# Patient Record
Sex: FEMALE | Race: WHITE | Employment: OTHER | URBAN - METROPOLITAN AREA
[De-identification: names, ages, dates, MRNs, and addresses within clinical notes are randomized per-mention and may not be internally consistent; named-entity substitution may affect disease eponyms.]

---

## 2017-01-04 ENCOUNTER — NURSE ONLY (OUTPATIENT)
Dept: CARDIOLOGY CLINIC | Age: 72
End: 2017-01-04

## 2017-01-04 DIAGNOSIS — Z45.09 ENCOUNTER FOR ELECTRONIC ANALYSIS OF REVEAL EVENT RECORDER: Primary | ICD-10-CM

## 2017-01-04 DIAGNOSIS — R55 SYNCOPE, UNSPECIFIED SYNCOPE TYPE: ICD-10-CM

## 2017-01-04 PROCEDURE — 93299 PR REM INTERROG ICPMS/SCRMS <30 D TECH REVIEW: CPT | Performed by: INTERNAL MEDICINE

## 2017-01-04 PROCEDURE — 93298 REM INTERROG DEV EVAL SCRMS: CPT | Performed by: INTERNAL MEDICINE

## 2017-01-23 ENCOUNTER — OFFICE VISIT (OUTPATIENT)
Dept: CARDIOLOGY CLINIC | Age: 72
End: 2017-01-23

## 2017-01-23 ENCOUNTER — PROCEDURE VISIT (OUTPATIENT)
Dept: CARDIOLOGY CLINIC | Age: 72
End: 2017-01-23

## 2017-01-23 VITALS
BODY MASS INDEX: 31.84 KG/M2 | OXYGEN SATURATION: 98 % | HEIGHT: 65 IN | WEIGHT: 191.12 LBS | DIASTOLIC BLOOD PRESSURE: 72 MMHG | HEART RATE: 100 BPM | SYSTOLIC BLOOD PRESSURE: 130 MMHG

## 2017-01-23 DIAGNOSIS — R55 SYNCOPE, UNSPECIFIED SYNCOPE TYPE: ICD-10-CM

## 2017-01-23 DIAGNOSIS — E78.5 HYPERLIPIDEMIA, UNSPECIFIED HYPERLIPIDEMIA TYPE: ICD-10-CM

## 2017-01-23 DIAGNOSIS — I10 ESSENTIAL HYPERTENSION: ICD-10-CM

## 2017-01-23 DIAGNOSIS — Z45.09 ENCOUNTER FOR ELECTRONIC ANALYSIS OF REVEAL EVENT RECORDER: ICD-10-CM

## 2017-01-23 DIAGNOSIS — I44.7 LEFT BUNDLE BRANCH BLOCK: Primary | ICD-10-CM

## 2017-01-23 PROCEDURE — 99214 OFFICE O/P EST MOD 30 MIN: CPT | Performed by: INTERNAL MEDICINE

## 2017-01-23 PROCEDURE — 93291 INTERROG DEV EVAL SCRMS IP: CPT | Performed by: INTERNAL MEDICINE

## 2017-02-03 PROCEDURE — 93298 REM INTERROG DEV EVAL SCRMS: CPT | Performed by: INTERNAL MEDICINE

## 2017-02-03 PROCEDURE — 93299 PR REM INTERROG ICPMS/SCRMS <30 D TECH REVIEW: CPT | Performed by: INTERNAL MEDICINE

## 2017-02-08 ENCOUNTER — NURSE ONLY (OUTPATIENT)
Dept: CARDIOLOGY CLINIC | Age: 72
End: 2017-02-08

## 2017-02-08 DIAGNOSIS — R55 SYNCOPE, UNSPECIFIED SYNCOPE TYPE: ICD-10-CM

## 2017-02-08 DIAGNOSIS — Z45.09 ENCOUNTER FOR ELECTRONIC ANALYSIS OF REVEAL EVENT RECORDER: Primary | ICD-10-CM

## 2017-04-05 ENCOUNTER — NURSE ONLY (OUTPATIENT)
Dept: CARDIOLOGY CLINIC | Age: 72
End: 2017-04-05

## 2017-04-05 DIAGNOSIS — Z45.09 ENCOUNTER FOR ELECTRONIC ANALYSIS OF REVEAL EVENT RECORDER: ICD-10-CM

## 2017-04-05 DIAGNOSIS — R55 SYNCOPE, UNSPECIFIED SYNCOPE TYPE: ICD-10-CM

## 2017-04-05 PROCEDURE — 93298 REM INTERROG DEV EVAL SCRMS: CPT | Performed by: INTERNAL MEDICINE

## 2017-04-05 PROCEDURE — 93299 PR REM INTERROG ICPMS/SCRMS <30 D TECH REVIEW: CPT | Performed by: INTERNAL MEDICINE

## 2017-04-06 ENCOUNTER — TELEPHONE (OUTPATIENT)
Dept: CARDIOLOGY CLINIC | Age: 72
End: 2017-04-06

## 2017-05-10 ENCOUNTER — OFFICE VISIT (OUTPATIENT)
Dept: ORTHOPEDIC SURGERY | Age: 72
End: 2017-05-10

## 2017-05-10 VITALS
HEART RATE: 72 BPM | BODY MASS INDEX: 31.65 KG/M2 | DIASTOLIC BLOOD PRESSURE: 72 MMHG | WEIGHT: 190 LBS | SYSTOLIC BLOOD PRESSURE: 139 MMHG | HEIGHT: 65 IN

## 2017-05-10 DIAGNOSIS — Z87.39 S/P DISCECTOMY FOR HERNIATED NUCLEUS PULPOSUS: ICD-10-CM

## 2017-05-10 DIAGNOSIS — M54.41 ACUTE BILATERAL LOW BACK PAIN WITH RIGHT-SIDED SCIATICA: ICD-10-CM

## 2017-05-10 DIAGNOSIS — Z98.890 S/P DISCECTOMY FOR HERNIATED NUCLEUS PULPOSUS: ICD-10-CM

## 2017-05-10 DIAGNOSIS — M54.16 LUMBAR RADICULOPATHY: Primary | ICD-10-CM

## 2017-05-10 PROCEDURE — 99214 OFFICE O/P EST MOD 30 MIN: CPT | Performed by: NEUROLOGICAL SURGERY

## 2017-05-19 ENCOUNTER — HOSPITAL ENCOUNTER (OUTPATIENT)
Dept: MAMMOGRAPHY | Age: 72
Discharge: HOME | End: 2017-05-19
Attending: SURGERY | Admitting: SURGERY

## 2017-05-19 DIAGNOSIS — Z12.31 VISIT FOR SCREENING MAMMOGRAM: ICD-10-CM

## 2017-05-26 ENCOUNTER — OFFICE VISIT (OUTPATIENT)
Dept: SURGERY | Age: 72
End: 2017-05-26

## 2017-05-26 VITALS
HEIGHT: 65 IN | BODY MASS INDEX: 31.49 KG/M2 | HEART RATE: 80 BPM | WEIGHT: 189 LBS | DIASTOLIC BLOOD PRESSURE: 64 MMHG | SYSTOLIC BLOOD PRESSURE: 130 MMHG

## 2017-05-26 DIAGNOSIS — Z08 ENCOUNTER FOR FOLLOW-UP SURVEILLANCE OF BREAST CANCER: ICD-10-CM

## 2017-05-26 DIAGNOSIS — Z85.3 ENCOUNTER FOR FOLLOW-UP SURVEILLANCE OF BREAST CANCER: ICD-10-CM

## 2017-05-26 DIAGNOSIS — Z90.10 HISTORY OF PARTIAL MASTECTOMY, UNSPECIFIED LATERALITY: ICD-10-CM

## 2017-05-26 DIAGNOSIS — Z12.31 VISIT FOR SCREENING MAMMOGRAM: ICD-10-CM

## 2017-05-26 DIAGNOSIS — Z85.3 PERSONAL HISTORY OF BREAST CANCER: Primary | Chronic | ICD-10-CM

## 2017-05-26 PROCEDURE — 99213 OFFICE O/P EST LOW 20 MIN: CPT | Performed by: SURGERY

## 2017-06-23 LAB
AVERAGE GLUCOSE: NORMAL
HBA1C MFR BLD: 6.7 %

## 2017-07-18 ENCOUNTER — PROCEDURE VISIT (OUTPATIENT)
Dept: CARDIOLOGY CLINIC | Age: 72
End: 2017-07-18

## 2017-07-18 ENCOUNTER — OFFICE VISIT (OUTPATIENT)
Dept: CARDIOLOGY CLINIC | Age: 72
End: 2017-07-18

## 2017-07-18 VITALS
DIASTOLIC BLOOD PRESSURE: 68 MMHG | SYSTOLIC BLOOD PRESSURE: 120 MMHG | WEIGHT: 189 LBS | HEIGHT: 65 IN | OXYGEN SATURATION: 98 % | HEART RATE: 86 BPM | BODY MASS INDEX: 31.49 KG/M2

## 2017-07-18 DIAGNOSIS — R55 SYNCOPE, UNSPECIFIED SYNCOPE TYPE: ICD-10-CM

## 2017-07-18 DIAGNOSIS — I44.7 LBBB (LEFT BUNDLE BRANCH BLOCK): Primary | ICD-10-CM

## 2017-07-18 DIAGNOSIS — Z45.09 ENCOUNTER FOR ELECTRONIC ANALYSIS OF REVEAL EVENT RECORDER: ICD-10-CM

## 2017-07-18 DIAGNOSIS — I10 ESSENTIAL HYPERTENSION: Chronic | ICD-10-CM

## 2017-07-18 DIAGNOSIS — E78.5 HYPERLIPIDEMIA, UNSPECIFIED HYPERLIPIDEMIA TYPE: Chronic | ICD-10-CM

## 2017-07-18 PROCEDURE — 99214 OFFICE O/P EST MOD 30 MIN: CPT | Performed by: INTERNAL MEDICINE

## 2017-07-18 PROCEDURE — 93291 INTERROG DEV EVAL SCRMS IP: CPT | Performed by: INTERNAL MEDICINE

## 2017-10-18 ENCOUNTER — NURSE ONLY (OUTPATIENT)
Dept: CARDIOLOGY CLINIC | Age: 72
End: 2017-10-18

## 2017-10-18 DIAGNOSIS — Z45.09 ENCOUNTER FOR ELECTRONIC ANALYSIS OF REVEAL EVENT RECORDER: ICD-10-CM

## 2017-10-18 DIAGNOSIS — R55 SYNCOPE, UNSPECIFIED SYNCOPE TYPE: ICD-10-CM

## 2017-10-18 PROCEDURE — 93299 PR REM INTERROG ICPMS/SCRMS <30 D TECH REVIEW: CPT | Performed by: INTERNAL MEDICINE

## 2017-10-18 PROCEDURE — 93298 REM INTERROG DEV EVAL SCRMS: CPT | Performed by: INTERNAL MEDICINE

## 2017-10-20 RX ORDER — SODIUM CHLORIDE 0.9 % (FLUSH) 0.9 %
10 SYRINGE (ML) INJECTION PRN
Status: CANCELLED | OUTPATIENT
Start: 2017-10-20

## 2017-10-20 RX ORDER — LIDOCAINE HYDROCHLORIDE 10 MG/ML
1 INJECTION, SOLUTION EPIDURAL; INFILTRATION; INTRACAUDAL; PERINEURAL
Status: CANCELLED | OUTPATIENT
Start: 2017-10-20 | End: 2017-10-20

## 2017-10-20 RX ORDER — SODIUM CHLORIDE 9 MG/ML
INJECTION, SOLUTION INTRAVENOUS CONTINUOUS
Status: CANCELLED | OUTPATIENT
Start: 2017-10-20

## 2017-10-20 RX ORDER — SODIUM CHLORIDE 0.9 % (FLUSH) 0.9 %
10 SYRINGE (ML) INJECTION EVERY 12 HOURS SCHEDULED
Status: CANCELLED | OUTPATIENT
Start: 2017-10-20

## 2017-10-20 RX ORDER — ONDANSETRON 2 MG/ML
4 INJECTION INTRAMUSCULAR; INTRAVENOUS PRN
Status: CANCELLED | OUTPATIENT
Start: 2017-10-20

## 2017-11-01 ENCOUNTER — HOSPITAL ENCOUNTER (OUTPATIENT)
Dept: ENDOSCOPY | Age: 72
Discharge: HOME | End: 2017-10-26
Attending: INTERNAL MEDICINE | Admitting: INTERNAL MEDICINE

## 2018-01-22 ENCOUNTER — PROCEDURE VISIT (OUTPATIENT)
Dept: CARDIOLOGY CLINIC | Age: 73
End: 2018-01-22

## 2018-01-22 ENCOUNTER — OFFICE VISIT (OUTPATIENT)
Dept: CARDIOLOGY CLINIC | Age: 73
End: 2018-01-22

## 2018-01-22 VITALS
HEART RATE: 74 BPM | OXYGEN SATURATION: 98 % | HEIGHT: 65 IN | DIASTOLIC BLOOD PRESSURE: 72 MMHG | WEIGHT: 188 LBS | BODY MASS INDEX: 31.32 KG/M2 | SYSTOLIC BLOOD PRESSURE: 126 MMHG

## 2018-01-22 DIAGNOSIS — R55 SYNCOPE, UNSPECIFIED SYNCOPE TYPE: ICD-10-CM

## 2018-01-22 DIAGNOSIS — I10 ESSENTIAL HYPERTENSION: Primary | Chronic | ICD-10-CM

## 2018-01-22 DIAGNOSIS — Z45.09 ENCOUNTER FOR ELECTRONIC ANALYSIS OF REVEAL EVENT RECORDER: ICD-10-CM

## 2018-01-22 DIAGNOSIS — I44.7 LBBB (LEFT BUNDLE BRANCH BLOCK): ICD-10-CM

## 2018-01-22 DIAGNOSIS — E78.2 MIXED HYPERLIPIDEMIA: Chronic | ICD-10-CM

## 2018-01-22 PROCEDURE — 99214 OFFICE O/P EST MOD 30 MIN: CPT | Performed by: INTERNAL MEDICINE

## 2018-01-22 PROCEDURE — 93291 INTERROG DEV EVAL SCRMS IP: CPT | Performed by: INTERNAL MEDICINE

## 2018-01-22 NOTE — PROGRESS NOTES
[facility]   Electrophysiology   Date: 1/22/2018     CC: Atrial fibrillation   HPI: I had the privilege of visiting Liz Higginbotham in the office; her PMH includes LBBB present on previous ECG, diabetes mellitus, hypertension, IBS, hyperlipidemia, breast cancer. In Nov. 2014, she presented to the ED with the complaints of syncopal episode. Patient stated that she was sitting at the kitchen table after eating dinner and felt \"swimming\" in her head. She was concerned her blood sugar was low so she walked over to the counter and checked her blood sugar which was 138. Patient still felt LH and leaned over the sink. She turned to go back to table, next thing she knew she woke up on the floor. She felt her body shaking, little brief nausea. Uncertain of how long she was unconscious. She was not incontinent. Denied chest pain, palpitation or shortness of breath. She called her son, who called 911. Initial /75, HR 87 bpm. Her head had some soreness to touch. Currently asymptomatic. Tele monitor showed sinus rhythm. She exercises at [employer] 3 times a week without difficulty. She had a previous syncopal episode 2011 and was evaluated by Dr. Ilia Godwin. At that time, patient stated that she was working out at [facility] when she felt shaky like she was hypoglycemic. She sat down, drank some apple juice, and got up to leave. She felt lightheaded and dizzy, sat down and had some more apple juice, and apparently lost consciousness. EMS was called, and they brought her to the emergency room. Blood sugar 180. She had mildly hypotensive when she came to ED and was in normal sinus rhythm. Cardiac cath showed normal coronaries. She was suppose to have event monitor but not done. She currently has a Reveal monitor in place. Interrogation today shows normal function, no arrhythmias. Interval history: Today, she denies syncope or near syncope since her Reveal loop recorder was implanted. She is [place].  Interrogation Office: [phone]

## 2018-01-29 ENCOUNTER — HOSPITAL ENCOUNTER (OUTPATIENT)
Dept: ENDOSCOPY | Age: 73
Discharge: HOME | End: 2018-01-29
Attending: INTERNAL MEDICINE | Admitting: INTERNAL MEDICINE

## 2018-01-29 LAB
GLUCOSE BLD-MCNC: 117 MG/DL (ref 70–99)
GLUCOSE BLD-MCNC: 140 MG/DL (ref 70–99)
PERFORMED ON: ABNORMAL
PERFORMED ON: ABNORMAL

## 2018-01-29 RX ORDER — SODIUM CHLORIDE 0.9 % (FLUSH) 0.9 %
10 SYRINGE (ML) INJECTION EVERY 12 HOURS SCHEDULED
Status: DISCONTINUED | OUTPATIENT
Start: 2018-01-29 | End: 2018-01-30 | Stop reason: HOSPADM

## 2018-01-29 RX ORDER — SODIUM CHLORIDE 9 MG/ML
INJECTION, SOLUTION INTRAVENOUS CONTINUOUS
Status: DISCONTINUED | OUTPATIENT
Start: 2018-01-29 | End: 2018-01-30 | Stop reason: HOSPADM

## 2018-01-29 RX ORDER — SODIUM CHLORIDE 0.9 % (FLUSH) 0.9 %
10 SYRINGE (ML) INJECTION PRN
Status: DISCONTINUED | OUTPATIENT
Start: 2018-01-29 | End: 2018-01-30 | Stop reason: HOSPADM

## 2018-01-29 ASSESSMENT — ENCOUNTER SYMPTOMS: SHORTNESS OF BREATH: 0

## 2018-01-29 NOTE — ANESTHESIA PRE-OP
(LOTRISONE) 1-0.05 % cream Apply topically 2 times daily. Please dispense 45 gm tube 1 Tube 2    melatonin 3 MG TABS tablet Take 3 mg by mouth daily      glucosamine-chondroitin 750-600 MG TABS tablet Take 2 tablets by mouth daily       Multiple Vitamins-Minerals (CENTRUM SILVER PO) Take by mouth daily      gabapentin (NEURONTIN) 100 MG capsule Take 100 mg by mouth nightly      loperamide (IMODIUM) 2 MG capsule Take 2 mg by mouth as needed for Diarrhea      lisinopril-hydrochlorothiazide (PRINZIDE;ZESTORETIC) 20-25 MG per tablet Take 2 tablets by mouth daily.  metformin (GLUCOPHAGE) 500 MG tablet Take 500 mg by mouth 3 times daily (with meals).  simvastatin (ZOCOR) 20 MG tablet Take 20 mg by mouth nightly.  insulin glargine (LANTUS) 100 UNIT/ML injection Inject 30 Units into the skin nightly       loratadine (CLARITIN) 10 MG tablet Take 10 mg by mouth daily. Current Facility-Administered Medications   Medication Dose Route Frequency Provider Last Rate Last Dose    0.9 % sodium chloride infusion   Intravenous Continuous Rosalinda Padgett MD        sodium chloride flush 0.9 % injection 10 mL  10 mL Intravenous 2 times per day Rosalinda Padgett MD        sodium chloride flush 0.9 % injection 10 mL  10 mL Intravenous PRN Rosalinda Padgett MD           Vital Signs  (Current) There were no vitals filed for this visit.   (for past 48 hrs)  No Data Recorded  (last three values)   BP Readings from Last 3 Encounters:   01/22/18 126/72   07/18/17 120/68   05/26/17 130/64       CBC  Lab Results   Component Value Date    WBC 6.9 04/15/2016    RBC 3.84 04/15/2016    RBC 4.00 07/28/2015    HGB 12.1 04/15/2016    HCT 37.0 04/15/2016    MCV 96.2 04/15/2016    RDW 13.9 04/15/2016     04/15/2016       CMP    Lab Results   Component Value Date     04/15/2016    K 3.9 04/15/2016     04/15/2016    CO2 29 04/15/2016    BUN 16 04/15/2016    CREATININE 0.6 04/15/2016    GFRAA >60 04/15/2016

## 2018-05-29 ENCOUNTER — HOSPITAL ENCOUNTER (OUTPATIENT)
Dept: MAMMOGRAPHY | Age: 73
Discharge: HOME | End: 2018-05-29
Attending: SURGERY | Admitting: SURGERY

## 2018-05-29 ENCOUNTER — OFFICE VISIT (OUTPATIENT)
Dept: SURGERY | Age: 73
End: 2018-05-29

## 2018-05-29 VITALS
WEIGHT: 190 LBS | DIASTOLIC BLOOD PRESSURE: 71 MMHG | SYSTOLIC BLOOD PRESSURE: 135 MMHG | OXYGEN SATURATION: 97 % | HEIGHT: 65 IN | BODY MASS INDEX: 31.65 KG/M2 | RESPIRATION RATE: 16 BRPM | HEART RATE: 79 BPM

## 2018-05-29 DIAGNOSIS — Z12.31 VISIT FOR SCREENING MAMMOGRAM: ICD-10-CM

## 2018-05-29 DIAGNOSIS — Z90.12 HISTORY OF PARTIAL MASTECTOMY, LEFT: ICD-10-CM

## 2018-05-29 DIAGNOSIS — Z92.3 HISTORY OF RADIATION THERAPY: ICD-10-CM

## 2018-05-29 DIAGNOSIS — Z80.3 FAMILY HISTORY OF BREAST CANCER: ICD-10-CM

## 2018-05-29 DIAGNOSIS — Z85.3 PERSONAL HISTORY OF BREAST CANCER: Primary | Chronic | ICD-10-CM

## 2018-05-29 PROCEDURE — 99212 OFFICE O/P EST SF 10 MIN: CPT | Performed by: SURGERY

## 2018-07-16 ENCOUNTER — OFFICE VISIT (OUTPATIENT)
Dept: CARDIOLOGY CLINIC | Age: 73
End: 2018-07-16

## 2018-07-16 ENCOUNTER — PROCEDURE VISIT (OUTPATIENT)
Dept: CARDIOLOGY CLINIC | Age: 73
End: 2018-07-16

## 2018-07-16 VITALS
SYSTOLIC BLOOD PRESSURE: 120 MMHG | HEART RATE: 90 BPM | HEIGHT: 65 IN | WEIGHT: 190 LBS | BODY MASS INDEX: 31.65 KG/M2 | DIASTOLIC BLOOD PRESSURE: 72 MMHG

## 2018-07-16 DIAGNOSIS — R55 SYNCOPE, UNSPECIFIED SYNCOPE TYPE: ICD-10-CM

## 2018-07-16 DIAGNOSIS — E78.2 MIXED HYPERLIPIDEMIA: Chronic | ICD-10-CM

## 2018-07-16 DIAGNOSIS — I44.7 LEFT BUNDLE BRANCH BLOCK: ICD-10-CM

## 2018-07-16 DIAGNOSIS — R55 SYNCOPE, UNSPECIFIED SYNCOPE TYPE: Primary | ICD-10-CM

## 2018-07-16 DIAGNOSIS — E13.8 DIABETES MELLITUS OF OTHER TYPE WITH COMPLICATION, UNSPECIFIED LONG TERM INSULIN USE STATUS: Chronic | ICD-10-CM

## 2018-07-16 DIAGNOSIS — Z45.09 ENCOUNTER FOR ELECTRONIC ANALYSIS OF REVEAL EVENT RECORDER: ICD-10-CM

## 2018-07-16 DIAGNOSIS — I10 ESSENTIAL HYPERTENSION: Chronic | ICD-10-CM

## 2018-07-16 PROCEDURE — 93291 INTERROG DEV EVAL SCRMS IP: CPT | Performed by: INTERNAL MEDICINE

## 2018-07-16 PROCEDURE — 99214 OFFICE O/P EST MOD 30 MIN: CPT | Performed by: INTERNAL MEDICINE

## 2018-07-16 NOTE — PROGRESS NOTES
[address]   Electrophysiology   Date: 7/16/2018     CC: Atrial fibrillation   HPI: I had the privilege of visiting Kishore Perrin in the office; her PMH includes LBBB present on previous ECG, diabetes mellitus, hypertension, IBS, hyperlipidemia, breast cancer. In Nov. 2014, she presented to the ED with the complaints of syncopal episode. Patient stated that she was sitting at the kitchen table after eating dinner and felt \"swimming\" in her head. She was concerned her blood sugar was low so she walked over to the counter and checked her blood sugar which was 138. Patient still felt LH and leaned over the sink. She turned to go back to table, next thing she knew she woke up on the floor. She felt her body shaking, little brief nausea. Uncertain of how long she was unconscious. She was not incontinent. Denied chest pain, palpitation or shortness of breath. She called her son, who called 911. Initial /75, HR 87 bpm. Her head had some soreness to touch. Currently asymptomatic. Tele monitor showed sinus rhythm. She exercises at [employer] 3 times a week without difficulty. She had a previous syncopal episode 2011 and was evaluated by Dr. Larry Sykes. At that time, patient stated that she was working out at [employer] when she felt shaky like she was hypoglycemic. She sat down, drank some apple juice, and got up to leave. She felt lightheaded and dizzy, sat down and had some more apple juice, and apparently lost consciousness. EMS was called, and they brought her to the emergency room. Blood sugar 180. She had mildly hypotensive when she came to ED and was in normal sinus rhythm. Cardiac cath showed normal coronaries. She was suppose to have event monitor but not done. She currently has a Reveal monitor in place. Interrogation today shows normal function, brief PAT on 3/27/18 for 9 second duration. Interval history:  Mrs. Katherine Al arrives to office today for interrogation and management of ILR.  Device interrogation shows no symptoms. She is very [place] from meniere's disease. Patient denies lightheadedness, dizziness, chest pain, palpitations, orthopnea, edema, presyncope or syncope.      Past Medical History:   Diagnosis Date    Arthritis     hands, spine    Cancer ([address]     LEFT BREAST    Cataract     Crohn disease ([address]     POSSIBLE DIAGNOSIS    Dental bridge present     lower permanent    Dental crowns present     Venear upper anterior    Diabetes mellitus ([address]     IDDM    Goiter     Hearing impairment     severe, secondary to Meniere's disease    History of pancreatitis 1976    Hypercholesteremia     Hypertension     IBS (irritable bowel syndrome)     LBBB (left bundle branch block)     Recurrent UTI     Spinal stenosis     Type II or unspecified type diabetes mellitus without mention of complication, not stated as uncontrolled         Past Surgical History:   Procedure Laterality Date    AXILLARY SURGERY Left     BREAST BIOPSY  10/5/12    BREAST LUMPECTOMY  11 6 12    with axillary nodedissection    CARDIAC SURGERY  2010    coronary angiogram-no blockage    CARPAL TUNNEL RELEASE      bilateral    COLONOSCOPY      EYE SURGERY Bilateral     Cataracts    HYSTERECTOMY      INSERTABLE CARDIAC MONITOR      LUMBAR SPINE SURGERY  04/14/2016    BILATERAL DECOMPRESSIVE LUMBAR LAMINECTOMY L4-L5, RIGHT       Allergies   Allergen Reactions    Amoxicillin Diarrhea    Amoxicillin-Pot Clavulanate Diarrhea    Nitrofuran Derivatives Other (See Comments)     Causes severe abdominal cramping    Bactrim Rash       Medication:  Current Outpatient Prescriptions   Medication Sig Dispense Refill    Menthol, Topical Analgesic, (BIOFREEZE EX) Apply topically      tamoxifen (NOLVADEX) 20 MG tablet TAKE 1 TABLET BY MOUTH EVERY DAY 90 tablet 3    balsalazide (COLAZAL) 750 MG capsule Take 2,250 mg by mouth 2 times daily (with meals) Indications: 3 pills in AM with Breakfast and 3 pills in PM with Dinner  Insulin Lispro, Human, (HUMALOG PEN SC) Inject 8 Units into the skin 3 times daily (with meals)      aspirin 81 MG tablet Take 81 mg by mouth daily      polycarbophil (FIBERCON) 625 MG tablet Take 625 mg by mouth 2 times daily      Omega-3 Fatty Acids (FISH OIL) 1200 MG CAPS Take 1 capsule by mouth daily      Cholecalciferol (VITAMIN D3) 2000 UNITS CAPS Take 1 capsule by mouth daily      vitamin E 1000 UNITS capsule Take 400 Units by mouth daily      clotrimazole-betamethasone (LOTRISONE) 1-0.05 % cream Apply topically 2 times daily. Please dispense 45 gm tube 1 Tube 2    melatonin 3 MG TABS tablet Take 3 mg by mouth daily      glucosamine-chondroitin 750-600 MG TABS tablet Take 2 tablets by mouth daily       Multiple Vitamins-Minerals (CENTRUM SILVER PO) Take by mouth daily      gabapentin (NEURONTIN) 100 MG capsule Take 100 mg by mouth nightly      loperamide (IMODIUM) 2 MG capsule Take 2 mg by mouth as needed for Diarrhea      lisinopril-hydrochlorothiazide (PRINZIDE;ZESTORETIC) 20-25 MG per tablet Take 2 tablets by mouth daily.  metformin (GLUCOPHAGE) 500 MG tablet Take 500 mg by mouth 3 times daily (with meals).  simvastatin (ZOCOR) 20 MG tablet Take 20 mg by mouth nightly.  insulin glargine (LANTUS) 100 UNIT/ML injection Inject 30 Units into the skin nightly       loratadine (CLARITIN) 10 MG tablet Take 10 mg by mouth daily. No current facility-administered medications for this visit. Social History:  Reviewed. reports that she quit smoking about 34 years ago. She has never used smokeless tobacco. She reports that she does not drink alcohol or use drugs. Family History:  Reviewed. family history includes Cancer in her father; Diabetes in an other family member; Heart Disease in her mother.      Review of System:  · General ROS: negative for - chills, fever   · Psychological ROS: negative for - anxiety or depression  · Ophthalmic ROS: negative for - eye

## 2018-07-16 NOTE — PATIENT INSTRUCTIONS
statements. ¨ Sudden problems with walking or balance. ¨ A sudden, severe headache that is different from past headaches.     · You have symptoms of a heart attack. These may include:  ¨ Chest pain or pressure, or a strange feeling in the chest.  ¨ Sweating. ¨ Shortness of breath. ¨ Nausea or vomiting. ¨ Pain, pressure, or a strange feeling in the back, neck, jaw, or upper belly or in one or both shoulders or arms. ¨ Lightheadedness or sudden weakness. ¨ A fast or irregular heartbeat. After you call 911, the  may tell you to chew 1 adult-strength or 2 to 4 low-dose aspirin. Wait for an ambulance. Do not try to drive yourself.    Watch closely for changes in your health, and be sure to contact your doctor if:    · Your lightheadedness gets worse or does not get better with home care. Where can you learn more? Go to https://[email or web address][email or web address] org and sign in to your [employer] account. Enter J923 in the [id number] box to learn more about \"Lightheadedness or Faintness: Care Instructions. \"     If you do not have an account, please click on the \"Sign Up Now\" link. Current as of: November 20, 2017  Content Version: 11.6  © 4044-3880 [employer]. Care instructions adapted under license by [facility] ([facility]). If you have questions about a medical condition or this instruction, always ask your healthcare professional. [place] [address] any warranty or liability for your use of this information.

## 2019-04-02 ENCOUNTER — OFFICE VISIT (OUTPATIENT)
Dept: CARDIOLOGY CLINIC | Age: 74
End: 2019-04-02
Payer: MEDICARE

## 2019-04-02 VITALS
HEART RATE: 89 BPM | RESPIRATION RATE: 14 BRPM | DIASTOLIC BLOOD PRESSURE: 75 MMHG | HEIGHT: 66 IN | SYSTOLIC BLOOD PRESSURE: 132 MMHG | BODY MASS INDEX: 30.53 KG/M2 | WEIGHT: 190 LBS

## 2019-04-02 DIAGNOSIS — I10 ESSENTIAL HYPERTENSION: ICD-10-CM

## 2019-04-02 DIAGNOSIS — I44.7 LBBB (LEFT BUNDLE BRANCH BLOCK): Primary | ICD-10-CM

## 2019-04-02 DIAGNOSIS — E78.5 HYPERLIPIDEMIA, UNSPECIFIED HYPERLIPIDEMIA TYPE: ICD-10-CM

## 2019-04-02 PROCEDURE — 93000 ELECTROCARDIOGRAM COMPLETE: CPT | Performed by: INTERNAL MEDICINE

## 2019-04-02 PROCEDURE — 99214 OFFICE O/P EST MOD 30 MIN: CPT | Performed by: INTERNAL MEDICINE

## 2019-04-02 NOTE — PROGRESS NOTES
[address]   Electrophysiology   Date: 4/2/2019     CC: Left bundle branch block   HPI: Frieda Reusing PMH LBBB, DM, hypertension, IBS, hyperlipidemia, breast cancer. In Nov. 2014, she presented to the ED with the complaints of syncopal episode. Patient stated that she was sitting at the kitchen table after eating dinner and felt \"swimming\" in her head. She was concerned her blood sugar was low so she walked over to the counter and checked her blood sugar which was 138. Patient still felt LH and leaned over the sink. She turned to go back to table, next thing she knew she woke up on the floor. She felt her body shaking, little brief nausea. Uncertain of how long she was unconscious. She was not incontinent. Denied chest pain, palpitation or shortness of breath. She called her son, who called 911. Initial /75, HR 87 bpm. Her head had some soreness to touch. Currently asymptomatic. Tele monitor showed sinus rhythm. She exercises at [employer] 3 times a week without difficulty. She had a previous syncopal episode 2011 and was evaluated by Dr. Aristides Haas. At that time, patient stated that she was working out at [facility] when she felt shaky like she was hypoglycemic. She sat down, drank some apple juice, and got up to leave. She felt lightheaded and dizzy, sat down and had some more apple juice, and apparently lost consciousness. EMS was called, and they brought her to the emergency room. Blood sugar 180. She had mildly hypotensive when she came to ED and was in normal sinus rhythm. Cardiac cath showed normal coronaries. She was suppose to have event monitor but not done. She currently has a Reveal monitor in place. Interval history: Today Juany Jaime is being seen for a routine follow up. She has had no more episodes of passing out since her last visit. She is feeling well from a cardiac standpoint but will be having a few procedures for the osteoarthritis in her back.        Past Medical History:   Diagnosis Date    Arthritis     hands, spine    Cancer [address] [address]     LEFT BREAST    Cataract     Crohn disease ([address]     POSSIBLE DIAGNOSIS    Dental bridge present     lower permanent    Dental crowns present     Venear upper anterior    Diabetes mellitus ([facility] [address]     IDDM    Goiter     Hearing impairment     severe, secondary to Meniere's disease    History of pancreatitis 1976    Hypercholesteremia     Hypertension     IBS (irritable bowel syndrome)     LBBB (left bundle branch block)     Recurrent UTI     Spinal stenosis     Type II or unspecified type diabetes mellitus without mention of complication, not stated as uncontrolled         Past Surgical History:   Procedure Laterality Date    AXILLARY SURGERY Left     BREAST BIOPSY  10/5/12    BREAST LUMPECTOMY  11 6 12    with axillary nodedissection    CARDIAC SURGERY  2010    coronary angiogram-no blockage    CARPAL TUNNEL RELEASE      bilateral    COLONOSCOPY      EYE SURGERY Bilateral     Cataracts    HYSTERECTOMY      INSERTABLE CARDIAC MONITOR      LUMBAR SPINE SURGERY  04/14/2016    BILATERAL DECOMPRESSIVE LUMBAR LAMINECTOMY L4-L5, RIGHT       Allergies   Allergen Reactions    Amoxicillin Diarrhea    Amoxicillin-Pot Clavulanate Diarrhea    Nitrofuran Derivatives Other (See Comments)     Causes severe abdominal cramping    Bactrim Rash       Medication:  Current Outpatient Medications   Medication Sig Dispense Refill    tamoxifen (NOLVADEX) 20 MG tablet TAKE 1 TABLET BY MOUTH EVERY DAY 90 tablet 3    balsalazide (COLAZAL) 750 MG capsule Take 2,250 mg by mouth 2 times daily (with meals) Indications: 3 pills in AM with Breakfast and 3 pills in PM with Dinner      Insulin Lispro, Human, (HUMALOG PEN SC) Inject 8 Units into the skin 3 times daily (with meals)      aspirin 81 MG tablet Take 81 mg by mouth daily      Cholecalciferol (VITAMIN D3) 2000 UNITS CAPS Take 1 capsule by mouth daily      clotrimazole-betamethasone (LOTRISONE) 1-0.05 % cream Apply topically 2 times daily. Please dispense 45 gm tube 1 Tube 2    melatonin 3 MG TABS tablet Take 3 mg by mouth daily      glucosamine-chondroitin 750-600 MG TABS tablet Take 2 tablets by mouth daily       Multiple Vitamins-Minerals (CENTRUM SILVER PO) Take by mouth daily      gabapentin (NEURONTIN) 100 MG capsule Take 100 mg by mouth nightly      loperamide (IMODIUM) 2 MG capsule Take 2 mg by mouth as needed for Diarrhea      lisinopril-hydrochlorothiazide (PRINZIDE;ZESTORETIC) 20-25 MG per tablet Take 2 tablets by mouth daily.  metformin (GLUCOPHAGE) 500 MG tablet Take 500 mg by mouth 3 times daily (with meals).  simvastatin (ZOCOR) 20 MG tablet Take 20 mg by mouth nightly.  insulin glargine (LANTUS) 100 UNIT/ML injection Inject 30 Units into the skin nightly       loratadine (CLARITIN) 10 MG tablet Take 10 mg by mouth daily.  Menthol, Topical Analgesic, (BIOFREEZE EX) Apply topically       No current facility-administered medications for this visit. Social History:  Reviewed. reports that she quit smoking about 34 years ago. She has never used smokeless tobacco. She reports that she does not drink alcohol or use drugs. Family History:  Reviewed. family history includes Cancer in her father; Diabetes in an other family member; Heart Disease in her mother. Review of System:  · General ROS: negative for - chills, fever   · Psychological ROS: negative for - anxiety or depression  · Ophthalmic ROS: negative for - eye pain or loss of vision  · ENT ROS: negative for - headaches, sore throat   · Allergy and Immunology ROS: negative for - hives  · Hematological and Lymphatic ROS: negative for - bleeding problems, blood clots, bruising or jaundice  · Endocrine ROS: negative for - skin changes, temperature intolerance or unexpected weight changes  · Respiratory ROS: negative for - cough, sputum, wheezing  · Cardiovascular ROS: Per HPI.    · Gastrointestinal ROS: discussed. 1 year f/u    NOTE: This report was transcribed using voice recognition software. Every effort was made to ensure accuracy, however, inadvertent computerized transcription errors may be present. Betty Woody MD, MPH  [place] [id number]   Office: [phone]     Scribe attestation: This note was scribed in the presence of Betty Woody MD by Manasa Gann RN  Physician Attestation: I, Dr. Betty Woody, confirm that the scribe's documentation has been prepared under my direction and personally reviewed by me in its entirety. I also confirm that the note above accurately reflects all work, treatment, procedures, and medical decision making performed by me.

## 2019-05-30 ENCOUNTER — HOSPITAL ENCOUNTER (OUTPATIENT)
Dept: WOMENS IMAGING | Age: 74
Discharge: HOME | End: 2019-05-30
Payer: MEDICARE

## 2019-05-30 DIAGNOSIS — Z85.3 PERSONAL HISTORY OF MALIGNANT NEOPLASM OF BREAST: ICD-10-CM

## 2019-05-30 PROCEDURE — G0279 TOMOSYNTHESIS, MAMMO: HCPCS

## 2019-05-31 ENCOUNTER — HOSPITAL ENCOUNTER (OUTPATIENT)
Dept: ULTRASOUND IMAGING | Age: 74
Discharge: HOME | End: 2019-05-31
Payer: MEDICARE

## 2019-05-31 DIAGNOSIS — N64.4 BREAST PAIN, LEFT: ICD-10-CM

## 2019-05-31 DIAGNOSIS — N64.4 BREAST PAIN, RIGHT: ICD-10-CM

## 2019-05-31 PROCEDURE — 76642 ULTRASOUND BREAST LIMITED: CPT

## 2019-10-01 ENCOUNTER — HOSPITAL ENCOUNTER (EMERGENCY)
Age: 74
Discharge: HOME | End: 2019-10-01
Payer: MEDICARE

## 2019-10-01 VITALS
HEART RATE: 73 BPM | RESPIRATION RATE: 14 BRPM | DIASTOLIC BLOOD PRESSURE: 70 MMHG | BODY MASS INDEX: 30.99 KG/M2 | SYSTOLIC BLOOD PRESSURE: 134 MMHG | HEIGHT: 65 IN | OXYGEN SATURATION: 96 % | TEMPERATURE: 98.5 F | WEIGHT: 186 LBS

## 2019-10-01 DIAGNOSIS — S91.002A WOUND OF LEFT ANKLE, INITIAL ENCOUNTER: Primary | ICD-10-CM

## 2019-10-01 LAB
BASOPHILS ABSOLUTE: 0 K/UL (ref 0–0.2)
BASOPHILS RELATIVE PERCENT: 0.4 %
EOSINOPHILS ABSOLUTE: 0 K/UL (ref 0–0.6)
EOSINOPHILS RELATIVE PERCENT: 0.5 %
HCT VFR BLD CALC: 38.4 % (ref 36–48)
HEMOGLOBIN: 12.7 G/DL (ref 12–16)
LYMPHOCYTES ABSOLUTE: 0.7 K/UL (ref 1–5.1)
LYMPHOCYTES RELATIVE PERCENT: 15.9 %
MCH RBC QN AUTO: 32.1 PG (ref 26–34)
MCHC RBC AUTO-ENTMCNC: 33.1 G/DL (ref 31–36)
MCV RBC AUTO: 96.8 FL (ref 80–100)
MONOCYTES ABSOLUTE: 0.3 K/UL (ref 0–1.3)
MONOCYTES RELATIVE PERCENT: 7.6 %
NEUTROPHILS ABSOLUTE: 3.4 K/UL (ref 1.7–7.7)
NEUTROPHILS RELATIVE PERCENT: 75.6 %
PDW BLD-RTO: 14.1 % (ref 12.4–15.4)
PLATELET # BLD: 173 K/UL (ref 135–450)
PMV BLD AUTO: 8.7 FL (ref 5–10.5)
RBC # BLD: 3.97 M/UL (ref 4–5.2)
WBC # BLD: 4.6 K/UL (ref 4–11)

## 2019-10-01 PROCEDURE — 85025 COMPLETE CBC W/AUTO DIFF WBC: CPT

## 2019-10-01 PROCEDURE — 99282 EMERGENCY DEPT VISIT SF MDM: CPT

## 2019-10-01 ASSESSMENT — ENCOUNTER SYMPTOMS
NAUSEA: 0
VOMITING: 0

## 2020-05-12 ENCOUNTER — HOSPITAL ENCOUNTER (OUTPATIENT)
Age: 75
Discharge: HOME | End: 2020-05-12
Payer: MEDICARE

## 2020-05-12 LAB
ALBUMIN SERPL-MCNC: 3.7 G/DL (ref 3.4–5)
ALP BLD-CCNC: 62 U/L (ref 40–129)
ALT SERPL-CCNC: 12 U/L (ref 10–40)
ANION GAP SERPL CALCULATED.3IONS-SCNC: 11 MMOL/L (ref 3–16)
AST SERPL-CCNC: 18 U/L (ref 15–37)
BASOPHILS ABSOLUTE: 0 K/UL (ref 0–0.2)
BASOPHILS RELATIVE PERCENT: 0.8 %
BILIRUB SERPL-MCNC: 0.4 MG/DL (ref 0–1)
BILIRUBIN DIRECT: <0.2 MG/DL (ref 0–0.3)
BILIRUBIN, INDIRECT: ABNORMAL MG/DL (ref 0–1)
BUN BLDV-MCNC: 19 MG/DL (ref 7–20)
CALCIUM SERPL-MCNC: 8.9 MG/DL (ref 8.3–10.6)
CHLORIDE BLD-SCNC: 103 MMOL/L (ref 99–110)
CO2: 27 MMOL/L (ref 21–32)
CREAT SERPL-MCNC: 0.7 MG/DL (ref 0.6–1.2)
EOSINOPHILS ABSOLUTE: 0.1 K/UL (ref 0–0.6)
EOSINOPHILS RELATIVE PERCENT: 2.7 %
GFR AFRICAN AMERICAN: >60
GFR NON-AFRICAN AMERICAN: >60
GLUCOSE BLD-MCNC: 187 MG/DL (ref 70–99)
HCT VFR BLD CALC: 39.2 % (ref 36–48)
HEMOGLOBIN: 13.1 G/DL (ref 12–16)
LYMPHOCYTES ABSOLUTE: 0.9 K/UL (ref 1–5.1)
LYMPHOCYTES RELATIVE PERCENT: 16.3 %
MCH RBC QN AUTO: 32.4 PG (ref 26–34)
MCHC RBC AUTO-ENTMCNC: 33.3 G/DL (ref 31–36)
MCV RBC AUTO: 97 FL (ref 80–100)
MONOCYTES ABSOLUTE: 0.4 K/UL (ref 0–1.3)
MONOCYTES RELATIVE PERCENT: 7.2 %
NEUTROPHILS ABSOLUTE: 3.9 K/UL (ref 1.7–7.7)
NEUTROPHILS RELATIVE PERCENT: 73 %
PDW BLD-RTO: 14.6 % (ref 12.4–15.4)
PHOSPHORUS: 2.8 MG/DL (ref 2.5–4.9)
PLATELET # BLD: 218 K/UL (ref 135–450)
PMV BLD AUTO: 9.7 FL (ref 5–10.5)
POTASSIUM SERPL-SCNC: 3.8 MMOL/L (ref 3.5–5.1)
RBC # BLD: 4.04 M/UL (ref 4–5.2)
SODIUM BLD-SCNC: 141 MMOL/L (ref 136–145)
T4 FREE: 1.2 NG/DL (ref 0.9–1.8)
TOTAL PROTEIN: 6.2 G/DL (ref 6.4–8.2)
TSH SERPL DL<=0.05 MIU/L-ACNC: 2.06 UIU/ML (ref 0.27–4.2)
WBC # BLD: 5.3 K/UL (ref 4–11)

## 2020-05-12 PROCEDURE — 80069 RENAL FUNCTION PANEL: CPT

## 2020-05-12 PROCEDURE — 84443 ASSAY THYROID STIM HORMONE: CPT

## 2020-05-12 PROCEDURE — 36415 COLL VENOUS BLD VENIPUNCTURE: CPT

## 2020-05-12 PROCEDURE — 85025 COMPLETE CBC W/AUTO DIFF WBC: CPT

## 2020-05-12 PROCEDURE — 84439 ASSAY OF FREE THYROXINE: CPT

## 2020-05-12 PROCEDURE — 80076 HEPATIC FUNCTION PANEL: CPT

## 2020-07-06 ENCOUNTER — OFFICE VISIT (OUTPATIENT)
Dept: CARDIOLOGY CLINIC | Age: 75
End: 2020-07-06
Payer: MEDICARE

## 2020-07-06 VITALS
WEIGHT: 189 LBS | DIASTOLIC BLOOD PRESSURE: 74 MMHG | HEIGHT: 65 IN | SYSTOLIC BLOOD PRESSURE: 132 MMHG | BODY MASS INDEX: 31.49 KG/M2 | RESPIRATION RATE: 18 BRPM | HEART RATE: 89 BPM

## 2020-07-06 PROCEDURE — 99214 OFFICE O/P EST MOD 30 MIN: CPT | Performed by: INTERNAL MEDICINE

## 2020-07-06 PROCEDURE — 93000 ELECTROCARDIOGRAM COMPLETE: CPT | Performed by: INTERNAL MEDICINE

## 2020-07-06 RX ORDER — FUROSEMIDE 20 MG/1
20 TABLET ORAL DAILY PRN
Qty: 30 TABLET | Refills: 0 | Status: SHIPPED | OUTPATIENT
Start: 2020-07-06 | End: 2022-09-07

## 2020-07-06 NOTE — PROGRESS NOTES
[facility]   Electrophysiology   Date: 7/6/2020     CC: Left bundle branch block, history of syncope    HPI: Aleida Bradford [facility] LBBB, DM, hypertension, IBS, hyperlipidemia, breast cancer. In Nov. 2014, she presented to the ED with the complaints of syncopal episode. Patient stated that she was sitting at the kitchen table after eating dinner and felt \"swimming\" in her head. She was concerned her blood sugar was low so she walked over to the counter and checked her blood sugar which was 138. Patient still felt LH and leaned over the sink. She turned to go back to table, next thing she knew she woke up on the floor. She felt her body shaking, little brief nausea. Uncertain of how long she was unconscious. She was not incontinent. Denied chest pain, palpitation or shortness of breath. She called her son, who called 911. Initial /75, HR 87 bpm. Her head had some soreness to touch. Currently asymptomatic. Tele monitor showed sinus rhythm. She exercises at [employer] 3 times a week without difficulty. She had a previous syncopal episode 2011 and was evaluated by Dr. Niya Sosa. At that time, patient stated that she was working out at [facility] when she felt shaky like she was hypoglycemic. She sat down, drank some apple juice, and got up to leave. She felt lightheaded and dizzy, sat down and had some more apple juice, and apparently lost consciousness. EMS was called, and they brought her to the emergency room. Blood sugar 180. She had mildly hypotensive when she came to ED and was in normal sinus rhythm. Cardiac cath showed normal coronaries. She was suppose to have event monitor but not done. She currently has a Reveal monitor in place. S/p ILR which did not show any arrhythmia she did not have any syncope. Her loop recorder removed in 8/24/2019. Interval history:  Latoya España presents to the office in follow up. She has complaints of swelling in her lower extremity.  She walks 30-40 minutes daily. Barrett Saab states she does not believe the swelling is d/t activity. She tries to eliminate salt and does not even own a salt shaker. She had swelling and a scab on her left lower ankle that burst open and she had bleeding. She attests to having multiple back surgeries but denies having any broken ankles. Patient also has history of Ménière disease which is caused her hearing loss and dizziness and lightheadedness.     Past Medical History:   Diagnosis Date    Arthritis     hands, spine    Cancer ([address]     LEFT BREAST    Cataract     Crohn disease ([address]     POSSIBLE DIAGNOSIS    Dental bridge present     lower permanent    Dental crowns present     Venear upper anterior    Diabetes mellitus ([address]     IDDM    Goiter     Hearing impairment     severe, secondary to Meniere's disease    History of pancreatitis 1976    Hypercholesteremia     Hypertension     IBS (irritable bowel syndrome)     LBBB (left bundle branch block)     Recurrent UTI     Spinal stenosis     Type II or unspecified type diabetes mellitus without mention of complication, not stated as uncontrolled         Past Surgical History:   Procedure Laterality Date    AXILLARY SURGERY Left     BREAST BIOPSY  10/5/12    BREAST LUMPECTOMY  11 6 12    with axillary nodedissection    CARDIAC SURGERY  2010    coronary angiogram-no blockage    CARPAL TUNNEL RELEASE      bilateral    COLONOSCOPY      EYE SURGERY Bilateral     Cataracts    HYSTERECTOMY      INSERTABLE CARDIAC MONITOR      LUMBAR SPINE SURGERY  04/14/2016    BILATERAL DECOMPRESSIVE LUMBAR LAMINECTOMY L4-L5, RIGHT       Allergies   Allergen Reactions    Amoxicillin Diarrhea    Amoxicillin-Pot Clavulanate Diarrhea    Nitrofuran Derivatives Other (See Comments)     Causes severe abdominal cramping    Bactrim Rash       Medication:  Current Outpatient Medications   Medication Sig Dispense Refill    metroNIDAZOLE (METROCREAM) 0.75 % cream Apply bid      insulin lispro (HUMALOG KWIKPEN) 100 UNIT/ML pen Inject 8-12 Units into the skin 3 times daily (before meals)      Menthol, Topical Analgesic, (BIOFREEZE EX) Apply topically      tamoxifen (NOLVADEX) 20 MG tablet TAKE 1 TABLET BY MOUTH EVERY DAY 90 tablet 3    balsalazide (COLAZAL) 750 MG capsule Take 2,250 mg by mouth 2 times daily (with meals) Indications: 3 pills in AM with Breakfast and 3 pills in PM with Dinner      aspirin 81 MG tablet Take 81 mg by mouth daily      Cholecalciferol (VITAMIN D3) 2000 UNITS CAPS Take 1 capsule by mouth daily      clotrimazole-betamethasone (LOTRISONE) 1-0.05 % cream Apply topically 2 times daily. Please dispense 45 gm tube 1 Tube 2    melatonin 3 MG TABS tablet Take 3 mg by mouth daily      glucosamine-chondroitin 750-600 MG TABS tablet Take 2 tablets by mouth daily       Multiple Vitamins-Minerals (CENTRUM SILVER PO) Take by mouth daily      gabapentin (NEURONTIN) 100 MG capsule Take 100 mg by mouth 3 times daily.  loperamide (IMODIUM) 2 MG capsule Take 2 mg by mouth as needed for Diarrhea      lisinopril-hydrochlorothiazide (PRINZIDE;ZESTORETIC) 20-25 MG per tablet Take 2 tablets by mouth daily.  metformin (GLUCOPHAGE) 500 MG tablet Take 500 mg by mouth 3 times daily (with meals).  simvastatin (ZOCOR) 20 MG tablet Take 20 mg by mouth nightly.  insulin glargine (LANTUS) 100 UNIT/ML injection Inject 24-26 Units into the skin nightly       loratadine (CLARITIN) 10 MG tablet Take 10 mg by mouth daily. No current facility-administered medications for this visit. Social History:  Reviewed. reports that she quit smoking about 36 years ago. She has never used smokeless tobacco. She reports that she does not drink alcohol or use drugs. Family History:  Reviewed. family history includes Cancer in her father; Diabetes in an other family member; Heart Disease in her mother.      Review of System:  All other systems reviewed except for that noted present.   -There is mild-moderate tricuspid regurgitation with RVSP estimated at 35   mmHg. Assessment:   Patient Active Problem List    Diagnosis Date Noted    Herniated lumbar intervertebral disc 04/14/2016     Priority: High    Personal history of breast cancer 09/05/2013     Priority: Medium    Acute bilateral low back pain with right-sided sciatica 05/10/2017    S/P discectomy for herniated nucleus pulposus 07/11/2016    S/P lumbar laminectomy 05/12/2016    Lumbar stenosis 03/23/2016    Lumbar radiculopathy 03/23/2016    Right-sided low back pain with right-sided sciatica 03/23/2016    Ductal carcinoma in situ of left breast 01/26/2016    Encounter for monitoring tamoxifen therapy 01/26/2016    LBBB (left bundle branch block) 12/08/2015    Hearing loss 01/28/2015    Non-specific colitis 01/28/2015    Left bundle branch block 01/28/2015    Auditory vertigo 01/28/2015    Spinal stenosis 01/28/2015    Rosacea 01/28/2015    Encounter for loop recorder at end of battery life 11/17/2014    Osteoarthritis of foot 03/21/2014    Tibialis posterior tendinitis 03/25/2013    Syncope 11/28/2011    HTN (hypertension) 11/28/2011    Diabetes mellitus ([address] 11/28/2011    Hyperlipidemia 11/28/2011    Pure hypercholesterolemia 11/09/2001    Essential hypertension 11/09/2001        Plan:  Lower extremity swelling   -Denies weight gain    -No shortness of breath   -Lasix 20 mg as needed for swelling   -Encouraged daily weights   -Denies blacking out or passing out    Dietary restriction and avoiding salt discussed. History of syncope (two episodes) with LBBB at baseline in the past    -ECG today shows known LBBB with rate 78 BPM   -Hasn't had any syncope for a long time. -ILR with no arrhythmia. -ILR removed 8/24/18 after battery depletion. -EPS negative for VT and HPS disease.    -Continue clinical monitoring.       Patient has history of Ménière disease and has dizziness which makes her prone to fall. HTN  Vitals:    07/06/20 1301   BP: 132/74   Pulse: 89   Resp: 18      -Home BP monitoring encourage with a BP goal <130/80\    HLD  Simvastatin. 20 mg      Obesity  Body mass index is 31.45 kg/m². - Excessive weight is complicating assessment and treatment. It is placing patient at risk for multiple co-morbidities as well as early death and contributing to the patient's presentation. - discussed weight management with diet and exercise     - The patient is counseled to follow a low salt diet to assure blood pressure remains controlled for cardiovascular risk factor modification.   - The patient is counseled to avoid excess caffeine, and energy drinks as this may exacerbated ectopy and arrhythmia. - The patient is counseled to get regular exercise 3-5 times per week to control cardiovascular risk factors. - The patient is counseled to lose weigt to control cardiovascular risk factors. 1 year NP    NOTE: This report was transcribed using voice recognition software. Every effort was made to ensure accuracy, however, inadvertent computerized transcription errors may be present. Evon Messina MD, MPH  [place] [address]   Office: [phone]     Scribe attestation: This note was scribed in the presence of Evon Messina MD by Meaghan Tracy RN  Physician Attestation: I, Dr. Evon Messina, confirm that the scribe's documentation has been prepared under my direction and personally reviewed by me in its entirety. I also confirm that the note above accurately reflects all work, treatment, procedures, and medical decision making performed by me.

## 2020-07-21 ENCOUNTER — HOSPITAL ENCOUNTER (OUTPATIENT)
Dept: WOMENS IMAGING | Age: 75
Discharge: HOME | End: 2020-07-21
Payer: MEDICARE

## 2020-07-21 PROCEDURE — 77067 SCR MAMMO BI INCL CAD: CPT

## 2021-06-30 NOTE — PROGRESS NOTES
[facility]   Cardiac Consultation    Referring Provider:  Joellen Brady MD     Chief Complaint   Patient presents with    1 Year Follow Up     hx of syncope, asymptomatic       HPI:  Fern Cabral is a 64 y.o. female [facility] LBBB, DM, hypertension, IBS, hyperlipidemia, breast cancer.       In Nov. 2014, she presented to the ED with the complaints of syncopal episode. Patient stated that she was sitting at the kitchen table after eating dinner and felt \"swimming\" in her head. She was concerned her blood sugar was low so she walked over to the counter and checked her blood sugar which was 138. Patient still felt LH and leaned over the sink. She turned to go back to table, next thing she knew she woke up on the floor. She felt her body shaking, little brief nausea. Uncertain of how long she was unconscious. She was not incontinent. Denied chest pain, palpitation or shortness of breath. She called her son, who called 911. Initial /75, HR 87 bpm. Her head had some soreness to touch. Currently asymptomatic. Tele monitor showed sinus rhythm. She exercises at [employer] 3 times a week without difficulty.     She had a previous syncopal episode 2011 and was evaluated by Dr. Surjit Dailey. At that time, patient stated that she was working out at [employer] when she felt shaky like she was hypoglycemic. She sat down, drank some apple juice, and got up to leave. She felt lightheaded and dizzy, sat down and had some more apple juice, and apparently lost consciousness. EMS was called, and they brought her to the emergency room. Blood sugar 180. She had mildly hypotensive when she came to ED and was in normal sinus rhythm.      Cardiac cath showed normal coronaries. She was supposed to have event monitor but not done. She had a Reveal monitor in place.     S/p ILR which did not show any arrhythmia she did not have any syncope during that time.     Her loop recorder removed in 8/24/2019.     Joyce Cordero presents today in follow up. She has had no more syncopal episodes since 2014. She states she is dizzy all the time because of Menieres disease. She uses a cane to ambulate due to her dizziness. She is very hard of hearing. Past Medical History:   has a past medical history of Arthritis, Cancer ([address] Cataract, Crohn disease ([address] Dental bridge present, Dental crowns present, Diabetes mellitus ([address] Goiter, Hearing impairment, History of pancreatitis, Hypercholesteremia, Hypertension, IBS (irritable bowel syndrome), LBBB (left bundle branch block), Recurrent UTI, Spinal stenosis, and Type II or unspecified type diabetes mellitus without mention of complication, not stated as uncontrolled. Surgical History:   has a past surgical history that includes Carpal tunnel release; Hysterectomy; Insertable Cardiac Monitor; eye surgery (Bilateral); Breast lumpectomy (11 6 12); Breast biopsy (10/5/12); Colonoscopy; Cardiac surgery (2010); Axillary Surgery (Left); and Lumbar spine surgery (04/14/2016). Social History:   reports that she quit smoking about 37 years ago. She smoked 0.00 packs per day for 0.00 years. She has never used smokeless tobacco. She reports that she does not drink alcohol and does not use drugs. Family History:  family history includes Cancer in her father; Diabetes in an other family member; Heart Disease in her mother.      Home Medications:  Outpatient Encounter Medications as of 7/1/2021   Medication Sig Dispense Refill    metroNIDAZOLE (METROCREAM) 0.75 % cream Apply bid      furosemide (LASIX) 20 MG tablet Take 1 tablet by mouth daily as needed (for swelling) 30 tablet 0    Menthol, Topical Analgesic, (BIOFREEZE EX) Apply topically      tamoxifen (NOLVADEX) 20 MG tablet TAKE 1 TABLET BY MOUTH EVERY DAY 90 tablet 3    balsalazide (COLAZAL) 750 MG capsule Take 2,250 mg by mouth 2 times daily (with meals) Indications: 3 pills in AM with Breakfast and 3 pills in PM with Dinner      aspirin 81 MG tablet Take 81 mg by mouth daily      Cholecalciferol (VITAMIN D3) 2000 UNITS CAPS Take 1 capsule by mouth daily      clotrimazole-betamethasone (LOTRISONE) 1-0.05 % cream Apply topically 2 times daily. Please dispense 45 gm tube 1 Tube 2    melatonin 3 MG TABS tablet Take 3 mg by mouth daily      glucosamine-chondroitin 750-600 MG TABS tablet Take 2 tablets by mouth daily       Multiple Vitamins-Minerals (CENTRUM SILVER PO) Take by mouth daily      gabapentin (NEURONTIN) 100 MG capsule Take 100 mg by mouth 3 times daily.  loperamide (IMODIUM) 2 MG capsule Take 2 mg by mouth as needed for Diarrhea      lisinopril-hydrochlorothiazide (PRINZIDE;ZESTORETIC) 20-25 MG per tablet Take 2 tablets by mouth daily.  metformin (GLUCOPHAGE) 500 MG tablet Take 500 mg by mouth 3 times daily (with meals).  simvastatin (ZOCOR) 20 MG tablet Take 20 mg by mouth nightly.  insulin glargine (LANTUS) 100 UNIT/ML injection Inject 24-26 Units into the skin nightly       loratadine (CLARITIN) 10 MG tablet Take 10 mg by mouth daily.  [DISCONTINUED] insulin lispro (HUMALOG KWIKPEN) 100 UNIT/ML pen Inject 8-12 Units into the skin 3 times daily (before meals) (Patient not taking: Reported on 7/1/2021)       No facility-administered encounter medications on file as of 7/1/2021. Allergies:  Amoxicillin, Amoxicillin-pot clavulanate, Nitrofuran derivatives, and Bactrim     Review of Systems   Constitutional: Negative for activity change and appetite change. HENT: Negative for facial swelling and neck pain. vertigo, Hearing loss  Eyes: Negative for discharge and itching. Respiratory: Negative for chest tightness and shortness of breath. Cardiovascular: Negative for palpitations. Negative for chest pain. Negative for leg swelling. Gastrointestinal: Negative for abdominal pain and abdominal distention. Genitourinary: Negative. Musculoskeletal: Negative.     Skin: Negative for color change and pallor. Neurological: Negative for dizziness, syncope and light-headedness. Hematological: Negative. Psychiatric/Behavioral: Negative for behavioral problems and agitation. BP (!) 142/78   Pulse 89   Ht 5' 5\" (1.651 m)   Wt 175 lb 12.8 oz (79.7 kg)   SpO2 98%   BMI 29.25 kg/m²         Objective:  Physical Exam   Nursing note and vitals reviewed. Constitutional: She appears well-developed and well-nourished. heavy  Head:  atraumatic. Eyes: Right eye exhibits no discharge. Left eye exhibits no discharge. Neck: Neck supple. Cardiovascular: Normal rate, regular rhythm and normal heart sounds. Pulmonary/Chest: Effort normal and breath sounds normal.   Abdominal: Soft. Musculoskeletal: She exhibits no edema. Neurological: She is alert without any gross abnormalities. Skin: Skin is warm and dry. Psychiatric: She has a normal mood and affect. Echo 11/3/14   Summary   -Normal left ventricle size, wall thickness and systolic function with an   estimated ejection fraction of 55%.   -Mild mitral regurgitation is present.   -Mild aortic regurgitation is present.   -There is mild-moderate tricuspid regurgitation with RVSP estimated at 35   mmHg. Assessment:    History of syncope (two episodes) with LBBB at baseline in the past               -ECG today sinus rhythm with LBBB        -Hasn't had any syncope for a long time, 2014               -ILR with no arrhythmia removed 08/24/2018 after battery depletion                      -EPS  11/7/2014 ( [facility]) negative for VT and HPS disease.               -Continue clinical monitoring.                  Patient has history of Ménière disease and has dizziness which makes her prone to fall. -HTN   not at goal   BP goal <130/80  - prinzide 20-25- 2 tablets by mouth daily         HLD   -Simvastatin  20 mg Daily    - Lipids 05/20/2020  , HDL 53, LDL 54, TG 93                Treated by PCP    -Obesity  Body mass index is 29.25 kg/m².   Excessive weight is complicating assessment and treatment. It is placing patient at risk for multiple co-morbidities as well as early death and contributing to the patient's presentation. Discussed weight management with diet and exercise        She has done well for years treated by PCP. She wishes to return once a year to discuss syncope and review her cardiac sx.     1. Essential hypertension    2. Mixed hyperlipidemia    3. LBBB (left bundle branch block)    4. Pure hypercholesterolemia    5. Syncope, unspecified syncope type          Plan:  Follow up in one year with EPNP     Azam Mota M.D. Scribe attestation: This note was scribed in the presence of Azam Mota MD.  by Mohit Blanchard RN  The scribes documentation has been prepared under my direction and personally reviewed by me in its entirety. I confirm that the note above accurately reflects all work, treatment, procedures, and medical decision making performed by me.

## 2021-07-01 ENCOUNTER — OFFICE VISIT (OUTPATIENT)
Dept: CARDIOLOGY CLINIC | Age: 76
End: 2021-07-01
Payer: MEDICARE

## 2021-07-01 VITALS
DIASTOLIC BLOOD PRESSURE: 78 MMHG | BODY MASS INDEX: 29.29 KG/M2 | OXYGEN SATURATION: 98 % | WEIGHT: 175.8 LBS | SYSTOLIC BLOOD PRESSURE: 142 MMHG | HEIGHT: 65 IN | HEART RATE: 89 BPM

## 2021-07-01 DIAGNOSIS — E78.2 MIXED HYPERLIPIDEMIA: Chronic | ICD-10-CM

## 2021-07-01 DIAGNOSIS — I44.7 LBBB (LEFT BUNDLE BRANCH BLOCK): ICD-10-CM

## 2021-07-01 DIAGNOSIS — I10 ESSENTIAL HYPERTENSION: Primary | ICD-10-CM

## 2021-07-01 DIAGNOSIS — E78.00 PURE HYPERCHOLESTEROLEMIA: ICD-10-CM

## 2021-07-01 DIAGNOSIS — R55 SYNCOPE, UNSPECIFIED SYNCOPE TYPE: ICD-10-CM

## 2021-07-01 PROCEDURE — 99213 OFFICE O/P EST LOW 20 MIN: CPT | Performed by: INTERNAL MEDICINE

## 2021-07-01 PROCEDURE — 93000 ELECTROCARDIOGRAM COMPLETE: CPT | Performed by: INTERNAL MEDICINE

## 2021-07-22 ENCOUNTER — HOSPITAL ENCOUNTER (OUTPATIENT)
Dept: WOMENS IMAGING | Age: 76
Discharge: HOME | End: 2021-07-22
Payer: MEDICARE

## 2021-07-22 VITALS — WEIGHT: 178 LBS | BODY MASS INDEX: 30.39 KG/M2 | HEIGHT: 64 IN

## 2021-07-22 DIAGNOSIS — Z12.31 ENCOUNTER FOR SCREENING MAMMOGRAM FOR MALIGNANT NEOPLASM OF BREAST: ICD-10-CM

## 2021-07-22 PROCEDURE — 77063 BREAST TOMOSYNTHESIS BI: CPT

## 2022-08-24 ENCOUNTER — HOSPITAL ENCOUNTER (OUTPATIENT)
Dept: WOMENS IMAGING | Age: 77
Discharge: HOME | End: 2022-08-24
Payer: MEDICARE

## 2022-08-24 VITALS — WEIGHT: 160 LBS | HEIGHT: 66 IN | BODY MASS INDEX: 25.71 KG/M2

## 2022-08-24 DIAGNOSIS — D05.12 DUCTAL CARCINOMA IN SITU OF LEFT BREAST: ICD-10-CM

## 2022-08-24 DIAGNOSIS — Z12.31 VISIT FOR SCREENING MAMMOGRAM: ICD-10-CM

## 2022-08-24 DIAGNOSIS — Z12.31 SCREENING MAMMOGRAM FOR BREAST CANCER: ICD-10-CM

## 2022-08-24 PROCEDURE — 77063 BREAST TOMOSYNTHESIS BI: CPT

## 2022-08-30 ENCOUNTER — TELEPHONE (OUTPATIENT)
Dept: FAMILY MEDICINE CLINIC | Age: 77
End: 2022-08-30

## 2022-08-30 NOTE — TELEPHONE ENCOUNTER
Pt son called trying to get Deedee scheduled with Ildefonso Lee approved new patient after labor day but I cannot find a time slot to put new pt that isn't a month away      Please advise and contact son Isacia Aniyah at [phone] to schedule Deedee

## 2022-09-07 ENCOUNTER — OFFICE VISIT (OUTPATIENT)
Dept: FAMILY MEDICINE CLINIC | Age: 77
End: 2022-09-07
Payer: MEDICARE

## 2022-09-07 VITALS
OXYGEN SATURATION: 99 % | BODY MASS INDEX: 25.97 KG/M2 | WEIGHT: 161.6 LBS | SYSTOLIC BLOOD PRESSURE: 132 MMHG | HEIGHT: 66 IN | DIASTOLIC BLOOD PRESSURE: 64 MMHG | HEART RATE: 68 BPM

## 2022-09-07 DIAGNOSIS — E13.9 DIABETES MELLITUS OF OTHER TYPE WITHOUT COMPLICATION, UNSPECIFIED WHETHER LONG TERM INSULIN USE (HCC): Primary | Chronic | ICD-10-CM

## 2022-09-07 DIAGNOSIS — Z85.3 PERSONAL HISTORY OF BREAST CANCER: Chronic | ICD-10-CM

## 2022-09-07 DIAGNOSIS — I10 ESSENTIAL HYPERTENSION: ICD-10-CM

## 2022-09-07 DIAGNOSIS — F02.80 ALZHEIMER DISEASE (HCC): ICD-10-CM

## 2022-09-07 DIAGNOSIS — E78.00 PURE HYPERCHOLESTEROLEMIA: ICD-10-CM

## 2022-09-07 DIAGNOSIS — H81.09 MENIERE'S DISEASE, UNSPECIFIED LATERALITY: ICD-10-CM

## 2022-09-07 DIAGNOSIS — G30.9 ALZHEIMER DISEASE (HCC): ICD-10-CM

## 2022-09-07 DIAGNOSIS — H91.93 BILATERAL HEARING LOSS, UNSPECIFIED HEARING LOSS TYPE: ICD-10-CM

## 2022-09-07 PROBLEM — M54.41 ACUTE BILATERAL LOW BACK PAIN WITH RIGHT-SIDED SCIATICA: Status: RESOLVED | Noted: 2017-05-10 | Resolved: 2022-09-07

## 2022-09-07 PROCEDURE — 1123F ACP DISCUSS/DSCN MKR DOCD: CPT | Performed by: NURSE PRACTITIONER

## 2022-09-07 PROCEDURE — 3044F HG A1C LEVEL LT 7.0%: CPT | Performed by: NURSE PRACTITIONER

## 2022-09-07 PROCEDURE — 99204 OFFICE O/P NEW MOD 45 MIN: CPT | Performed by: NURSE PRACTITIONER

## 2022-09-07 RX ORDER — DONEPEZIL HYDROCHLORIDE 5 MG/1
5 TABLET, FILM COATED ORAL NIGHTLY
Qty: 90 TABLET | Refills: 3 | Status: SHIPPED | OUTPATIENT
Start: 2022-09-07 | End: 2023-09-07

## 2022-09-07 RX ORDER — FLASH GLUCOSE SENSOR
KIT MISCELLANEOUS
COMMUNITY
Start: 2022-06-26

## 2022-09-07 RX ORDER — AMLODIPINE BESYLATE 5 MG/1
TABLET ORAL
COMMUNITY
Start: 2022-08-02

## 2022-09-07 SDOH — ECONOMIC STABILITY: FOOD INSECURITY: WITHIN THE PAST 12 MONTHS, THE FOOD YOU BOUGHT JUST DIDN'T LAST AND YOU DIDN'T HAVE MONEY TO GET MORE.: NEVER TRUE

## 2022-09-07 SDOH — ECONOMIC STABILITY: FOOD INSECURITY: WITHIN THE PAST 12 MONTHS, YOU WORRIED THAT YOUR FOOD WOULD RUN OUT BEFORE YOU GOT MONEY TO BUY MORE.: NEVER TRUE

## 2022-09-07 ASSESSMENT — SOCIAL DETERMINANTS OF HEALTH (SDOH): HOW HARD IS IT FOR YOU TO PAY FOR THE VERY BASICS LIKE FOOD, HOUSING, MEDICAL CARE, AND HEATING?: NOT HARD AT ALL

## 2022-09-07 ASSESSMENT — PATIENT HEALTH QUESTIONNAIRE - PHQ9
SUM OF ALL RESPONSES TO PHQ QUESTIONS 1-9: 0
2. FEELING DOWN, DEPRESSED OR HOPELESS: 0
SUM OF ALL RESPONSES TO PHQ QUESTIONS 1-9: 0
1. LITTLE INTEREST OR PLEASURE IN DOING THINGS: 0
SUM OF ALL RESPONSES TO PHQ9 QUESTIONS 1 & 2: 0
SUM OF ALL RESPONSES TO PHQ QUESTIONS 1-9: 0
SUM OF ALL RESPONSES TO PHQ QUESTIONS 1-9: 0

## 2022-09-07 ASSESSMENT — ENCOUNTER SYMPTOMS
NAUSEA: 0
DIARRHEA: 0
ABDOMINAL PAIN: 0
VOMITING: 0
SHORTNESS OF BREATH: 0

## 2022-09-07 NOTE — PROGRESS NOTES
Penelope Arnold (:  1945) is a 76 y.o. female,New patient, here for evaluation of the following chief complaint(s):  New Patient    Previous PCP: Dr Nazia Manzo  Currently at [facility]. Plan to move to assisted care d/t worsening memory. Needing help with ADLs- bathing, dressing, preparing food, managing medications. ASSESSMENT/PLAN:  1. Diabetes mellitus of other type without complication, unspecified whether long term insulin use ([address]  Assessment & Plan:   Well-controlled, continue current medications pending work up below   Discussed with son, Ronaldo Emery- may consider decreasing Metformin and/or switching to ER formulation for better compliance. Recommend current dose of Lantus: 10 units/bedtime. Orders:  -     Hemoglobin A1C; Future  -     TSH; Future  -     T4, Free; Future  -     CBC with Auto Differential; Future  2. Essential hypertension  Assessment & Plan:   Well-controlled, continue current medications  3. Pure hypercholesterolemia  Assessment & Plan:   Unclear control, awaiting work up below  Orders:  -     Lipid, Fasting; Future  -     Comprehensive Metabolic Panel, Fasting; Future  4. Personal history of breast cancer  Assessment & Plan:   Managed by [facility]  5. Alzheimer disease [facility])  Assessment & Plan:   Worsening per son since stopping aricept and Namenda in May. Pt currently living at [facility]. Will be moving to Assisted living 22, needing eval today with forms completed. Pt is not able to care for self- bathing, grooming, incontinent of urine, preparing food or medication compliance. Frequently forgets to take medications and eat. Agree with assisted living, forms completed today  Resume Aricept. If tolerating well, will add in Namenda at 2 month follow up. Advised son will likely see decline with move to assisted living and new setting. Orders:  -     donepezil (ARICEPT) 5 MG tablet;  Take 1 tablet by mouth nightly, Disp-90 tablet, R-3Normal  6. Meniere's disease, unspecified laterality  Assessment & Plan:   Well-controlled, continue current medications   Continue ambulating with cane with walker  7. Bilateral hearing loss, unspecified hearing loss type  Assessment & Plan:   Encouraged to wear hearing aids   Frequently forgets to charge overnight    Return in about 2 months (around 11/7/2022) for AWV. Subjective   SUBJECTIVE/OBJECTIVE:  HPI  Diabetes Follow-up--   Lab Results   Component Value Date    LABA1C 6.4 06/16/2022      Lab Results   Component Value Date    .0 06/16/2022     Checks sugars at home:Yes. fasting range: 100's-200's. Last Eye Exam was 6-7 months ago. Pt is on ACEI or ARB. Pt complains ofnone. Pt denies foot ulcerations, paresthesia of the feet, polydipsia, and polyuria. Metformin 1000 mg BID  Lantus: 10 units nightly  Kwesi patch to check sugars. HTN--Pt seen here for follow upregarding HTN. Pt denies chest pain, dyspnea, headache, palpitations, and peripheral edema. Pt complains of none. Tolerating medications: Yes. Hyperlipidemia:  No Rx medications. Pt is not following Lifestyle modification including Low fat/low cholesterol diet,low carbohydrate diet, and does not exercise regularly. H/o Breast cancer: managed by [facility]. Taking Tamoxifen, plan to stop in November. Was treated with lumpectomy. Alzheimer's: diagnosed within last 2 yrs, accelerated decline in past 2 months since stopping Aricept and Namenda. Planning to move pt to assisted living at [facility]. Unable to complete ADLs. Forgets to eat, forgets to take medication, has lost her new glasses, forgets to charge and wear her hearing aids. No falls in past year, ambulates with walker or cane. Frequently forgets evening medications. Son and family friend check on her daily. No behavioral concerns. No safety concerns- pt does not wander. No longer drives. Meniere's disease: per son, better lately.  Will occasionally get dizzy. Thinks Dementia meds may have been stopped d/t frequent dizziness. Review of Systems   HENT:          [place]     Respiratory:  Negative for shortness of breath. Cardiovascular:  Negative for chest pain, palpitations and leg swelling. Gastrointestinal:  Negative for abdominal pain, diarrhea, nausea and vomiting. Endocrine: Negative for polydipsia. Musculoskeletal:  Negative for myalgias. Neurological:  Negative for dizziness and headaches. Objective   Physical Exam  Vitals reviewed. Constitutional:       Appearance: Normal appearance. She is well-developed. Comments: Appears unkempt- hair unwashed   Cardiovascular:      Rate and Rhythm: Normal rate and regular rhythm. Pulmonary:      Effort: Pulmonary effort is normal.      Breath sounds: Normal breath sounds. Skin:     General: Skin is warm and dry. Neurological:      Mental Status: She is alert. She is disoriented. Comments: Oriented to person only              An electronic signature was used to authenticate this note.     --Juan F Maciel, APRN - CNP

## 2022-09-07 NOTE — ASSESSMENT & PLAN NOTE
Well-controlled, continue current medications pending work up below   Discussed with sonTristan- may consider decreasing Metformin and/or switching to ER formulation for better compliance. Recommend current dose of Lantus: 10 units/bedtime.

## 2022-09-07 NOTE — PATIENT INSTRUCTIONS
Complete labs 1 week prior to our 2 month follow up  Lab hours: Monday- Friday 7:30 am-3:30 pm  No appointment necessary, first come first serve. Sign in at . Nothing but water for 10 hours for fasting labs. Increase water 24 hours before lab draw.

## 2022-09-07 NOTE — ASSESSMENT & PLAN NOTE
Worsening per son since stopping aricept and Namenda in May. Pt currently living at [facility]. Will be moving to Assisted living 9/20/22, needing eval today with forms completed. Pt is not able to care for self- bathing, grooming, incontinent of urine, preparing food or medication compliance. Frequently forgets to take medications and eat. Agree with assisted living, forms completed today  Resume Aricept. If tolerating well, will add in Namenda at 2 month follow up. Advised son will likely see decline with move to assisted living and new setting.

## 2022-09-07 NOTE — LETTER
[address]  Phone: [phone]  Fax: [phone]    LUCIANA Edawrd CNP         September 7, 2022     Patient: Jasiel Amaro   YOB: 1945   Date of Visit: 9/7/2022       To Whom It May Concern: It is my medical opinion that Amish Holland requires a disability parking placard for the following reasons:  She cannot walk 200 feet without stopping to rest.  Duration of need: 5 years    If you have any questions or concerns, please don't hesitate to call.     Sincerely,        LUCIANA Edward CNP

## 2022-10-24 ENCOUNTER — HOSPITAL ENCOUNTER (EMERGENCY)
Age: 77
Discharge: HOME | End: 2022-10-24
Attending: EMERGENCY MEDICINE
Payer: MEDICARE

## 2022-10-24 ENCOUNTER — APPOINTMENT (OUTPATIENT)
Dept: CT IMAGING | Age: 77
End: 2022-10-24
Payer: MEDICARE

## 2022-10-24 VITALS
RESPIRATION RATE: 16 BRPM | DIASTOLIC BLOOD PRESSURE: 67 MMHG | TEMPERATURE: 97.6 F | HEART RATE: 64 BPM | OXYGEN SATURATION: 99 % | SYSTOLIC BLOOD PRESSURE: 160 MMHG

## 2022-10-24 DIAGNOSIS — S09.90XA CLOSED HEAD INJURY, INITIAL ENCOUNTER: ICD-10-CM

## 2022-10-24 DIAGNOSIS — E04.9 THYROID ENLARGED: ICD-10-CM
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
## 2022-10-24 PROCEDURE — 90471 IMMUNIZATION ADMIN: CPT | Performed by: PHYSICIAN ASSISTANT

## 2022-10-24 PROCEDURE — 6360000002 HC RX W HCPCS: Performed by: PHYSICIAN ASSISTANT

## 2022-10-24 PROCEDURE — 99284 EMERGENCY DEPT VISIT MOD MDM: CPT

## 2022-10-24 PROCEDURE — 72125 CT NECK SPINE W/O DYE: CPT

## 2022-10-24 PROCEDURE — 70450 CT HEAD/BRAIN W/O DYE: CPT

## 2022-10-24 PROCEDURE — 96372 THER/PROPH/DIAG INJ SC/IM: CPT

## 2022-10-24 PROCEDURE — 90714 TD VACC NO PRESV 7 YRS+ IM: CPT | Performed by: PHYSICIAN ASSISTANT

## 2022-10-24 RX ADMIN — CLOSTRIDIUM TETANI TOXOID ANTIGEN (FORMALDEHYDE INACTIVATED) AND CORYNEBACTERIUM DIPHTHERIAE TOXOID ANTIGEN (FORMALDEHYDE INACTIVATED) 0.5 ML: 5; 2 INJECTION, SUSPENSION INTRAMUSCULAR at 00:56

## 2022-10-24 ASSESSMENT — ENCOUNTER SYMPTOMS
SHORTNESS OF BREATH: 0
ABDOMINAL PAIN: 0
DIARRHEA: 0
COUGH: 0
VOMITING: 0
NAUSEA: 0
RHINORRHEA: 0

## 2022-10-24 ASSESSMENT — PAIN - FUNCTIONAL ASSESSMENT: PAIN_FUNCTIONAL_ASSESSMENT: NONE - DENIES PAIN

## 2022-10-24 NOTE — DISCHARGE INSTRUCTIONS
Your thyroid was enlarged today on your CT scan.  Please call your primary care physician in 2-3 days to have this evaluated further

## 2022-10-24 NOTE — ED PROVIDER NOTES
[facility]    Physician Attestation    Pt Name: Nancy Cortes  MRN: [id number]  [place] 1945  Date of evaluation: 10/24/22        Physician Note:    I havepersonally performed and/or participated in the history, exam and medical decision making and agree with all pertinent clinical information. I have also reviewed and agree with the past medical, family and social historyunless otherwise noted. I have personally performed a face to face diagnostic evaluation onthis patient. I have reviewed the mid-levels findings and agree. History: Patient has vertigo and fell backwards striking the back of her head no loss of consciousness no blood thinners patient has no other complaints      REVIEW OF SYSTEMS    Constitutional:  Denies fever, chills, or weakness   Eyes:  Denies vision changes  HENT:  Denies sore throat or neck pain   Respiratory:  Denies cough or shortness of breath   Cardiovascular:  Denies chest pain  GI:  Denies abdominal pain, nausea, vomiting, or diarrhea   Musculoskeletal:  Denies back pain   Skin: no rash or vesicles   Neurologic:  no headache weakness focal    Lymphatic:  no swollen  nodes     All systems negative except as marked. General Appearance:  Alert, cooperative, no distress, appears stated age. Head:  Normocephalic, without obvious abnormality, atraumatic. Eyes:  conjunctiva/corneas clear, EOM's intact. Sclera anicteric. ENT: Mucous membranes moist.   Neck: Supple, symmetrical, trachea midline, no adenopathy. No jugular venous distention. Some diffuse enlargement of the thyroid non tender   Lungs:   No Respiratory Distress. no rales  rhonchi rub   Chest Wall:  Nontender  no deformity   Heart:  Rsr no murmer gallop    Abdomen:   Soft nontender no organomegally    Extremities:  Full range of motion. no deformity   Pulses: Equal  upper and lower    Skin:  No rashes or lesions to exposed skin.

## 2022-10-24 NOTE — ED PROVIDER NOTES
[facility] [facility]        Pt Name: Miriam Funes  MRN: [id number]  [place] 1945  Date of evaluation: 10/24/2022  Provider: Shady Paz PA-C  PCP: LUCIANA Mallory CNP  Note Started: 12:51 AM EDT       ESTEFANI. I have evaluated this patient. My supervising physician was available for consultation. CHIEF COMPLAINT       Chief Complaint   Patient presents with    Fall     Pt from [facility] via EMS, fall, noted hematoma on back of head, denies blood thinners, cannot tell us where any pain is. HISTORY OF PRESENT ILLNESS   (Location, Timing/Onset, Context/Setting, Quality, Duration, Modifying Factors, Severity, Associated Signs and Symptoms)  Note limiting factors. Chief Complaint: Heaven Villafana is a 76 y.o. female who presents to the emergency department today for evaluation for a fall. The patient has a history of dementia, however when she arrived to the ED is alert and oriented x3. Patient does have a history of vertigo, and this is chronic. Patient states that due to her vertigo, this will cause her to have \"balance issues\", she states that this will cause her to fall. The patient states that she did have a fall tonight, and where she hit the back of her head. There is no loss of consciousness. No vomiting. She is not on any blood thinners. She is acting at baseline. When she arrived to the ED she denies having any dizziness, no lightheadedness. She has no headaches per no visual changes. No numbness, tingling or weakness. Denies having any chest pain, shortness of breath before she fell. Patient is supposed to be walking with a cane, and walker, however did not seem to be doing this tonight, she does have a history of Ménière's disease. Nursing Notes were all reviewed and agreed with or any disagreements were addressed in the HPI.     REVIEW OF SYSTEMS    (2-9 systems for level 4, 10 or more for level 5)     Review of Systems   Constitutional:  Negative for activity change, appetite change, chills and fever. HENT:  Negative for congestion and rhinorrhea. Respiratory:  Negative for cough and shortness of breath. Cardiovascular:  Negative for chest pain. Gastrointestinal:  Negative for abdominal pain, diarrhea, nausea and vomiting. Genitourinary:  Negative for difficulty urinating, dysuria and hematuria. Skin:  Positive for wound. Neurological:  Negative for weakness and numbness. Positives and Pertinent negatives as per HPI. Except as noted above in the ROS, all other systems were reviewed and negative.        PAST MEDICAL HISTORY     Past Medical History:   Diagnosis Date    Arthritis     hands, spine    Cancer ([address]     LEFT BREAST    Cataract     Crohn disease ([address]     POSSIBLE DIAGNOSIS    Dementia ([address]     Dental bridge present     lower permanent    Dental crowns present     Venear upper anterior    Diabetes mellitus ([address]     IDDM    Encounter for loop recorder at end of battery life 11/17/2014    Pt has [employer] Linq implant     Goiter     Hearing impairment     severe, secondary to Meniere's disease    Herniated lumbar intervertebral disc 4/14/2016    History of pancreatitis 01/01/1976    Hypercholesteremia     Hypertension     IBS (irritable bowel syndrome)     LBBB (left bundle branch block)     Osteoarthritis of foot 3/21/2014    Recurrent UTI     Right-sided low back pain with right-sided sciatica 3/23/2016    Rosacea 1/28/2015    Spinal stenosis     Tibialis posterior tendinitis 3/25/2013    Type II or unspecified type diabetes mellitus without mention of complication, not stated as uncontrolled          SURGICAL HISTORY     Past Surgical History:   Procedure Laterality Date    AXILLARY SURGERY Left     BREAST BIOPSY  10/5/12    BREAST LUMPECTOMY  11 6 12    with axillary nodedissection    CARDIAC SURGERY  2010    coronary angiogram-no blockage    CARPAL TUNNEL RELEASE bilateral    COLONOSCOPY      EYE SURGERY Bilateral     Cataracts    HYSTERECTOMY (CERVIX STATUS UNKNOWN)      INSERTABLE CARDIAC MONITOR      LUMBAR SPINE SURGERY  2016    BILATERAL DECOMPRESSIVE LUMBAR LAMINECTOMY L4-L5, RIGHT         CURRENTMEDICATIONS       Previous Medications    AMLODIPINE (NORVASC) 5 MG TABLET    TAKE 1 TABLET BY MOUTH EVERY DAY    CONTINUOUS BLOOD GLUC SENSOR (FREESTYLE HENRRY 14 DAY SENSOR) MISC    USE AS DIRECTED TO CHECK GLUCOSE    DONEPEZIL (ARICEPT) 5 MG TABLET    Take 1 tablet by mouth nightly    INSULIN GLARGINE (LANTUS) 100 UNIT/ML INJECTION    Inject 24-26 Units into the skin nightly     LISINOPRIL-HYDROCHLOROTHIAZIDE (PRINZIDE;ZESTORETIC) 20-25 MG PER TABLET    Take 2 tablets by mouth daily.       METFORMIN (GLUCOPHAGE) 500 MG TABLET    Take 2 tablets by mouth 2 times daily (with meals) 2 tabs twice daily    TAMOXIFEN (NOLVADEX) 20 MG TABLET    TAKE 1 TABLET BY MOUTH EVERY DAY         ALLERGIES     Amoxicillin, Amoxicillin-pot clavulanate, Nitrofuran derivatives, and Bactrim    FAMILYHISTORY       Family History   Problem Relation Age of Onset    Heart Disease Mother     Cancer Father     Diabetes Other     High Cholesterol Neg Hx     High Blood Pressure Neg Hx           SOCIAL HISTORY       Social History     Tobacco Use    Smoking status: Former     Packs/day: 0.00     Years: 0.00     Pack years: 0.00     Types: Cigarettes     Quit date: 1984     Years since quittin.5    Smokeless tobacco: Never    Tobacco comments:     Stoppedsmoing in    Vaping Use    Vaping Use: Never used   Substance Use Topics    Alcohol use: No     Alcohol/week: 0.0 standard drinks    Drug use: No       SCREENINGS    Crum Lynne Coma Scale  Eye Opening: Spontaneous  Best Verbal Response: Oriented  Best Motor Response: Obeys commands  Vanessa Coma Scale Score: 15        PHYSICAL EXAM    (up to 7 for level 4, 8 or more for level 5)     ED Triage Vitals [10/24/22 0020]   BP Temp Temp Source Heart Rate Resp SpO2 Height Weight   (!) 160/67 97.6 °F (36.4 °C) Oral 64 16 99 % -- --       Physical Exam  Vitals and nursing note reviewed. Constitutional:       Appearance: She is well-developed. She is not diaphoretic. HENT:      Head: Normocephalic and atraumatic. Comments: Hematoma noted to the left parietal scalp with overlying abrasion. No laceration. No bogginess, no bony step-offs or crepitus. Right Ear: External ear normal.      Left Ear: External ear normal.      Nose: Nose normal.      Mouth/Throat:      Mouth: Mucous membranes are moist.      Pharynx: Oropharynx is clear. No posterior oropharyngeal erythema. Eyes:      General:         Right eye: No discharge. Left eye: No discharge. Extraocular Movements: Extraocular movements intact. Conjunctiva/sclera: Conjunctivae normal.      Pupils: Pupils are equal, round, and reactive to light. Neck:      Trachea: No tracheal deviation. Cardiovascular:      Rate and Rhythm: Normal rate and regular rhythm. Pulmonary:      Effort: Pulmonary effort is normal. No respiratory distress. Breath sounds: Normal breath sounds. No wheezing or rales. Abdominal:      General: Bowel sounds are normal. There is no distension. Palpations: Abdomen is soft. Tenderness: There is no abdominal tenderness. There is no guarding. Musculoskeletal:         General: Normal range of motion. Cervical back: Normal range of motion and neck supple. Comments: No midline spinal tenderness   Skin:     General: Skin is warm and dry. Neurological:      General: No focal deficit present. Mental Status: She is alert and oriented to person, place, and time. Psychiatric:         Behavior: Behavior normal.       DIAGNOSTIC RESULTS   LABS:    Labs Reviewed - No data to display    When ordered only abnormal lab results are displayed. All other labs were within normal range or not returned as of this dictation. EKG:  When ordered, EKG's are interpreted by the Emergency Department Physician in the absence of a cardiologist.  Please see their note for interpretation of EKG. RADIOLOGY:   Non-plain film images such as CT, Ultrasound and MRI are read by the radiologist. Plain radiographic images are visualized and preliminarily interpreted by the ED Provider with the below findings:        Interpretation per the Radiologist below, if available at the time of this note:    CT CERVICAL SPINE WO CONTRAST   Final Result   Moderate degenerative disc changes throughout the lower cervical spine with   no acute abnormality seen. Minimal subluxation of C7 on T1 which is probably degenerative in etiology. If the patient remains focally symptomatic at this level, suggest ultrasound   correlation. Small disc osteophyte complexes at C5-6 and C6-7. Moderate osteoarthritic changes of the facets throughout. Moderately enlarged and heterogeneous thyroid goiter causing mild mass effect   along the trachea. Recommend ultrasound follow-up. CT HEAD WO CONTRAST   Final Result   Probable moderate old left MCA infarct anteriorly. Mild atrophy and mild chronic microischemic disease in the deep white matter   with no acute abnormality seen. No results found. PROCEDURES   Unless otherwise noted below, none     Procedures    CRITICAL CARE TIME       CONSULTS:  None      EMERGENCY DEPARTMENT COURSE and DIFFERENTIAL DIAGNOSIS/MDM:   Vitals:    Vitals:    10/24/22 0020   BP: (!) 160/67   Pulse: 64   Resp: 16   Temp: 97.6 °F (36.4 °C)   TempSrc: Oral   SpO2: 99%       Patient was given the following medications:  Medications   tetanus & diphtheria toxoids (adult) 5-2 LFU injection 0.5 mL (0.5 mLs IntraMUSCular Given 10/24/22 0056)         Is this patient to be included in the SEP-1 Core Measure due to severe sepsis or septic shock?    No   Exclusion criteria - the patient is NOT to be included for SEP-1 Core Measure due to: Infection is not suspected    Recently, this is a 78-year-old female who presents to the emergency department today for evaluation for a fall. Patient states that she does have a history of Ménière's disease/chronic vertigo, she is mostly walking with a cane and walker does not seem that she was doing this tonight which caused her to fall. She did hit her head no loss of consciousness. No vomiting    On examination, she does have a hematoma noted to the left parietal scalp with overlying abrasion, there are no focal neurological deficits    Patient's vital signs are stable, it seems that her fall today was due to her chronic Ménière's disease and not walking with a walker/cane. I do not feel that she needs any lab work as she is alert and oriented x3 and has no complaints, stable vital signs and this would otherwise not     Will update tetanus due to the abrasion. CT of head and cervical spine obtained    CT of head shows moderate area of encephalomalacia along the left frontal parietal region. No acute process. CT of cervical spine shows no acute fracture, does show an enlarged thyroid. Patient was made aware of this and will have this followed up as an outpatient. Patient otherwise is stable for discharge home and will be returned to her nursing facility in stable condition. No results found for this visit on 10/24/22. I estimate there is LOW risk for SUBARACHNOID HEMORRHAGE, MENINGITIS, INTRACRANIAL HEMORRHAGE, SUBDURAL HEMATOMA, OR STROKE, thus I consider the discharge disposition reasonable. Afshan Mckinney and I have discussed the diagnosis and risks, and we agree with discharging home to follow-up with their primary doctor. We also discussed returning to the Emergency Department immediately if new or worsening symptoms occur.  We have discussed the symptoms which are most concerning (e.g., changing or worsening pain, weakness, vomiting, fever) that necessitate immediate return. FINAL IMPRESSION      1. Fall, initial encounter    2. Closed head injury, initial encounter    3. Thyroid enlarged          DISPOSITION/PLAN   DISPOSITION Decision To Discharge 10/24/2022 01:36:20 AM      PATIENT REFERRED TO:  LUCIANA Blakely CNP  [address] [place] [address]  [phone]    Schedule an appointment as soon as possible for a visit in 2 days      [facility] Emergency Department  [address] [facility]  [phone]    As needed, If symptoms worsen    DISCHARGE MEDICATIONS:  New Prescriptions    No medications on file       DISCONTINUED MEDICATIONS:  Discontinued Medications    No medications on file              (Please note that portions of this note were completed with a voice recognition program.  Efforts were made to edit the dictations but occasionally words are mis-transcribed.)    Tawnya Fernandez PA-C (electronically signed)            Tawnya Fernandez PA-C  10/24/22 0141

## 2022-10-24 NOTE — ED NOTES
Patient report given to EMS, nursing home aware patient is on her way back. Patient left this ER in stable condition.       Leona Villarreal RN  10/24/22 [id number]

## 2022-10-25 ENCOUNTER — OFFICE VISIT (OUTPATIENT)
Dept: FAMILY MEDICINE CLINIC | Age: 77
End: 2022-10-25
Payer: MEDICARE

## 2022-10-25 VITALS
HEART RATE: 63 BPM | BODY MASS INDEX: 24.95 KG/M2 | SYSTOLIC BLOOD PRESSURE: 138 MMHG | WEIGHT: 154.6 LBS | OXYGEN SATURATION: 98 % | DIASTOLIC BLOOD PRESSURE: 82 MMHG

## 2022-10-25 DIAGNOSIS — E13.9 DIABETES MELLITUS OF OTHER TYPE WITHOUT COMPLICATION, UNSPECIFIED WHETHER LONG TERM INSULIN USE (HCC): Chronic | ICD-10-CM

## 2022-10-25 DIAGNOSIS — E04.9 THYROID GOITER: ICD-10-CM

## 2022-10-25 DIAGNOSIS — R39.9 UTI SYMPTOMS: ICD-10-CM

## 2022-10-25 DIAGNOSIS — F02.818 ALZHEIMER'S DEMENTIA WITH BEHAVIORAL DISTURBANCE (HCC): ICD-10-CM

## 2022-10-25 DIAGNOSIS — G30.9 ALZHEIMER'S DEMENTIA WITH BEHAVIORAL DISTURBANCE (HCC): ICD-10-CM

## 2022-10-25 DIAGNOSIS — R41.89 COGNITIVE AND BEHAVIORAL CHANGES: ICD-10-CM
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
## 2022-10-25 DIAGNOSIS — N30.00 ACUTE CYSTITIS WITHOUT HEMATURIA: ICD-10-CM
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
## 2022-10-25 DIAGNOSIS — R46.89 COGNITIVE AND BEHAVIORAL CHANGES: ICD-10-CM

## 2022-10-25 DIAGNOSIS — E78.00 PURE HYPERCHOLESTEROLEMIA: ICD-10-CM

## 2022-10-25 DIAGNOSIS — N30.00 ACUTE CYSTITIS WITHOUT HEMATURIA: Primary | ICD-10-CM

## 2022-10-25 LAB
A/G RATIO: 2.2 (ref 1.1–2.2)
ALBUMIN SERPL-MCNC: 4.2 G/DL (ref 3.4–5)
ALP BLD-CCNC: 83 U/L (ref 40–129)
ALT SERPL-CCNC: 13 U/L (ref 10–40)
ANION GAP SERPL CALCULATED.3IONS-SCNC: 19 MMOL/L (ref 3–16)
AST SERPL-CCNC: 16 U/L (ref 15–37)
BASOPHILS ABSOLUTE: 0.1 K/UL (ref 0–0.2)
BASOPHILS RELATIVE PERCENT: 0.7 %
BILIRUB SERPL-MCNC: 0.7 MG/DL (ref 0–1)
BILIRUBIN, POC: NORMAL
BLOOD URINE, POC: NORMAL
BUN BLDV-MCNC: 30 MG/DL (ref 7–20)
CALCIUM SERPL-MCNC: 9.2 MG/DL (ref 8.3–10.6)
CHLORIDE BLD-SCNC: 91 MMOL/L (ref 99–110)
CHOLESTEROL, FASTING: 225 MG/DL (ref 0–199)
CLARITY, POC: NORMAL
CO2: 22 MMOL/L (ref 21–32)
COLOR, POC: YELLOW
CREAT SERPL-MCNC: 1.2 MG/DL (ref 0.6–1.2)
EOSINOPHILS ABSOLUTE: 0 K/UL (ref 0–0.6)
EOSINOPHILS RELATIVE PERCENT: 0.1 %
GFR SERPL CREATININE-BSD FRML MDRD: 46 ML/MIN/{1.73_M2}
GLUCOSE FASTING: 249 MG/DL (ref 70–99)
GLUCOSE URINE, POC: 500
HCT VFR BLD CALC: 37.1 % (ref 36–48)
HDLC SERPL-MCNC: 51 MG/DL (ref 40–60)
HEMOGLOBIN: 12.5 G/DL (ref 12–16)
KETONES, POC: 40
LDL CHOLESTEROL CALCULATED: 128 MG/DL
LEUKOCYTE EST, POC: NORMAL
LYMPHOCYTES ABSOLUTE: 0.8 K/UL (ref 1–5.1)
LYMPHOCYTES RELATIVE PERCENT: 9 %
MCH RBC QN AUTO: 32.9 PG (ref 26–34)
MCHC RBC AUTO-ENTMCNC: 33.7 G/DL (ref 31–36)
MCV RBC AUTO: 97.7 FL (ref 80–100)
MONOCYTES ABSOLUTE: 0.4 K/UL (ref 0–1.3)
MONOCYTES RELATIVE PERCENT: 4.2 %
NEUTROPHILS ABSOLUTE: 7.3 K/UL (ref 1.7–7.7)
NEUTROPHILS RELATIVE PERCENT: 86 %
NITRITE, POC: NORMAL
PDW BLD-RTO: 13.6 % (ref 12.4–15.4)
PH, POC: 6
PLATELET # BLD: 230 K/UL (ref 135–450)
PMV BLD AUTO: 9.2 FL (ref 5–10.5)
POTASSIUM SERPL-SCNC: 3.8 MMOL/L (ref 3.5–5.1)
PROTEIN, POC: 100
RBC # BLD: 3.8 M/UL (ref 4–5.2)
SODIUM BLD-SCNC: 132 MMOL/L (ref 136–145)
SPECIFIC GRAVITY, POC: >=1.03
T4 FREE: 1.7 NG/DL (ref 0.9–1.8)
TOTAL PROTEIN: 6.1 G/DL (ref 6.4–8.2)
TRIGLYCERIDE, FASTING: 228 MG/DL (ref 0–150)
TSH SERPL DL<=0.05 MIU/L-ACNC: 1.8 UIU/ML (ref 0.27–4.2)
UROBILINOGEN, POC: 0.2
VLDLC SERPL CALC-MCNC: 46 MG/DL
WBC # BLD: 8.5 K/UL (ref 4–11)

## 2022-10-25 PROCEDURE — 1123F ACP DISCUSS/DSCN MKR DOCD: CPT | Performed by: NURSE PRACTITIONER

## 2022-10-25 PROCEDURE — 81002 URINALYSIS NONAUTO W/O SCOPE: CPT | Performed by: NURSE PRACTITIONER

## 2022-10-25 PROCEDURE — 99214 OFFICE O/P EST MOD 30 MIN: CPT | Performed by: NURSE PRACTITIONER

## 2022-10-25 PROCEDURE — 3044F HG A1C LEVEL LT 7.0%: CPT | Performed by: NURSE PRACTITIONER

## 2022-10-25 RX ORDER — CIPROFLOXACIN 250 MG/1
250 TABLET, FILM COATED ORAL 2 TIMES DAILY
Qty: 14 TABLET | Refills: 0 | Status: SHIPPED | OUTPATIENT
Start: 2022-10-25 | End: 2022-10-25 | Stop reason: SDUPTHER

## 2022-10-25 RX ORDER — CIPROFLOXACIN 250 MG/1
250 TABLET, FILM COATED ORAL 2 TIMES DAILY
Qty: 14 TABLET | Refills: 0 | Status: ON HOLD | OUTPATIENT
Start: 2022-10-25 | End: 2022-10-29 | Stop reason: HOSPADM

## 2022-10-25 ASSESSMENT — ENCOUNTER SYMPTOMS: SHORTNESS OF BREATH: 0

## 2022-10-25 NOTE — LETTER
[address]  Phone: [phone]  Fax: [phone]    LUCIANA Hahn CNP        October 25, 2022     Patient: Damaris Hardy   YOB: 1945   Date of Visit: 10/25/2022       To Whom it May Concern:    Gumaro Zambrano was seen in my office on 10/25/2022. I am discontinuing Metformin today. If you have any questions or concerns, please don't hesitate to call.     Sincerely,         LUCIANA Hahn CNP

## 2022-10-25 NOTE — PROGRESS NOTES
SUBJECTIVE:  Pt is a of 76 y.o. female comes in today with   Chief Complaint   Patient presents with    Follow-Up from Hospital     Pt fell, not eating, and dizziness    Hallucinations    Discuss Labs       Patient presenting today for evaluation of ED follow up. [facility] ED yesterday for fall. H/o vertigo that causes balance issues. Marcelo Kays backwards yesterday, striking head on floor. No LOC. No blood thinners. Small abrasions to back of scalp. No longer bleeding. Son reports when patient came home from ED, she fell again. No head injury. She was at baseline so no re-eval at ED. Pt lives at [facility]. Moved to assisted living 1 month ago. Worsening hallucinations, usually in evening. Per son, she appears to be more depressed. 4 days in a row did not go to cafeteria for lunch. (+) weight loss    Pt states before each fall, felt dizzy and like she was going to pass out. No reoccurrence since. Pt states she is giving herself insulin nightly, son states no insulin in room nor is staff giving it even though they manage all her medications. Prior to Visit Medications    Medication Sig Taking? Authorizing Provider   Continuous Blood Gluc Sensor (FREESTYLE HENRRY 14 DAY SENSOR) [facility] USE AS DIRECTED TO CHECK GLUCOSE Yes Historical Provider, MD   amLODIPine (NORVASC) 5 MG tablet TAKE 1 TABLET BY MOUTH EVERY DAY Yes Historical Provider, MD   donepezil (ARICEPT) 5 MG tablet Take 1 tablet by mouth nightly Yes LUCIANA Pineda - CNP   tamoxifen (NOLVADEX) 20 MG tablet TAKE 1 TABLET BY MOUTH EVERY DAY Yes Joe Jimenez MD   lisinopril-hydrochlorothiazide (PRINZIDE;ZESTORETIC) 20-25 MG per tablet Take 2 tablets by mouth daily.    Yes Historical Provider, MD   insulin glargine (LANTUS) 100 UNIT/ML injection Inject 24-26 Units into the skin nightly  Yes Historical Provider, MD         Patient's allergies, past medical, surgical, social and family histories werereviewed and updated as appropriate. Review of Systems   Constitutional:  Positive for activity change (decreased), appetite change (decreased) and fatigue (sleeping during now for past week). Respiratory:  Negative for shortness of breath. Cardiovascular:  Negative for chest pain and palpitations. Neurological:  Positive for dizziness and light-headedness. Negative for syncope and headaches. Psychiatric/Behavioral:  Positive for hallucinations and sleep disturbance (hypersomnolence). Negative for suicidal ideas. The patient is not nervous/anxious. Physical Exam  Vitals reviewed. Constitutional:       Appearance: Normal appearance. She is well-developed. Neck:      Thyroid: Thyromegaly present. Cardiovascular:      Rate and Rhythm: Normal rate and regular rhythm. Heart sounds: Normal heart sounds. No murmur heard. Pulmonary:      Effort: Pulmonary effort is normal.      Breath sounds: Normal breath sounds. Neurological:      Mental Status: She is alert. Comments: Oriented to place and person     Psychiatric:         Mood and Affect: Mood normal.         Behavior: Behavior normal.         Thought Content: Thought content normal.         Judgment: Judgment normal.     Vitals:    10/25/22 1023   BP: 138/82   Pulse: 63   SpO2: 98%   Weight: 154 lb 9.6 oz (70.1 kg)       10/24/22 CT cervical spine W/O contrast:  Moderate degenerative disc changes throughout the lower cervical spine with   no acute abnormality seen. Minimal subluxation of C7 on T1 which is probably degenerative in etiology. If the patient remains focally symptomatic at this level, suggest ultrasound   correlation. Small disc osteophyte complexes at C5-6 and C6-7. Moderate osteoarthritic changes of the facets throughout. Moderately enlarged and heterogeneous thyroid goiter causing mild mass effect   along the trachea. Recommend ultrasound follow-up. ASSESSMENT:  1. Fall in home, subsequent encounter    2. [facility]'s dementia with behavioral disturbance ([facility]    3. Cognitive and behavioral changes    4. Thyroid goiter        PLAN:  1. Fall in home, subsequent encounter  ED records reviewed  Continue use of rolling walker when standing and ambulating    2. Alzheimer's dementia with behavioral disturbance ([facility]  Worsening  Lengthy discussion with pt, son and friend about worsening dementia- normal d/t recent move, will r/o PRANAV, and UTI with labs today  Continue Aricept unchanged. May consider adding SSRI for depression if labs are normal    3. Cognitive and behavioral changes  -     POCT Urinalysis no Micro: unable to void,. Will take cup home and bring back sample  See # 2    4. Thyroid goiter  New finding per CT Cervical spine 10/24/22  -     US HEAD NECK SOFT TISSUE THYROID; Future      5. Diabetes mellitus of other type without complication, unspecified whether long term insulin use ([facility]  Son will confirm later today if patient is using insulin  D/c Metformin today    See pt instructions  F/u 6 weeks sooner prn  Discussed use, benefit, and side effects of prescribed medications. All patient & son's questions answered. Son voiced understanding.

## 2022-10-26 DIAGNOSIS — E11.9 TYPE 2 DIABETES MELLITUS WITHOUT COMPLICATION, WITH LONG-TERM CURRENT USE OF INSULIN (HCC): Primary | ICD-10-CM

## 2022-10-26 DIAGNOSIS — Z79.4 TYPE 2 DIABETES MELLITUS WITHOUT COMPLICATION, WITH LONG-TERM CURRENT USE OF INSULIN (HCC): Primary | ICD-10-CM

## 2022-10-26 LAB
ESTIMATED AVERAGE GLUCOSE: 211.6 MG/DL
HBA1C MFR BLD: 9 %

## 2022-10-26 RX ORDER — INSULIN GLARGINE 300 U/ML
15 INJECTION, SOLUTION SUBCUTANEOUS NIGHTLY
Qty: 3 ADJUSTABLE DOSE PRE-FILLED PEN SYRINGE | Refills: 1 | Status: ON HOLD | OUTPATIENT
Start: 2022-10-26 | End: 2022-10-29 | Stop reason: HOSPADM

## 2022-10-27 ENCOUNTER — APPOINTMENT (OUTPATIENT)
Dept: GENERAL RADIOLOGY | Age: 77
DRG: 690 | End: 2022-10-27
Payer: MEDICARE

## 2022-10-27 ENCOUNTER — HOSPITAL ENCOUNTER (INPATIENT)
Age: 77
LOS: 2 days | Discharge: HOME | DRG: 690 | End: 2022-10-29
Attending: EMERGENCY MEDICINE | Admitting: INTERNAL MEDICINE
Payer: MEDICARE

## 2022-10-27 ENCOUNTER — APPOINTMENT (OUTPATIENT)
Dept: CT IMAGING | Age: 77
DRG: 690 | End: 2022-10-27
Payer: MEDICARE

## 2022-10-27 DIAGNOSIS — F41.9 ANXIETY: ICD-10-CM

## 2022-10-27 DIAGNOSIS — N17.9 ACUTE KIDNEY INJURY (HCC): ICD-10-CM

## 2022-10-27 DIAGNOSIS — R77.8 ELEVATED TROPONIN: ICD-10-CM

## 2022-10-27 DIAGNOSIS — R29.6 FREQUENT FALLS: ICD-10-CM

## 2022-10-27 DIAGNOSIS — G93.40 ACUTE ENCEPHALOPATHY: Primary | ICD-10-CM

## 2022-10-27 DIAGNOSIS — N30.00 ACUTE CYSTITIS WITHOUT HEMATURIA: ICD-10-CM

## 2022-10-27 PROBLEM — R41.82 ALTERED MENTAL STATUS: Status: ACTIVE | Noted: 2022-10-27

## 2022-10-27 LAB
ANION GAP SERPL CALCULATED.3IONS-SCNC: 18 MMOL/L (ref 3–16)
BASOPHILS ABSOLUTE: 0 K/UL (ref 0–0.2)
BASOPHILS RELATIVE PERCENT: 0 %
BUN BLDV-MCNC: 32 MG/DL (ref 7–20)
CALCIUM SERPL-MCNC: 9.6 MG/DL (ref 8.3–10.6)
CHLORIDE BLD-SCNC: 96 MMOL/L (ref 99–110)
CO2: 22 MMOL/L (ref 21–32)
CREAT SERPL-MCNC: 1.5 MG/DL (ref 0.6–1.2)
EKG ATRIAL RATE: 58 BPM
EKG DIAGNOSIS: NORMAL
EKG P AXIS: 48 DEGREES
EKG P-R INTERVAL: 156 MS
EKG Q-T INTERVAL: 508 MS
EKG QRS DURATION: 146 MS
EKG QTC CALCULATION (BAZETT): 498 MS
EKG R AXIS: -42 DEGREES
EKG T AXIS: 57 DEGREES
EKG VENTRICULAR RATE: 58 BPM
EOSINOPHILS ABSOLUTE: 0 K/UL (ref 0–0.6)
EOSINOPHILS RELATIVE PERCENT: 0 %
GFR SERPL CREATININE-BSD FRML MDRD: 35 ML/MIN/{1.73_M2}
GLUCOSE BLD-MCNC: 322 MG/DL (ref 70–99)
HCT VFR BLD CALC: 39.9 % (ref 36–48)
HEMOGLOBIN: 13.5 G/DL (ref 12–16)
LYMPHOCYTES ABSOLUTE: 0.9 K/UL (ref 1–5.1)
LYMPHOCYTES RELATIVE PERCENT: 10 %
MAGNESIUM: 1.6 MG/DL (ref 1.8–2.4)
MCH RBC QN AUTO: 32.6 PG (ref 26–34)
MCHC RBC AUTO-ENTMCNC: 34 G/DL (ref 31–36)
MCV RBC AUTO: 95.9 FL (ref 80–100)
MONOCYTES ABSOLUTE: 0.2 K/UL (ref 0–1.3)
MONOCYTES RELATIVE PERCENT: 2 %
NEUTROPHILS ABSOLUTE: 8.1 K/UL (ref 1.7–7.7)
NEUTROPHILS RELATIVE PERCENT: 88 %
ORGANISM: ABNORMAL
PDW BLD-RTO: 13.3 % (ref 12.4–15.4)
PLATELET # BLD: 242 K/UL (ref 135–450)
PLATELET SLIDE REVIEW: ADEQUATE
PMV BLD AUTO: 9.2 FL (ref 5–10.5)
POTASSIUM REFLEX MAGNESIUM: 3.2 MMOL/L (ref 3.5–5.1)
RBC # BLD: 4.16 M/UL (ref 4–5.2)
RBC # BLD: NORMAL 10*6/UL
SLIDE REVIEW: ABNORMAL
SODIUM BLD-SCNC: 136 MMOL/L (ref 136–145)
TOTAL CK: 157 U/L (ref 26–192)
TROPONIN: 0.02 NG/ML
URINE CULTURE, ROUTINE: ABNORMAL
WBC # BLD: 9.2 K/UL (ref 4–11)

## 2022-10-27 PROCEDURE — 84484 ASSAY OF TROPONIN QUANT: CPT

## 2022-10-27 PROCEDURE — 96375 TX/PRO/DX INJ NEW DRUG ADDON: CPT

## 2022-10-27 PROCEDURE — 87040 BLOOD CULTURE FOR BACTERIA: CPT

## 2022-10-27 PROCEDURE — 1200000000 HC SEMI PRIVATE

## 2022-10-27 PROCEDURE — 73562 X-RAY EXAM OF KNEE 3: CPT

## 2022-10-27 PROCEDURE — 83735 ASSAY OF MAGNESIUM: CPT

## 2022-10-27 PROCEDURE — 85025 COMPLETE CBC W/AUTO DIFF WBC: CPT

## 2022-10-27 PROCEDURE — 82550 ASSAY OF CK (CPK): CPT

## 2022-10-27 PROCEDURE — 80048 BASIC METABOLIC PNL TOTAL CA: CPT

## 2022-10-27 PROCEDURE — 96376 TX/PRO/DX INJ SAME DRUG ADON: CPT

## 2022-10-27 PROCEDURE — 93010 ELECTROCARDIOGRAM REPORT: CPT | Performed by: INTERNAL MEDICINE

## 2022-10-27 PROCEDURE — 96374 THER/PROPH/DIAG INJ IV PUSH: CPT

## 2022-10-27 PROCEDURE — 96366 THER/PROPH/DIAG IV INF ADDON: CPT

## 2022-10-27 PROCEDURE — G0378 HOSPITAL OBSERVATION PER HR: HCPCS

## 2022-10-27 PROCEDURE — 2580000003 HC RX 258: Performed by: INTERNAL MEDICINE

## 2022-10-27 PROCEDURE — 96365 THER/PROPH/DIAG IV INF INIT: CPT

## 2022-10-27 PROCEDURE — 72125 CT NECK SPINE W/O DYE: CPT

## 2022-10-27 PROCEDURE — 73080 X-RAY EXAM OF ELBOW: CPT

## 2022-10-27 PROCEDURE — 6360000002 HC RX W HCPCS: Performed by: EMERGENCY MEDICINE

## 2022-10-27 PROCEDURE — 96367 TX/PROPH/DG ADDL SEQ IV INF: CPT

## 2022-10-27 PROCEDURE — 93005 ELECTROCARDIOGRAM TRACING: CPT | Performed by: EMERGENCY MEDICINE

## 2022-10-27 PROCEDURE — 36415 COLL VENOUS BLD VENIPUNCTURE: CPT

## 2022-10-27 PROCEDURE — 72100 X-RAY EXAM L-S SPINE 2/3 VWS: CPT

## 2022-10-27 PROCEDURE — 70450 CT HEAD/BRAIN W/O DYE: CPT

## 2022-10-27 PROCEDURE — 2580000003 HC RX 258: Performed by: EMERGENCY MEDICINE

## 2022-10-27 PROCEDURE — 99285 EMERGENCY DEPT VISIT HI MDM: CPT

## 2022-10-27 PROCEDURE — 96372 THER/PROPH/DIAG INJ SC/IM: CPT

## 2022-10-27 PROCEDURE — 6370000000 HC RX 637 (ALT 250 FOR IP): Performed by: EMERGENCY MEDICINE

## 2022-10-27 PROCEDURE — 6360000002 HC RX W HCPCS: Performed by: INTERNAL MEDICINE

## 2022-10-27 RX ORDER — POTASSIUM CHLORIDE 20 MEQ/1
40 TABLET, EXTENDED RELEASE ORAL ONCE
Status: COMPLETED | OUTPATIENT
Start: 2022-10-27 | End: 2022-10-27

## 2022-10-27 RX ORDER — DEXMEDETOMIDINE HYDROCHLORIDE 4 UG/ML
0.2 INJECTION INTRAVENOUS CONTINUOUS
Status: DISCONTINUED | OUTPATIENT
Start: 2022-10-27 | End: 2022-10-27 | Stop reason: SDUPTHER

## 2022-10-27 RX ORDER — DEXTROSE AND SODIUM CHLORIDE 5; .45 G/100ML; G/100ML
INJECTION, SOLUTION INTRAVENOUS CONTINUOUS
Status: DISCONTINUED | OUTPATIENT
Start: 2022-10-27 | End: 2022-10-28

## 2022-10-27 RX ORDER — HALOPERIDOL 5 MG/ML
2 INJECTION INTRAMUSCULAR ONCE
Status: COMPLETED | OUTPATIENT
Start: 2022-10-27 | End: 2022-10-27

## 2022-10-27 RX ORDER — MIDAZOLAM HYDROCHLORIDE 2 MG/2ML
0.5 INJECTION, SOLUTION INTRAMUSCULAR; INTRAVENOUS ONCE
Status: COMPLETED | OUTPATIENT
Start: 2022-10-27 | End: 2022-10-27

## 2022-10-27 RX ORDER — 0.9 % SODIUM CHLORIDE 0.9 %
1000 INTRAVENOUS SOLUTION INTRAVENOUS ONCE
Status: COMPLETED | OUTPATIENT
Start: 2022-10-27 | End: 2022-10-27

## 2022-10-27 RX ORDER — DEXMEDETOMIDINE HYDROCHLORIDE 4 UG/ML
.1-1.5 INJECTION, SOLUTION INTRAVENOUS CONTINUOUS
Status: DISCONTINUED | OUTPATIENT
Start: 2022-10-27 | End: 2022-10-27

## 2022-10-27 RX ORDER — ACETAMINOPHEN 325 MG/1
650 TABLET ORAL ONCE
Status: COMPLETED | OUTPATIENT
Start: 2022-10-27 | End: 2022-10-27

## 2022-10-27 RX ORDER — ENOXAPARIN SODIUM 100 MG/ML
30 INJECTION SUBCUTANEOUS DAILY
Status: DISCONTINUED | OUTPATIENT
Start: 2022-10-27 | End: 2022-10-29 | Stop reason: HOSPADM

## 2022-10-27 RX ORDER — MAGNESIUM SULFATE IN WATER 40 MG/ML
2000 INJECTION, SOLUTION INTRAVENOUS ONCE
Status: COMPLETED | OUTPATIENT
Start: 2022-10-27 | End: 2022-10-27

## 2022-10-27 RX ADMIN — SODIUM CHLORIDE 1000 ML: 9 INJECTION, SOLUTION INTRAVENOUS at 17:53

## 2022-10-27 RX ADMIN — MAGNESIUM SULFATE HEPTAHYDRATE 2000 MG: 40 INJECTION, SOLUTION INTRAVENOUS at 18:22

## 2022-10-27 RX ADMIN — DEXTROSE AND SODIUM CHLORIDE: 5; 450 INJECTION, SOLUTION INTRAVENOUS at 22:35

## 2022-10-27 RX ADMIN — MIDAZOLAM 0.5 MG: 1 INJECTION INTRAMUSCULAR; INTRAVENOUS at 23:40

## 2022-10-27 RX ADMIN — CEFTRIAXONE SODIUM 1000 MG: 1 INJECTION, POWDER, FOR SOLUTION INTRAMUSCULAR; INTRAVENOUS at 17:53

## 2022-10-27 RX ADMIN — ENOXAPARIN SODIUM 30 MG: 100 INJECTION SUBCUTANEOUS at 22:34

## 2022-10-27 RX ADMIN — ACETAMINOPHEN 650 MG: 325 TABLET ORAL at 18:10

## 2022-10-27 RX ADMIN — POTASSIUM CHLORIDE 40 MEQ: 1500 TABLET, EXTENDED RELEASE ORAL at 18:10

## 2022-10-27 RX ADMIN — MIDAZOLAM HYDROCHLORIDE 0.5 MG: 1 INJECTION, SOLUTION INTRAMUSCULAR; INTRAVENOUS at 16:40

## 2022-10-27 RX ADMIN — HALOPERIDOL LACTATE 2 MG: 5 INJECTION, SOLUTION INTRAMUSCULAR at 16:39

## 2022-10-27 ASSESSMENT — PAIN SCALES - GENERAL: PAINLEVEL_OUTOF10: 10

## 2022-10-27 ASSESSMENT — PAIN DESCRIPTION - LOCATION: LOCATION: HEAD;ARM

## 2022-10-27 ASSESSMENT — PAIN DESCRIPTION - ORIENTATION: ORIENTATION: LEFT

## 2022-10-27 ASSESSMENT — PAIN - FUNCTIONAL ASSESSMENT: PAIN_FUNCTIONAL_ASSESSMENT: 0-10

## 2022-10-27 NOTE — ED PROVIDER NOTES
EMERGENCY DEPARTMENT PROVIDER NOTE    Patient Identification  Pt Name: Nancy Cortes  MRN: [id number]  [place] 1945  Date of evaluation: 10/27/2022  Provider: Demarco Montes De Oca DO  PCP: LUCIANA Pena CNP    Chief Complaint  Fall (Pt transported by [facility] from [facility]. Pt was found on the floor and rates head pain a 10/10. Pain to left side is 10/10. Hx of falls, vertigo and confusion.)      HPI  (History provided by patient)  This is a 76 y.o. female with pertinent past medical history of Alzheimer's dementia, Ménière's disease, diabetes, hypertension, high cholesterol who was brought in by EMS transportation for fall. Patient states that she was feeling dizzy and then fell into the wall of the corner of her room. Believes she struck the back of her head however is unsure. She does not believe she lost consciousness. She is uncertain how long she was on the ground, however believes it was less than an hour. She reports aching pain to her left scalp. Nothing seems to make the symptoms any better or worse. Patient is present with friend at bedside, states the patient has seemed more confused than normal over the past few days. She was diagnosed with a urinary tract infection on 10/25/2022 and started on antibiotics. ROS    Unable to obtain due to dementia.     I have reviewed the following nursing documentation:  Allergies: Amoxicillin, Amoxicillin-pot clavulanate, Nitrofuran derivatives, and Bactrim    Past medical history:   Past Medical History:   Diagnosis Date    Arthritis     hands, spine    Cancer ([address]     LEFT BREAST    Cataract     Crohn disease ([address]     POSSIBLE DIAGNOSIS    Dementia ([address]     Dental bridge present     lower permanent    Dental crowns present     Venear upper anterior    Diabetes mellitus ([address]     IDDM    Encounter for loop recorder at end of battery life 11/17/2014    Pt has [employer] Linq implant     Goiter     Hearing impairment     severe, secondary to Meniere's disease    Herniated lumbar intervertebral disc 4/14/2016    History of pancreatitis 01/01/1976    Hypercholesteremia     Hypertension     IBS (irritable bowel syndrome)     LBBB (left bundle branch block)     Osteoarthritis of foot 3/21/2014    Recurrent UTI     Right-sided low back pain with right-sided sciatica 3/23/2016    Rosacea 1/28/2015    Spinal stenosis     Tibialis posterior tendinitis 3/25/2013    Type II or unspecified type diabetes mellitus without mention of complication, not stated as uncontrolled      Past surgical history:   Past Surgical History:   Procedure Laterality Date    AXILLARY SURGERY Left     BREAST BIOPSY  10/5/12    BREAST LUMPECTOMY  11 6 12    with axillary nodedissection    CARDIAC SURGERY  2010    coronary angiogram-no blockage    CARPAL TUNNEL RELEASE      bilateral    COLONOSCOPY      EYE SURGERY Bilateral     Cataracts    HYSTERECTOMY (CERVIX STATUS UNKNOWN)      [address]  04/14/2016    BILATERAL DECOMPRESSIVE LUMBAR LAMINECTOMY L4-L5, RIGHT       Home medications:   Current Discharge Medication List        CONTINUE these medications which have NOT CHANGED    Details   insulin glargine, 1 unit dial, (TOUJEO SOLOSTAR) 300 UNIT/ML concentrated injection pen Inject 15 Units into the skin nightly  Qty: 3 Adjustable Dose Pre-filled Pen Syringe, Refills: 1    Associated Diagnoses: Type 2 diabetes mellitus without complication, with long-term current use of insulin (HCC)      ciprofloxacin (CIPRO) 250 MG tablet Take 1 tablet by mouth 2 times daily for 7 days  Qty: 14 tablet, Refills: 0    Associated Diagnoses: Acute cystitis without hematuria      Continuous Blood Gluc Sensor (FREESTYLE HENRRY 14 DAY SENSOR) MISC USE AS DIRECTED TO CHECK GLUCOSE      amLODIPine (NORVASC) 5 MG tablet TAKE 1 TABLET BY MOUTH EVERY DAY      donepezil (ARICEPT) 5 MG tablet Take 1 tablet by mouth nightly  Qty: 90 tablet, Refills: 3 Associated Diagnoses: Alzheimer disease [address]      tamoxifen (NOLVADEX) 20 MG tablet TAKE 1 TABLET BY MOUTH EVERY DAY  Qty: 90 tablet, Refills: 3      lisinopril-hydrochlorothiazide (PRINZIDE;ZESTORETIC) 20-25 MG per tablet Take 2 tablets by mouth daily. Social history:  reports that she quit smoking about 38 years ago. Her smoking use included cigarettes. She has never used smokeless tobacco. She reports that she does not drink alcohol and does not use drugs. Family history:    Family History   Problem Relation Age of Onset    Heart Disease Mother     Cancer Father     Diabetes Other     High Cholesterol Neg Hx     High Blood Pressure Neg Hx          Exam  ED Triage Vitals [10/27/22 1332]   BP Temp Temp Source Heart Rate Resp SpO2 Height Weight   (!) 126/56 98.5 °F (36.9 °C) Oral 59 15 100 % -- --     Nursing note and vitals reviewed. Constitutional: Well developed, well nourished. Non-toxic in appearance. HENT:      Head: Normocephalic and atraumatic. Abrasion left occipital scalp with small underlying hematoma, appears subacute. No bleeding. No raccoon eyes or fuentes sign. No septal or auricular hematoma. Ears: External ears normal.      Nose: Nose normal.     Mouth: Membrane mucosa moist and pink. Eyes: Anicteric sclera. No discharge. PERRL  Neck: Supple. Trachea midline. No meningismus. No cervical midline tenderness. Cardiovascular: Borderline bradycardia, regular rhythm; no murmurs, rubs, or gallops. Pulmonary/Chest: Effort normal. No respiratory distress. CTAB. No stridor. No wheezes. No rales. Abdominal: Soft. No distension. Nontender to deep palpation all quadrants. Musculoskeletal: Moves all extremities. No gross deformity. Diffuse lumbar paraspinal tenderness, no focal midline tenderness of lumbar or thoracic spine. No step-offs. No overlying skin changes. Tenderness to range of motion of left elbow, no joint effusion or edema.   Tenderness palpation along joint line of medial left knee with small effusion and overlying ecchymosis. Knee joint stable in anterior and posterior drawer. Neurological: Alert and oriented to person and place. Not oriented to month or year. Face symmetric. Speech is clear. CN 2-12 intact. 5/5 motor and sensation grossly intact all extremities. No pronator drift. Gait not tested. Skin: Warm and dry. No rash. Psychiatric: Normal mood and affect. Behavior is normal.    Procedures      EKG    EKG was reviewed by emergency department physician in the absence of a cardiologist    Wide complex sinus rhythm, rate 58, left axis deviation, normal AR and wide QRS intervals, normal Qtc, no specific ST elevations or depressions, normal t-wave morphology, impression sinus bradycardia with LBBB, no STEMI, no significant change from comparison 7/1/2021      Radiology  CT Head W/O Contrast   Final Result   No acute intracranial abnormality. Redemonstration of a CSF filled area in   the left temporal and frontal regions with mild mass effect and midline shift   from left to right also previously seen. 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  Age-appropriate atrophy. CT CSpine W/O Contrast   Final Result   No acute abnormality of the cervical spine. Redemonstration of mild   anterolisthesis of C7 over T1, which is likely degenerative. Mild to   moderate multilevel degenerative disc disease. XR LUMBAR SPINE (2-3 VIEWS)   Final Result   1. No acute left knee abnormality. Moderate tricompartmental degenerative   changes. 2. No acute malalignment or compression injury of the lumbar spine. Severe   multilevel degenerative disc disease. XR KNEE LEFT (3 VIEWS)   Final Result   1. No acute left knee abnormality. Moderate tricompartmental degenerative   changes. 2. No acute malalignment or compression injury of the lumbar spine. Severe   multilevel degenerative disc disease.          XR ELBOW LEFT (MIN 3 VIEWS)   Final Result   No acute radiographic abnormality left elbow.              Labs  Results for orders placed or performed during the hospital encounter of 10/27/22   CBC with Auto Differential   Result Value Ref Range    WBC 9.2 4.0 - 11.0 K/uL    RBC 4.16 4.00 - 5.20 M/uL    Hemoglobin 13.5 12.0 - 16.0 g/dL    Hematocrit 39.9 36.0 - 48.0 %    MCV 95.9 80.0 - 100.0 fL    MCH 32.6 26.0 - 34.0 pg    MCHC 34.0 31.0 - 36.0 g/dL    RDW 13.3 12.4 - 15.4 %    Platelets 061 859 - 805 K/uL    MPV 9.2 5.0 - 10.5 fL    PLATELET SLIDE REVIEW Adequate     SLIDE REVIEW see below     Neutrophils % 88.0 %    Lymphocytes % 10.0 %    Monocytes % 2.0 %    Eosinophils % 0.0 %    Basophils % 0.0 %    Neutrophils Absolute 8.1 (H) 1.7 - 7.7 K/uL    Lymphocytes Absolute 0.9 (L) 1.0 - 5.1 K/uL    Monocytes Absolute 0.2 0.0 - 1.3 K/uL    Eosinophils Absolute 0.0 0.0 - 0.6 K/uL    Basophils Absolute 0.0 0.0 - 0.2 K/uL    RBC Morphology Normal    BMP w/ Reflex to MG   Result Value Ref Range    Sodium 136 136 - 145 mmol/L    Potassium reflex Magnesium 3.2 (L) 3.5 - 5.1 mmol/L    Chloride 96 (L) 99 - 110 mmol/L    CO2 22 21 - 32 mmol/L    Anion Gap 18 (H) 3 - 16    Glucose 322 (H) 70 - 99 mg/dL    BUN 32 (H) 7 - 20 mg/dL    Creatinine 1.5 (H) 0.6 - 1.2 mg/dL    Est, Glom Filt Rate 35 (A) >60    Calcium 9.6 8.3 - 10.6 mg/dL   Troponin   Result Value Ref Range    Troponin 0.02 (H) <0.01 ng/mL   CK   Result Value Ref Range    Total  26 - 192 U/L   Magnesium   Result Value Ref Range    Magnesium 1.60 (L) 1.80 - 2.40 mg/dL   EKG 12 Lead   Result Value Ref Range    Ventricular Rate 58 BPM    Atrial Rate 58 BPM    P-R Interval 156 ms    QRS Duration 146 ms    Q-T Interval 508 ms    QTc Calculation (Bazett) 498 ms    P Axis 48 degrees    R Axis -42 degrees    T Axis 57 degrees    Diagnosis       Sinus bradycardiaLeft axis deviationLeft bundle branch blockConfirmed by Blu Madison ([id number]) on 10/27/2022 3:30:59 PM       Screenings   Vanessa Coma Scale  Eye Opening: Spontaneous  Best Verbal Response: Oriented  Best Motor Response: Obeys commands  Vanessa Coma Scale Score: 15       Is this patient to be included in the SEP-1 Core Measure due to severe sepsis or septic shock? No   Exclusion criteria - the patient is NOT to be included for SEP-1 Core Measure due to:  2+ SIRS criteria are not met      MDM and ED Course    Patient afebrile and nontoxic. On initial evaluation she appears in no edgardo painful or respiratory distress. Patient does seem disoriented, requires repeated prompting to determine where she has pain. Neuro exam is nonfocal, nothing to suggest CVA/TIA. CT head without hemorrhage or mass-effect, there are chronic findings likely secondary to an arachnoid cyst.  CT cervical spine without acute fracture or dislocation. No findings to suggest cord injury on exam.  Plain films of lumbar spine, left elbow and knee are without acute fracture or dislocation. No red flags for cauda equina. EKG with chronic LBBB, no STEMI. Troponin is minimally elevated at 0.02, patient denies chest pain, I suspect troponin elevation secondary to kidney injury, less likely ACS. Labs with do show PRANAV, likely prerenal, no clinically significant electrolyte derangement. Urine sample remains pending, however urinalysis results from 10/25/2022 were reviewed and were culture positive for E. coli. We will treat with ceftriaxone. Patient is not septic. Given her acute on chronic encephalopathy, PRANAV with troponin elevation in setting of infection, patient would benefit from admission for further evaluation and care. I discussed case with Dr. Shruthi Ingram who will admit.   During patient's emergency department course she did become agitated and combative with nursing staff, she continued to be disoriented and I was unable to verbally redirect patient at which point she received haloperidol to improvement, although ultimately required low-dose Precedex infusion for patient and staff safety. I suspect behavioral disturbance related to underlying dementia. I Dr. Alize Nascimento am the primary clinician of record. Final Impression  1. Acute encephalopathy    2. Acute cystitis without hematuria    3. Acute kidney injury ([facility]    4. Elevated troponin    5. Frequent falls        Blood pressure (!) 154/68, pulse 64, temperature 97.9 °F (36.6 °C), temperature source Oral, resp. rate 16, SpO2 100 %. Disposition:  DISPOSITION Admitted 10/27/2022 07:20:54 PM      Patient Referrals:  No follow-up provider specified. Discharge Medications:  Current Discharge Medication List          Discontinued Medications:  Current Discharge Medication List          This chart was generated using the [address] [place] [address] dictation system. I created this record but it may contain dictation errors given the limitations of this technology.     Darci Jha DO (electronically signed)  Attending Emergency Physician       Darci Jha DO  10/27/22 9258

## 2022-10-28 PROBLEM — N39.0 UTI (URINARY TRACT INFECTION): Status: ACTIVE | Noted: 2022-10-28

## 2022-10-28 PROBLEM — E11.65 UNCONTROLLED TYPE 2 DIABETES MELLITUS WITH HYPERGLYCEMIA (HCC): Status: ACTIVE | Noted: 2022-10-28

## 2022-10-28 PROBLEM — E87.6 HYPOKALEMIA: Status: ACTIVE | Noted: 2022-10-28

## 2022-10-28 LAB
ANION GAP SERPL CALCULATED.3IONS-SCNC: 10 MMOL/L (ref 3–16)
BILIRUBIN URINE: NEGATIVE
BLOOD, URINE: NEGATIVE
BUN BLDV-MCNC: 28 MG/DL (ref 7–20)
CALCIUM SERPL-MCNC: 8.5 MG/DL (ref 8.3–10.6)
CHLORIDE BLD-SCNC: 102 MMOL/L (ref 99–110)
CLARITY: CLEAR
CO2: 21 MMOL/L (ref 21–32)
COLOR: YELLOW
CREAT SERPL-MCNC: 1.2 MG/DL (ref 0.6–1.2)
GFR SERPL CREATININE-BSD FRML MDRD: 46 ML/MIN/{1.73_M2}
GLUCOSE BLD-MCNC: 176 MG/DL (ref 70–99)
GLUCOSE BLD-MCNC: 202 MG/DL (ref 70–99)
GLUCOSE BLD-MCNC: 310 MG/DL (ref 70–99)
GLUCOSE BLD-MCNC: 403 MG/DL (ref 70–99)
GLUCOSE URINE: >=1000 MG/DL
HCT VFR BLD CALC: 34.3 % (ref 36–48)
HEMOGLOBIN: 11.8 G/DL (ref 12–16)
KETONES, URINE: NEGATIVE MG/DL
LEUKOCYTE ESTERASE, URINE: NEGATIVE
MCH RBC QN AUTO: 33 PG (ref 26–34)
MCHC RBC AUTO-ENTMCNC: 34.3 G/DL (ref 31–36)
MCV RBC AUTO: 96.3 FL (ref 80–100)
MICROSCOPIC EXAMINATION: ABNORMAL
NITRITE, URINE: NEGATIVE
PDW BLD-RTO: 13.2 % (ref 12.4–15.4)
PERFORMED ON: ABNORMAL
PH UA: 5.5 (ref 5–8)
PLATELET # BLD: 201 K/UL (ref 135–450)
PMV BLD AUTO: 8.9 FL (ref 5–10.5)
POTASSIUM SERPL-SCNC: 2.9 MMOL/L (ref 3.5–5.1)
PROTEIN UA: NEGATIVE MG/DL
RBC # BLD: 3.56 M/UL (ref 4–5.2)
SODIUM BLD-SCNC: 133 MMOL/L (ref 136–145)
SPECIFIC GRAVITY UA: >=1.03 (ref 1–1.03)
URINE REFLEX TO CULTURE: ABNORMAL
URINE TYPE: ABNORMAL
UROBILINOGEN, URINE: 0.2 E.U./DL
WBC # BLD: 6.7 K/UL (ref 4–11)

## 2022-10-28 PROCEDURE — 92610 EVALUATE SWALLOWING FUNCTION: CPT

## 2022-10-28 PROCEDURE — 92526 ORAL FUNCTION THERAPY: CPT

## 2022-10-28 PROCEDURE — 97535 SELF CARE MNGMENT TRAINING: CPT

## 2022-10-28 PROCEDURE — 97162 PT EVAL MOD COMPLEX 30 MIN: CPT

## 2022-10-28 PROCEDURE — 97116 GAIT TRAINING THERAPY: CPT

## 2022-10-28 PROCEDURE — 97530 THERAPEUTIC ACTIVITIES: CPT

## 2022-10-28 PROCEDURE — 6360000002 HC RX W HCPCS: Performed by: INTERNAL MEDICINE

## 2022-10-28 PROCEDURE — 83036 HEMOGLOBIN GLYCOSYLATED A1C: CPT

## 2022-10-28 PROCEDURE — 1200000000 HC SEMI PRIVATE

## 2022-10-28 PROCEDURE — G0378 HOSPITAL OBSERVATION PER HR: HCPCS

## 2022-10-28 PROCEDURE — 36415 COLL VENOUS BLD VENIPUNCTURE: CPT

## 2022-10-28 PROCEDURE — 96372 THER/PROPH/DIAG INJ SC/IM: CPT

## 2022-10-28 PROCEDURE — 6370000000 HC RX 637 (ALT 250 FOR IP): Performed by: INTERNAL MEDICINE

## 2022-10-28 PROCEDURE — 80048 BASIC METABOLIC PNL TOTAL CA: CPT

## 2022-10-28 PROCEDURE — 97165 OT EVAL LOW COMPLEX 30 MIN: CPT

## 2022-10-28 PROCEDURE — 81003 URINALYSIS AUTO W/O SCOPE: CPT

## 2022-10-28 PROCEDURE — 85027 COMPLETE CBC AUTOMATED: CPT

## 2022-10-28 RX ORDER — INSULIN GLARGINE 100 [IU]/ML
20 INJECTION, SOLUTION SUBCUTANEOUS NIGHTLY
Status: DISCONTINUED | OUTPATIENT
Start: 2022-10-28 | End: 2022-10-29 | Stop reason: HOSPADM

## 2022-10-28 RX ORDER — LISINOPRIL 20 MG/1
20 TABLET ORAL DAILY
Status: DISCONTINUED | OUTPATIENT
Start: 2022-10-28 | End: 2022-10-29 | Stop reason: HOSPADM

## 2022-10-28 RX ORDER — TAMOXIFEN CITRATE 10 MG/1
20 TABLET ORAL DAILY
Status: DISCONTINUED | OUTPATIENT
Start: 2022-10-28 | End: 2022-10-29 | Stop reason: HOSPADM

## 2022-10-28 RX ORDER — POTASSIUM CHLORIDE 20 MEQ/1
20 TABLET, EXTENDED RELEASE ORAL
Status: DISCONTINUED | OUTPATIENT
Start: 2022-10-28 | End: 2022-10-29 | Stop reason: HOSPADM

## 2022-10-28 RX ORDER — INSULIN LISPRO 100 [IU]/ML
0-4 INJECTION, SOLUTION INTRAVENOUS; SUBCUTANEOUS NIGHTLY
Status: DISCONTINUED | OUTPATIENT
Start: 2022-10-28 | End: 2022-10-29 | Stop reason: HOSPADM

## 2022-10-28 RX ORDER — LISINOPRIL AND HYDROCHLOROTHIAZIDE 25; 20 MG/1; MG/1
2 TABLET ORAL DAILY
Status: DISCONTINUED | OUTPATIENT
Start: 2022-10-28 | End: 2022-10-28 | Stop reason: CLARIF

## 2022-10-28 RX ORDER — LORAZEPAM 0.5 MG/1
0.5 TABLET ORAL EVERY 4 HOURS PRN
Status: DISCONTINUED | OUTPATIENT
Start: 2022-10-28 | End: 2022-10-29 | Stop reason: HOSPADM

## 2022-10-28 RX ORDER — DEXTROSE MONOHYDRATE 100 MG/ML
INJECTION, SOLUTION INTRAVENOUS CONTINUOUS PRN
Status: DISCONTINUED | OUTPATIENT
Start: 2022-10-28 | End: 2022-10-29 | Stop reason: HOSPADM

## 2022-10-28 RX ORDER — AMLODIPINE BESYLATE 5 MG/1
5 TABLET ORAL DAILY
Status: DISCONTINUED | OUTPATIENT
Start: 2022-10-28 | End: 2022-10-29 | Stop reason: HOSPADM

## 2022-10-28 RX ORDER — INSULIN LISPRO 100 [IU]/ML
0-8 INJECTION, SOLUTION INTRAVENOUS; SUBCUTANEOUS
Status: DISCONTINUED | OUTPATIENT
Start: 2022-10-28 | End: 2022-10-29 | Stop reason: HOSPADM

## 2022-10-28 RX ORDER — CIPROFLOXACIN 500 MG/1
500 TABLET, FILM COATED ORAL EVERY 12 HOURS SCHEDULED
Status: DISCONTINUED | OUTPATIENT
Start: 2022-10-28 | End: 2022-10-29 | Stop reason: HOSPADM

## 2022-10-28 RX ORDER — DONEPEZIL HYDROCHLORIDE 5 MG/1
5 TABLET, FILM COATED ORAL NIGHTLY
Status: DISCONTINUED | OUTPATIENT
Start: 2022-10-28 | End: 2022-10-29 | Stop reason: HOSPADM

## 2022-10-28 RX ORDER — INSULIN GLARGINE 100 [IU]/ML
12 INJECTION, SOLUTION SUBCUTANEOUS NIGHTLY
Status: DISCONTINUED | OUTPATIENT
Start: 2022-10-28 | End: 2022-10-28

## 2022-10-28 RX ORDER — HYDROCHLOROTHIAZIDE 25 MG/1
25 TABLET ORAL DAILY
Status: DISCONTINUED | OUTPATIENT
Start: 2022-10-28 | End: 2022-10-29 | Stop reason: HOSPADM

## 2022-10-28 RX ADMIN — DONEPEZIL HYDROCHLORIDE 5 MG: 5 TABLET, FILM COATED ORAL at 20:21

## 2022-10-28 RX ADMIN — AMLODIPINE BESYLATE 5 MG: 5 TABLET ORAL at 10:05

## 2022-10-28 RX ADMIN — TAMOXIFEN CITRATE 20 MG: 10 TABLET ORAL at 10:08

## 2022-10-28 RX ADMIN — CIPROFLOXACIN 500 MG: 500 TABLET, FILM COATED ORAL at 20:21

## 2022-10-28 RX ADMIN — INSULIN LISPRO 8 UNITS: 100 INJECTION, SOLUTION INTRAVENOUS; SUBCUTANEOUS at 12:41

## 2022-10-28 RX ADMIN — POTASSIUM CHLORIDE 20 MEQ: 1500 TABLET, EXTENDED RELEASE ORAL at 12:41

## 2022-10-28 RX ADMIN — HYDROCHLOROTHIAZIDE 25 MG: 25 TABLET ORAL at 10:05

## 2022-10-28 RX ADMIN — INSULIN GLARGINE 20 UNITS: 100 INJECTION, SOLUTION SUBCUTANEOUS at 22:27

## 2022-10-28 RX ADMIN — ENOXAPARIN SODIUM 30 MG: 100 INJECTION SUBCUTANEOUS at 10:05

## 2022-10-28 RX ADMIN — POTASSIUM CHLORIDE 20 MEQ: 1500 TABLET, EXTENDED RELEASE ORAL at 17:36

## 2022-10-28 RX ADMIN — LISINOPRIL 20 MG: 20 TABLET ORAL at 10:05

## 2022-10-28 RX ADMIN — LORAZEPAM 0.5 MG: 0.5 TABLET ORAL at 20:22

## 2022-10-28 ASSESSMENT — PAIN SCALES - GENERAL
PAINLEVEL_OUTOF10: 0

## 2022-10-28 ASSESSMENT — PAIN SCALES - WONG BAKER
WONGBAKER_NUMERICALRESPONSE: 0

## 2022-10-28 NOTE — PLAN OF CARE
Problem: Skin/Tissue Integrity  Goal: Absence of new skin breakdown  Description: 1. Monitor for areas of redness and/or skin breakdown  2. Assess vascular access sites hourly  3. Every 4-6 hours minimum:  Change oxygen saturation probe site  4. Every 4-6 hours:  If on nasal continuous positive airway pressure, respiratory therapy assess nares and determine need for appliance change or resting period. 10/28/2022 1101 by Teagan Murray RN  Outcome: Progressing  10/28/2022 0124 by Shaye Forte RN  Outcome: Progressing     Problem: Safety - Adult  Goal: Free from fall injury  10/28/2022 1101 by Teagan Murray RN  Outcome: Progressing  10/28/2022 0124 by Shaye Forte RN  Outcome: Progressing     Problem: Pain  Goal: Verbalizes/displays adequate comfort level or baseline comfort level  10/28/2022 1101 by Teagan Murray RN  Outcome: Progressing  Flowsheets (Taken 10/28/2022 0956)  Verbalizes/displays adequate comfort level or baseline comfort level: Encourage patient to monitor pain and request assistance  10/28/2022 0124 by Shaye Forte RN  Outcome: Progressing     Pt. Resting in chair at this time. VSS. Pt. Confused as to why she is here. Doesn't remember anything about coming in. Pt. Ate some breakfast. Took all meds well. Gerson Edgar is near now in the room. Will monitor. Call light in reach.

## 2022-10-28 NOTE — PROGRESS NOTES
[facility] Department   Phone: [phone]    Occupational Therapy    [x] Initial Evaluation            [] Daily Treatment Note         [] Discharge Summary      Patient: Donovan Pascual   : 1945   MRN: [id number]   Date of Service:  10/28/2022    Admitting Diagnosis:  Altered mental status  Current Admission Summary: This is a 76 y.o. female with pertinent past medical history of Alzheimer's dementia, Ménière's disease, diabetes, hypertension, high cholesterol who was brought in by EMS transportation for fall. Patient states that she was feeling dizzy and then fell into the wall of the corner of her room. Believes she struck the back of her head however is unsure. She does not believe she lost consciousness. She is uncertain how long she was on the ground, however believes it was less than an hour. She reports aching pain to her left scalp. Nothing seems to make the symptoms any better or worse. Patient is present with friend at bedside, states the patient has seemed more confused than normal over the past few days. She was diagnosed with a urinary tract infection on 10/25/2022 and started on antibiotics. Past Medical History:  has a past medical history of Arthritis, Cancer ([address] Cataract, Crohn disease ([address] Dementia ([address] Dental bridge present, Dental crowns present, Diabetes mellitus ([address] Encounter for loop recorder at end of battery life, Goiter, Hearing impairment, Herniated lumbar intervertebral disc, History of pancreatitis, Hypercholesteremia, Hypertension, IBS (irritable bowel syndrome), LBBB (left bundle branch block), Osteoarthritis of foot, Recurrent UTI, Right-sided low back pain with right-sided sciatica, Rosacea, Spinal stenosis, Tibialis posterior tendinitis, and Type II or unspecified type diabetes mellitus without mention of complication, not stated as uncontrolled.   Past Surgical History:  has a past surgical history that includes Carpal tunnel release; Hysterectomy; Insertable Cardiac Monitor; eye surgery (Bilateral); Breast lumpectomy (11 6 12); Breast biopsy (10/5/12); Colonoscopy; Cardiac surgery (2010); Axillary Surgery (Left); and Lumbar spine surgery (04/14/2016). Discharge Recommendations: Vicky Marroquin scored a 17/24 on the AM-PAC ADL Inpatient form. Current research shows that an AM-PAC score of 17 or less is typically not associated with a discharge to the patient's home setting. Based on the patient's AM-PAC score and their current ADL deficits, it is recommended that the patient have 3-5 sessions per week of Occupational Therapy at d/c to increase the patient's independence. Please see assessment section for further patient specific details. If patient discharges prior to next session this note will serve as a discharge summary. Please see below for the latest assessment towards goals. DME Required For Discharge: DME to be determined at next level of care, rolling walker    Precautions/Restrictions: high fall risk, up as tolerated, cognition    Pre-Admission Information   Lives With:  facility, [facility]                     Type of Home: long term care facility  Home Layout: one level  Home Access: level entry  [facility]: walker accessible  Home Equipment: rolling walker  Transfer Assistance: Independent without use of device  Ambulation Assistance:Independent without use of device  ADL Assistance: supervision  IADL Assistance: requires assistance for all  Active :        [] Yes                 [x] No  Hand Dominance: [] Left                 [x] Right  Current Employment:  retired  Hobbies:  None verbalized. Recent Falls: Patient reports multiple falls secondary to Meniere's. **Patient is A&O x1 to self and is a questionable historian.     Examination   Vision:   Vision Corrective Device: wears glasses at all times and Visual History: other - Ménière's disease  Hearing:   hard of hearing, left hearing aid, right hearing aid  Perception:   Unilateral Attention: cues to attend (R) visual field  Observation:   General Observation:  no socks, no swelling noted, donned socks  Posture: Forward head, rounded shoulders   Sensation:   WFL  ROM:   (B) UE AROM WFL  Strength:   (B) UE strength grossly WFL  Decision Making: low complexity  Clinical Presentation: stable      Subjective  General: pt seated on EOB with PT. Pt agreeable to OT/PT co-tx and evaluation. Pain: Patient does not rate upon questioning  Pain Interventions: not applicable        Activities of Daily Living  Basic Activities of Daily Living  Grooming: stand by assistance  Grooming Comments: oral care completed in stance, used hair cap to wash hair, pt able to comb hair with set-up assistance while seated  Lower Extremity Dressing: maximum assistance  Dressing Comments: seated on EOB, pt refusing to don socks, required A to don socks d/t hospital protocol to reduce risk of falls  General Comments: pt is pleasantly confused and requires frequent re-direction to hospital and ADL task, 1x occurrence of pt \"fearful\" of therapist where she refused hair cap, pt re-orientated and then agreeable to hair care. Instrumental Activities of Daily Living  No IADL completed on this date. Functional Mobility  Bed Mobility  Bed mobility not completed on this date. Transfers  Sit to stand transfer:contact guard assistance  Stand to sit transfer: contact guard assistance  Comments:1 sit to stand from EOB, 1 sit to stand from recliner, poor hand placement, poor safety awareness, mod to max verbal cues  Functional Mobility:  Sitting Balance: supervision. Sitting Balance Comment: poor safety awareness  Standing Balance: stand by assistance.     Standing Balance Comment: in stance at sink with one hand supporting self on countertop   Functional Mobility: .  contact guard assistance  Functional Mobility Activity: 300 ft  Functional Mobility Device Use: rolling walker  Functional Mobility Comment: pt inconsistently  placing R hand on hand rail during ambulation, cues to correct  Functional Outcomes  AM-PAC Inpatient Daily Activity Raw Score: 17    Cognition  Overall Cognitive Status: Impaired  Following Commands: follows one step commands with repetition  Attention Span: attends with cues to redirect  Memory: decreased recall of biographical information, decreased recall of precautions, decreased recall of recent events, decreased short term memory, decreased long term memory  Safety Judgement: decreased awareness of need for assistance, decreased awareness of need for safety  Problem Solving: assistance required to generate solutions, assistance required to implement solutions, decreased awareness of errors, assistance required to identify errors made, assistance required to correct errors made  Insights: decreased awareness of deficits  Initiation: does not require cues  Sequencing: does not require cues  Comments: decreased safety awareness  Orientation:    oriented to person, disoriented to place, disoriented to time , and disoriented to situation  Command Following:   impaired   One step commands with repetition  Education  Barriers To Learning: cognition  Patient Education: patient educated on precautions, ADL adaptive strategies, proper use of assistive device/equipment, transfer training  Learning Assessment:  patient will require reinforcement due to cognitive deficits    Assessment  Activity Tolerance: Good  Impairments Requiring Therapeutic Intervention: decreased functional mobility, decreased ADL status, decreased safety awareness, decreased cognition, decreased balance, decreased coordination, decreased posture  Prognosis: fair  Clinical Assessment: Pt is a 76 y.o. F with a history of Ménière's disease, alzheimer's, and dementia who presents with altered mental status secondary to a UTI.  Pt requires CGA for functional transfers and mobility and max A for LB dressing.  Patient to continue acute OT services to maximize safety and independence with task performance in order to reach maximum functional potential.    Safety Interventions: patient left in chair, chair alarm in place, call light within reach, patient at risk for falls, and telesitter in use    Plan  Frequency: 3-5 x/per week  Current Treatment Recommendations: strengthening, balance training, functional mobility training, transfer training, patient/caregiver education, ADL/self-care training, IADL training, home exercise program, and safety education    Goals  Patient Goals: none stated   Short Term Goals:  Time Frame: upon d/c  Patient will complete upper body ADL at supervision   Patient will complete lower body ADL at stand by assistance   Patient will complete functional transfers at supervision   Patient will complete functional mobility at supervision   Patient will increase [facility] ADL score = to or > than 20/24    Therapy Session Time     Individual Group Co-treatment   Time In    0912   Time Out    0950   Minutes    38        Timed Code Treatment Minutes:  Timed Code Treatment Minutes: 23 Minutes    Total Treatment Minutes:  38       Electronically Signed By: ROLAND Kahn OTR/L, [id number]

## 2022-10-28 NOTE — CARE COORDINATION
Discharge Planning:     (DEBI) reviewed patient's chart which states that patient is from [facility]. CM called [facility] admissions staff, Evelin Spicer ([phone]), and left a voicemail requesting a callback to confirm patient residency and ability for patient to return upon discharge. CM provided callback information. [facility] -   [phone]    Electronically signed by MAGNOLIA Paz on 10/28/2022 at 8:16 AM        Update at [phone]:  CM received a callback from Evelin Spicer who reported that [place] [address] [place] at [facility]. Evelin Spicer reported that the patient can return skilled, if needed.       [facility] -   [phone]    Electronically signed by MAGNOLIA Paz on 10/28/2022 at 10:54 AM

## 2022-10-28 NOTE — PROGRESS NOTES
4 Eyes Skin Assessment     The patient is being assess for  Admission    I agree that 2 RN's have performed a thorough Head to Toe Skin Assessment on the patient. ALL assessment sites listed below have been assessed. Areas assessed by both nurses:   [x]   Head, Face, and Ears   [x]   Shoulders, Back, and Chest  [x]   Arms, Elbows, and Hands   [x]   Coccyx, Sacrum, and IschIum  [x]   Legs, Feet, and Heels        Does the Patient have Skin Breakdown?   No         Pedro Prevention initiated:  Yes   Wound Care Orders initiated:  No      [facility] nurse consulted for Pressure Injury (Stage 3,4, Unstageable, DTI, NWPT, and Complex wounds), New and Established Ostomies:  No      Nurse 1 eSignature: Electronically signed by Giovanny Otto RN on 10/28/22 at 3:13 AM EDT    **SHARE this note so that the co-signing nurse is able to place an eSignature**    Nurse 2 eSignature: Electronically signed by Talitha Carrel, RN on 10/28/22 at 3:24 AM EDT

## 2022-10-28 NOTE — PROGRESS NOTES
Patient admitted to 65. Patient is alert to self. Admission completed with patient family. Vital signs WNL, respirations easy and unlabored. Patient has bruising on her upper back.  Bed alarm engaged, call light within reached, Patient oriented to  her room, will continue to monitor

## 2022-10-28 NOTE — PROGRESS NOTES
Patient pulled two IV's. Patient confused at this time, agustin care completed. Bed pad and diaper changed, New IV placed in the right hand. Bed alarm engaged, video monitoring in place.  Will continue to monitor

## 2022-10-28 NOTE — PROGRESS NOTES
Facility/Department: [address] ONCOLOGY  Initial Assessment  DYSPHAGIA BEDSIDE SWALLOW EVALUATION     Patient: Shaheen Jenkins   : 1945   MRN: [id number]      Evaluation Date: 10/28/2022   Admitting Diagnosis: Altered mental status [R41.82]  Elevated troponin [R77.8]  Acute kidney injury ([facility] [address] [N17.9]  Acute cystitis without hematuria [N30.00]  Acute encephalopathy [G93.40]  Frequent falls [R29.6]  Pain: Reported pain in neck, RN notified                                                       H&P: This is a 76 y.o. female with pertinent past medical history of Alzheimer's dementia, Ménière's disease, diabetes, hypertension, high cholesterol who was brought in by EMS transportation for fall. Patient states that she was feeling dizzy and then fell into the wall of the corner of her room. Believes she struck the back of her head however is unsure. She does not believe she lost consciousness. She is uncertain how long she was on the ground, however believes it was less than an hour. She reports aching pain to her left scalp. Nothing seems to make the symptoms any better or worse. Patient is present with friend at bedside, states the patient has seemed more confused than normal over the past few days. She was diagnosed with a urinary tract infection on 10/25/2022 and started on antibiotics. Imaging:  Head CT:  10/27/22  Impression   No acute intracranial abnormality. Redemonstration of a CSF filled area in   the left temporal and frontal regions with mild mass effect and midline shift   from left to right also previously seen. 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  Age-appropriate atrophy. History/Prior Level of Function:   Living Status: Pt resides at [facility]. Prior Dysphagia History: Per chart review, no prior history of dysphagia. Pt friend reported pt consumes a regular texture/thin liquid diet at baseline.   Reason for referral: SLP evaluation orders received due to altered mental status    Dysphagia Impressions/Diagnosis: Oropharyngeal Dysphagia   Pt alert and upright for evaluation with friend present during evaluation. Pt denied onset of dysphagia and pt friend reported cognition is baseline at this time. Oral motor exam revealed no overt asymmetry with functional strength, ROM and coordination. Various textures provided to assess swallow function. Pt presents with a mild oropharyngeal dysphagia characterized by prolonged mastication, suspected premature bolus loss to pharynx, a mildly delayed swallow initiation and decreased laryngeal elevation upon manual palpation. Pt able to achieve good oral clearance of regular and soft solids. No overt clinical s/s of aspiration noted with thin liquids. Overall, pt is at increased risk of aspiration secondary to cognitive status but appears to be tolerating current diet recommendations. Therefore, recommend continuation of a regular texture diet with thin liquids, meds whole with water. Recommended Diet and Intervention 10/28/2022:  Diet Solids Recommendation:  Regular texture diet  Liquid Consistency Recommendation: Thin liquids  Recommended form of Meds: Meds whole with water          Compensatory Swallowing Strategies: Alternate solids/liquids , Upright as possible with all PO intake , Small bites/sips , Eat/feed slowly, Remain upright 30-45 min     SHORT TERM DYSPHAGIA GOALS/PLAN OF CARE: Speech therapy for dysphagia tx 3-5 times per week during acute care stay.     Pt will functionally tolerate recommended diet with no overt clinical s/s of aspiration   Pt will demonstrate understanding of aspiration risk and precautions via education/demonstration with occasional prompting    Dysphagia Therapeutic Intervention:  Diet Tolerance Monitoring , Patient/Family Education     Discharge Recommendations: Discharge recommendations to be determined pending ongoing follow-up during acute care stay    Patient Positioning: Upright in chair    Current Diet Level (prior to evaluation): Regular texture diet  Thin liquids      Respiratory Status:   [x]Room Air   []O2 via nasal cannula   []Other:    Dentition:  []Adequate  [x]Dentures   []Missing Many Teeth  []Edentulous  []Other:    Baseline Vocal Quality:  [x]Normal  []Dysphonic   []Aphonic   []Hoarse  []Wet  []Weak  []Other:    Volitional Cough:  Not Elicited     Volitional Swallow:   []Absent   []Delayed     [x]Adequate     []Required use of drink     Oral Mechanism Exam:  [x]WFL []Mild   [] Moderate  []Severe  []To be assessed  Impaired:   []Left side      []Right side    []Labial ROM/Coordination    []Labial Symmetry   []Lingual ROM/Coordination   []Lingual Symmetry  []Gag  []Other:     Oral Phase: []WFL [x]Mild   [] Moderate  []Severe  []To be assessed   [x]Impaired/Prolonged Mastication:   []Oral Holding:   []Spillage Left:   []Spillage Right:  []Pocketing Left:   []Pocketing Right:   []Decreased Anterior to Posterior Transit:   [x]Suspected Premature Bolus Loss:   []Lingual/Palatal Residue:   []Other:     Pharyngeal Phase: []WFL [x]Mild   [] Moderate  []Severe  []To be assessed   [x]Delayed Swallow:   []Suspected Pharyngeal Pooling:   [x]Decreased Laryngeal Elevation:   []Absent Swallow:  []Wet Vocal Quality:   []Throat Clearing-Immediate:   []Throat Clearing-Delayed:   []Cough-Immediate:   []Cough-Delayed:  []Change in Vital Signs:  []Suspected Delayed Pharyngeal Clearing:  []Other:     Eating Assistance:  [x]Independent  []Setup or clean-up assistance   [] Supervision or touching assistance   [] Partial or moderate assistance   [] Substantial or maximal assistance  [] Dependent     EDUCATION:   Provided education regarding role of SLP, results of assessment, recommendations and general speech pathology plan of care.    [x] Pt verbalized understanding and agreement   [x] Pt requires ongoing learning   [] No evidence of comprehension     If patient discharges prior to next visit, this note will serve as discharge.      Treatment time:  Timed Code Treatment Minutes: 0  Total Treatment time: 25 min    Electronically signed by:    Mariaa Teran M.A., [address] [place] [address]  Speech-Language Pathologist

## 2022-10-28 NOTE — PROGRESS NOTES
Nutrition Note    RECOMMENDATIONS  PO Diet: regular, add Carb Control modifer  ONS: glucerna BID    NUTRITION ASSESSMENT   Nutrition eval triggered based on admission MST of 2, indicating wt loss and poor po intake. Pt on a regular diet and initially declined breakfast; however when friend came into visit she was able to get pt to eat 50 - 75% of tray. Pt with hx of DM and glucose was 310 this am, rec addition of carb control modifier. Pt with dementia and was not able to answer RD's questions regarding usual po intake or wt status. Friend stated intake is usually good, but recently poor d/t UTI. Will begin oral nutrition supplement. CBW is 150 lb, wt hx show pt was 161 lb n 9/7 and 178 lb back in July of 2021. Will monitor wt status while inpt. Nutrition Related Findings: oriented to person; trace BLE edema; Na+ 133, K+ 2.9, glucose 310  Wounds: None  Nutrition Education:  Education not indicated   Nutrition Goals: PO intake 50% or greater     MALNUTRITION ASSESSMENT   Acute Illness  Malnutrition Status: At risk for malnutrition (Comment)  Findings of the 6 clinical characteristics of malnutrition:  Energy Intake:  75% or less of estimated energy requirements for 7 or more days  Weight Loss:  Greater than 5% over 1 month (7%)     Body Fat Loss:  No significant body fat loss     Muscle Mass Loss:  No significant muscle mass loss    Fluid Accumulation:  No significant fluid accumulation     Strength:  Not Performed      NUTRITION DIAGNOSIS   Inadequate oral intake related to inadequate protein-energy intake as evidenced by poor intake prior to admission, weight loss      CURRENT NUTRITION THERAPIES  ADULT DIET;  Regular     PO Intake: 51-75%   PO Supplement Intake:None Ordered      ANTHROPOMETRICS  Current Height: 5' 6\" (167.6 cm)  Current Weight: 150 lb (68 kg)    Admission weight:    Ideal Body Weight (IBW): 130 lbs  (59 kg)    Usual Bodyweight 161 lb (73 kg)       BMI: 24.2      The patient will be monitored per nutrition standards of care. Consult dietitian if additional nutrition interventions are needed prior to RD reassessment.      José Manuel Frankel RD, LD    Contact: [phone]

## 2022-10-28 NOTE — PROGRESS NOTES
[facility]   Phone: [phone]    Physical Therapy    [x] Initial Evaluation            [] Daily Treatment Note         [] Discharge Summary      Patient: Yuni Watkins   : 1945   MRN: [id number]   Date of Service:  10/28/2022  Admitting Diagnosis: Altered mental status    Current Admission Summary: This is a 76 y.o. female with pertinent past medical history of Alzheimer's dementia, Ménière's disease, diabetes, hypertension, high cholesterol who was brought in by EMS transportation for fall. Patient states that she was feeling dizzy and then fell into the wall of the corner of her room. Believes she struck the back of her head however is unsure. She does not believe she lost consciousness. She is uncertain how long she was on the ground, however believes it was less than an hour. She reports aching pain to her left scalp. Nothing seems to make the symptoms any better or worse. Past Medical History:  has a past medical history of Arthritis, Cancer ([address] Cataract, Crohn disease ([address] Dementia ([address] Dental bridge present, Dental crowns present, Diabetes mellitus ([address] Encounter for loop recorder at end of battery life, Goiter, Hearing impairment, Herniated lumbar intervertebral disc, History of pancreatitis, Hypercholesteremia, Hypertension, IBS (irritable bowel syndrome), LBBB (left bundle branch block), Osteoarthritis of foot, Recurrent UTI, Right-sided low back pain with right-sided sciatica, Rosacea, Spinal stenosis, Tibialis posterior tendinitis, and Type II or unspecified type diabetes mellitus without mention of complication, not stated as uncontrolled. Past Surgical History:  has a past surgical history that includes Carpal tunnel release; Hysterectomy; Insertable Cardiac Monitor; eye surgery (Bilateral); Breast lumpectomy (12); Breast biopsy (10/5/12); Colonoscopy; Cardiac surgery ();  Axillary Surgery (Left); and Lumbar spine surgery (04/14/2016). Discharge Recommendations: Brittany Nicely scored a 17/24 on the AM-PAC short mobility form. Current research shows that an AM-PAC score of 17 or less is typically not associated with a discharge to the patient's home setting. Based on the patient's AM-PAC score and their current functional mobility deficits, it is recommended that the patient have 3-5 sessions per week of Physical Therapy at d/c to increase the patient's independence. Please see assessment section for further patient specific details. If patient discharges prior to next session this note will serve as a discharge summary. Please see below for the latest assessment towards goals. DME Required For Discharge: rolling walker  Precautions/Restrictions: high fall risk  Weight Bearing Restrictions: weight bearing as tolerated  [] Right Upper Extremity  [] Left Upper Extremity [] Right Lower Extremity  [] Left Lower Extremity     Required Braces/Orthotics: no braces required   [] Right  [] Left  Positional Restrictions:no positional restrictions    Pre-Admission Information   Lives With:  facility, [facility]     Type of Home: long term care facility  Home Layout: one level  Home Access: level entry  Bathroom Layout: walker accessible  Home Equipment: rolling walker  Transfer Assistance: Independent without use of device  Ambulation Assistance:Independent without use of device  ADL Assistance: supervision  IADL Assistance: requires assistance for all  Active :        [] Yes  [x] No  Hand Dominance: [] Left  [x] Right  Current Employment:  retired  Hobbies:  None verbalized. Recent Falls: Patient reports multiple falls secondary to Meniere's. **Patient is A&O x1 to self and is a questionable historian.     Examination   Vision:   Vision Gross Assessment: Impaired and Vision Corrective Device: wears glasses at all times  Hearing:   hard of hearing, left hearing aid, right hearing aid  Observation:   General Observation:  Patient supine in bed, attempting to exit bed w/ RN present, telesitter in use. Posture:   WFL, mild thoracic kyphosis. Sensation:   WFL  Proprioception:    WFL  Tone:   Normotonic  Coordination Testing:   WFL    ROM:   (B) LE AROM WFL  Strength:   (B) LE strength grossly WFL  Decision Making: medium complexity  Clinical Presentation: evolving      Subjective  General: Patient questioning where she is and how she got here. Had to be reminded repeatedly due to impaired short term memory. Pain: 0/10  Pain Interventions: not applicable       Functional Mobility  Bed Mobility  Supine to Sit: contact guard assistance  Comments:  Transfers  Sit to stand transfer: contact guard assistance  Stand to sit transfer: contact guard assistance  Stand pivot transfer: contact guard assistance  Bed to chair transfer: contact guard assistance  Comments:  Ambulation  Surface:level surface  Assistive Device: rolling walker  Assistance: contact guard assistance  Distance: 300'  Gait Mechanics: dec'd sanya, reciprocal, generally steady, no LOB this session  Comments:    Stair Mobility  Stair mobility not completed on this date. Comments:  Patient does not have stairs at home. Wheelchair Mobility:  No w/c mobility completed on this date.   Comments:  Balance  Static Sitting Balance: good: independent with functional balance in unsupported position  Dynamic Sitting Balance: good: independent with functional balance in unsupported position  Static Standing Balance: fair (-): maintains balance at CGA with use of UE support  Dynamic Standing Balance: fair (-): maintains balance at CGA with use of UE support  Comments:      Functional Outcomes  AM-PAC Inpatient Mobility Raw Score : 17              Cognition  Overall Cognitive Status: Impaired  Arousal/Alterness: appropriate responses to stimuli  Following Commands: follows one step commands with repetition, follows one step commands with increased time, follows one step commands consistently  Memory: decreased recall of biographical information, decreased recall of recent events, decreased short term memory, decreased long term memory  Safety Judgement: decreased awareness of need for assistance, decreased awareness of need for safety  Problem Solving: assistance required to generate solutions, assistance required to implement solutions, decreased awareness of errors, assistance required to identify errors made, assistance required to correct errors made  Insights: not aware of deficits  Orientation:    oriented to person, disoriented to place, disoriented to time , and disoriented to situation  Command Following:   impaired    Education  Barriers To Learning: cognition, hearing, and physical  Patient Education: patient educated on goals, PT role and benefits, plan of care, general safety, functional mobility training, proper use of assistive device/equipment, orientation, transfer training, discharge recommendations  Learning Assessment:  patient will require reinforcement due to cognitive deficits    Assessment  Activity Tolerance: No evident limitations. Impairments Requiring Therapeutic Intervention: decreased functional mobility, decreased safety awareness, decreased cognition, decreased balance  Prognosis: fair  Clinical Assessment: Patient reports being functionally independent at home w/ RW but she is a questionable historian and oriented only to self. She does demonstrate safe ambulation w/ RW today but also mentions she has Meniere's and gets dizzy and falls frequently. Therefore, CGA was maintained during session today. Patient appears to be near functional baseline. Will follow during acute stay. Anticipate d/c back to [facility], continue therapy if patient not at functional baseline.   Safety Interventions: patient left in chair, chair alarm in place, call light within reach, gait belt, patient at risk for falls, telesitter in use, and nurse notified    Plan  Frequency: 3-5 x/per week  Current Treatment Recommendations: balance training, functional mobility training, transfer training, gait training, cognitive reorientation, safety education, and equipment evaluation/education    Goals  Patient Goals: None verbalized. Short Term Goals:  Time Frame: Discharge.   Patient will complete bed mobility at [facility] independent   Patient will complete transfers at supervision   Patient will ambulate 300 ft with use of rolling walker at supervision    Therapy Session Time      Individual Group Co-treatment   Time In 0855   0912   Time Out 0912   0950   Minutes 17   38     Timed Code Treatment Minutes:  40 Minutes  Total Treatment Minutes:  55       Electronically Signed By: Quincy Crain, DPT, [id number] [id number]

## 2022-10-29 VITALS
HEIGHT: 66 IN | BODY MASS INDEX: 24.11 KG/M2 | WEIGHT: 150 LBS | OXYGEN SATURATION: 98 % | HEART RATE: 70 BPM | TEMPERATURE: 97.3 F | SYSTOLIC BLOOD PRESSURE: 122 MMHG | DIASTOLIC BLOOD PRESSURE: 69 MMHG | RESPIRATION RATE: 18 BRPM

## 2022-10-29 LAB
ANION GAP SERPL CALCULATED.3IONS-SCNC: 12 MMOL/L (ref 3–16)
BUN BLDV-MCNC: 26 MG/DL (ref 7–20)
CALCIUM SERPL-MCNC: 8.9 MG/DL (ref 8.3–10.6)
CHLORIDE BLD-SCNC: 99 MMOL/L (ref 99–110)
CO2: 22 MMOL/L (ref 21–32)
CREAT SERPL-MCNC: 1.1 MG/DL (ref 0.6–1.2)
GFR SERPL CREATININE-BSD FRML MDRD: 51 ML/MIN/{1.73_M2}
GLUCOSE BLD-MCNC: 138 MG/DL (ref 70–99)
GLUCOSE BLD-MCNC: 165 MG/DL (ref 70–99)
GLUCOSE BLD-MCNC: 342 MG/DL (ref 70–99)
GLUCOSE BLD-MCNC: 402 MG/DL (ref 70–99)
MAGNESIUM: 1.8 MG/DL (ref 1.8–2.4)
PERFORMED ON: ABNORMAL
POTASSIUM SERPL-SCNC: 3.8 MMOL/L (ref 3.5–5.1)
SODIUM BLD-SCNC: 133 MMOL/L (ref 136–145)

## 2022-10-29 PROCEDURE — 6370000000 HC RX 637 (ALT 250 FOR IP): Performed by: INTERNAL MEDICINE

## 2022-10-29 PROCEDURE — 80048 BASIC METABOLIC PNL TOTAL CA: CPT

## 2022-10-29 PROCEDURE — 6360000002 HC RX W HCPCS: Performed by: INTERNAL MEDICINE

## 2022-10-29 PROCEDURE — 83735 ASSAY OF MAGNESIUM: CPT

## 2022-10-29 PROCEDURE — 96372 THER/PROPH/DIAG INJ SC/IM: CPT

## 2022-10-29 PROCEDURE — G0378 HOSPITAL OBSERVATION PER HR: HCPCS

## 2022-10-29 RX ORDER — INSULIN LISPRO 100 [IU]/ML
0-4 INJECTION, SOLUTION INTRAVENOUS; SUBCUTANEOUS NIGHTLY
Qty: 1 EACH | Refills: 0 | Status: SHIPPED | OUTPATIENT
Start: 2022-10-29 | End: 2022-11-15 | Stop reason: ALTCHOICE

## 2022-10-29 RX ORDER — POTASSIUM CHLORIDE 20 MEQ/1
20 TABLET, EXTENDED RELEASE ORAL
Qty: 30 TABLET | Refills: 0 | Status: SHIPPED | OUTPATIENT
Start: 2022-10-29 | End: 2022-11-08

## 2022-10-29 RX ORDER — CIPROFLOXACIN 500 MG/1
500 TABLET, FILM COATED ORAL EVERY 12 HOURS SCHEDULED
Qty: 20 TABLET | Refills: 0 | Status: SHIPPED | OUTPATIENT
Start: 2022-10-29 | End: 2022-11-08

## 2022-10-29 RX ORDER — INSULIN LISPRO 100 [IU]/ML
2-8 INJECTION, SOLUTION INTRAVENOUS; SUBCUTANEOUS
Qty: 1 EACH | Refills: 3 | Status: SHIPPED | OUTPATIENT
Start: 2022-10-29 | End: 2022-11-15 | Stop reason: ALTCHOICE

## 2022-10-29 RX ORDER — INSULIN GLARGINE 100 [IU]/ML
20 INJECTION, SOLUTION SUBCUTANEOUS NIGHTLY
Qty: 10 ML | Refills: 3 | Status: SHIPPED | OUTPATIENT
Start: 2022-10-29 | End: 2022-11-16 | Stop reason: SDUPTHER

## 2022-10-29 RX ORDER — LORAZEPAM 0.5 MG/1
0.5 TABLET ORAL EVERY 4 HOURS PRN
Qty: 20 TABLET | Refills: 0 | Status: SHIPPED | OUTPATIENT
Start: 2022-10-29 | End: 2022-11-03

## 2022-10-29 RX ADMIN — LISINOPRIL 20 MG: 20 TABLET ORAL at 09:11

## 2022-10-29 RX ADMIN — POTASSIUM CHLORIDE 20 MEQ: 1500 TABLET, EXTENDED RELEASE ORAL at 12:14

## 2022-10-29 RX ADMIN — HYDROCHLOROTHIAZIDE 25 MG: 25 TABLET ORAL at 09:11

## 2022-10-29 RX ADMIN — POTASSIUM CHLORIDE 20 MEQ: 1500 TABLET, EXTENDED RELEASE ORAL at 09:11

## 2022-10-29 RX ADMIN — INSULIN LISPRO 6 UNITS: 100 INJECTION, SOLUTION INTRAVENOUS; SUBCUTANEOUS at 12:14

## 2022-10-29 RX ADMIN — TAMOXIFEN CITRATE 20 MG: 10 TABLET ORAL at 09:13

## 2022-10-29 RX ADMIN — ENOXAPARIN SODIUM 30 MG: 100 INJECTION SUBCUTANEOUS at 09:11

## 2022-10-29 RX ADMIN — CIPROFLOXACIN 500 MG: 500 TABLET, FILM COATED ORAL at 09:11

## 2022-10-29 RX ADMIN — AMLODIPINE BESYLATE 5 MG: 5 TABLET ORAL at 09:11

## 2022-10-29 ASSESSMENT — PAIN SCALES - WONG BAKER
WONGBAKER_NUMERICALRESPONSE: 0

## 2022-10-29 ASSESSMENT — PAIN SCALES - GENERAL
PAINLEVEL_OUTOF10: 0
PAINLEVEL_OUTOF10: 0

## 2022-10-29 NOTE — CARE COORDINATION
Discharge Plan:      Patient discharged to: [facility]  PHONE: [phone]  FAX: [phone]  SW/DC Planner faxed, [address] and [address]   Narcotic Prescriptions faxed were: not applicable  RN: Rossi will call report      Medical Transport with: [address] up time: 4:15-4:30 PM  Family advised of discharge?: SW spoke with patient's son  Kati Sessions?:  N/A  All discharge needs met per case management.     MAGNOLIA Almaraz, [address]  [facility]  [phone]

## 2022-10-29 NOTE — PLAN OF CARE
Problem: Skin/Tissue Integrity  Goal: Absence of new skin breakdown  Description: 1. Monitor for areas of redness and/or skin breakdown  2. Assess vascular access sites hourly  3. Every 4-6 hours minimum:  Change oxygen saturation probe site  4. Every 4-6 hours:  If on nasal continuous positive airway pressure, respiratory therapy assess nares and determine need for appliance change or resting period.   Outcome: Progressing     Problem: Safety - Adult  Goal: Free from fall injury  Outcome: Progressing     Problem: Pain  Goal: Verbalizes/displays adequate comfort level or baseline comfort level  Outcome: Progressing  Flowsheets  Taken 10/28/2022 1519 by Alecia Grant RN  Verbalizes/displays adequate comfort level or baseline comfort level: Encourage patient to monitor pain and request assistance  Taken 10/28/2022 1230 by Alecia Grant RN  Verbalizes/displays adequate comfort level or baseline comfort level: Encourage patient to monitor pain and request assistance     Problem: Chronic Conditions and Co-morbidities  Goal: Patient's chronic conditions and co-morbidity symptoms are monitored and maintained or improved  Outcome: Progressing     Problem: Nutrition Deficit:  Goal: Optimize nutritional status  Outcome: Progressing

## 2022-10-29 NOTE — PROGRESS NOTES
Patient Active Problem List   Diagnosis    Diabetes mellitus ([facility] [address]    Personal history of breast cancer    Non-specific colitis    Left bundle branch block    Meniere's disease    Pure hypercholesterolemia    Spinal stenosis    Essential hypertension    Ductal carcinoma in situ of left breast    Encounter for monitoring tamoxifen therapy    Lumbar stenosis    Lumbar radiculopathy    S/P lumbar laminectomy    S/P discectomy for herniated nucleus pulposus    Goiter    Alzheimer's dementia (HCC)    Bilateral hearing loss    Altered mental status    UTI (urinary tract infection)    Hypokalemia    Uncontrolled type 2 diabetes mellitus with hyperglycemia (HCC)   H&P  dictated

## 2022-10-29 NOTE — PROGRESS NOTES
RAMIN picked patient up to take to [facility]. Pt. ALEXANDRA Gooden with her at this time. Belongings and folder sent with patient. Update at [facility] called and stated she hasn't arrived. Aide Landis worried as well. Got a hold of RAMIN and they delivered her to the SNF. Alerted [facility]isted Living of this.

## 2022-10-29 NOTE — PLAN OF CARE
Problem: Skin/Tissue Integrity  Goal: Absence of new skin breakdown  Description: 1. Monitor for areas of redness and/or skin breakdown  2. Assess vascular access sites hourly  3. Every 4-6 hours minimum:  Change oxygen saturation probe site  4. Every 4-6 hours:  If on nasal continuous positive airway pressure, respiratory therapy assess nares and determine need for appliance change or resting period.   10/29/2022 0955 by Nicola Figueroa RN  Outcome: Progressing  10/29/2022 0129 by Willis Cadena RN  Outcome: Progressing     Problem: Safety - Adult  Goal: Free from fall injury  10/29/2022 0955 by Nicola Figueroa RN  Outcome: Progressing  10/29/2022 0129 by Willis Cadena RN  Outcome: Progressing     Problem: Pain  Goal: Verbalizes/displays adequate comfort level or baseline comfort level  10/29/2022 0955 by Nicola Figueroa RN  Outcome: Progressing  Flowsheets (Taken 10/29/2022 9008)  Verbalizes/displays adequate comfort level or baseline comfort level: Encourage patient to monitor pain and request assistance  10/29/2022 0129 by Willis Cadena RN  Outcome: Progressing  Flowsheets  Taken 10/28/2022 1519 by Nicola Figueroa RN  Verbalizes/displays adequate comfort level or baseline comfort level: Encourage patient to monitor pain and request assistance  Taken 10/28/2022 1230 by Nicola Figueroa RN  Verbalizes/displays adequate comfort level or baseline comfort level: Encourage patient to monitor pain and request assistance     Problem: Chronic Conditions and Co-morbidities  Goal: Patient's chronic conditions and co-morbidity symptoms are monitored and maintained or improved  10/29/2022 0955 by Nicola Figueroa RN  Outcome: Progressing  Flowsheets (Taken 10/29/2022 9078)  Care Plan - Patient's Chronic Conditions and Co-Morbidity Symptoms are Monitored and Maintained or Improved: Monitor and assess patient's chronic conditions and comorbid symptoms for stability, deterioration, or improvement  10/29/2022 0129 by Georgina Luo RN  Outcome: Progressing     Problem: Nutrition Deficit:  Goal: Optimize nutritional status  10/29/2022 0955 by Juan Luis Doss RN  Outcome: Progressing  10/29/2022 0129 by Georgina Luo RN  Outcome: Progressing     Pt. Resting in chair at this time. VSS. Denies pain. More calm this AM. Took meds well. Got to bathroom well with 1 assist. Eating breakfast. Call light in reach. Will monitor.

## 2022-10-29 NOTE — DISCHARGE INSTR - COC
Continuity of Care Form    Patient Name: Nancy Cortes   :  1945  MRN:  [id number]    [address] [place] [address] date:  10/27/2022  Discharge date:  10/29/2022    Code Status Order: DNR-CC   Advance Directives:     Admitting Physician:  Luis Ragland MD  PCP: LUCIANA Pena - CNP    Discharging Nurse: [facility] Unit/Room#: 3KN-5970/7885-22  Discharging Unit Phone Number: [phone]    Emergency Contact:   Extended Emergency Contact Information  Primary Emergency Contact: Mj Vasquez  Address:             [address] Phone: [phone]  Relation: Child    Past Surgical History:  Past Surgical History:   Procedure Laterality Date    AXILLARY SURGERY Left     BREAST BIOPSY  10/5/12    BREAST LUMPECTOMY  12    with axillary nodedissection    CARDIAC SURGERY      coronary angiogram-no blockage    CARPAL TUNNEL RELEASE      bilateral    COLONOSCOPY      EYE SURGERY Bilateral     Cataracts    HYSTERECTOMY (CERVIX STATUS UNKNOWN)      INSERTABLE CARDIAC MONITOR      LUMBAR SPINE SURGERY  2016    BILATERAL DECOMPRESSIVE LUMBAR LAMINECTOMY L4-L5, RIGHT       Immunization History:   Immunization History   Administered Date(s) Administered    COVID-19, PFIZER GRAY top, DO NOT Dilute, (age 15 y+), IM, 30 mcg/0.3 mL 2022    COVID-19, PFIZER PURPLE top, DILUTE for use, (age 15 y+), 30mcg/0.3mL 2021, 2021, 10/05/2021    Influenza A (I3x7-33),all Formulations 2010    Influenza Virus Vaccine 10/04/2012, 10/17/2013, 10/15/2015, 2020    Influenza Whole 10/14/2002, 10/22/2004, 10/24/2005, 2006, 10/19/2007, 10/28/2008, 2009, 10/13/2011    Influenza, FLUZONE (age 72 y+), High Dose, 0.7mL 2021    Influenza, High Dose (Fluzone 65 yrs and older) 10/15/2010, 10/14/2014, 2016, 2017, 2018, 2019    Pneumococcal Conjugate 13-valent (Qpvgfqs69) 2015    Pneumococcal Polysaccharide (Cswtdezso10) 10/31/2006, 08/08/2016    Td (Adult), 5 Lf Tetanus Toxoid, Pf (Tenivac, Decavac) 10/24/2022    Tdap (Boostrix, Adacel) 07/05/2012    Zoster Live (Zostavax) 11/21/2013    Zoster Recombinant (Shingrix) 09/12/2018, 01/03/2019       Active Problems:  Patient Active Problem List   Diagnosis Code    Diabetes mellitus ([facility] E11.9    Personal history of breast cancer Z85.3    Non-specific colitis K52.9    Left bundle branch block I44.7    Meniere's disease H81.09    Pure hypercholesterolemia E78.00    Spinal stenosis M48.00    Essential hypertension I10    Ductal carcinoma in situ of left breast D05.12    Encounter for monitoring tamoxifen therapy Z51.81, Z79.810    Lumbar stenosis M48.061    Lumbar radiculopathy M54.16    S/P lumbar laminectomy Z98.890    S/P discectomy for herniated nucleus pulposus Z98.890, Z87.39    Goiter E04.9    Alzheimer's dementia (HCC) G30.9, F02.80    Bilateral hearing loss H91.93    Altered mental status R41.82    UTI (urinary tract infection) N39.0    Hypokalemia E87.6    Uncontrolled type 2 diabetes mellitus with hyperglycemia (HCC) E11.65       Isolation/Infection:   Isolation            No Isolation          Patient Infection Status       None to display            Nurse Assessment:  Last Vital Signs: /69   Pulse 70   Temp 97.3 °F (36.3 °C) (Oral)   Resp 18   Ht 5' 6\" (1.676 m)   Wt 150 lb (68 kg)   SpO2 98%   BMI 24.21 kg/m²     Last documented pain score (0-10 scale): Pain Level: 0  Last Weight:   Wt Readings from Last 1 Encounters:   10/27/22 150 lb (68 kg)     Mental Status:  disoriented and alert    IV Access:  - None    Nursing Mobility/ADLs:  Walking   Assisted  Transfer  Assisted  Bathing  Assisted  Dressing  Assisted  Toileting  Assisted  Feeding  Assisted  Med Admin  Assisted  Med Delivery   whole    Wound Care Documentation and Therapy:  Incision 04/14/16 Back Mid;Lower (Active)   Number of days: 2389        Elimination:  Continence:    Bowel: Yes  Bladder: Yes  Urinary Catheter: None   Colostomy/Ileostomy/Ileal Conduit: No       Date of Last BM: 10/29/22    Intake/Output Summary (Last 24 hours) at 10/29/2022 1319  Last data filed at 10/29/2022 1040  Gross per 24 hour   Intake 480 ml   Output --   Net 480 ml     I/O last 3 completed shifts: In: 2065.1 [P.O.:480; I.V.:555; IV Piggyback:1030.1]  Out: -     Safety Concerns:     Sundowners Sundrome, History of Falls (last 30 days), and At Risk for Falls    Impairments/Disabilities:      Hearing impaired-Has bilateral hearing aides. Nutrition Therapy:  Current Nutrition Therapy:   - Oral Diet:  General    Routes of Feeding: Oral  Liquids: Thin Liquids  Daily Fluid Restriction: no  Last Modified Barium Swallow with Video (Video Swallowing Test): not done    Treatments at the Time of Hospital Discharge:   Respiratory Treatments: Room air  Oxygen Therapy:  is not on home oxygen therapy. Ventilator:    - No ventilator support    Rehab Therapies: Physical Therapy and Occupational Therapy  Weight Bearing Status/Restrictions: No weight bearing restrictions  Other Medical Equipment (for information only, NOT a DME order):  walker  Other Treatments: N/A    Patient's personal belongings (please select all that are sent with patient):  Glasses, Hearing Aides bilateral    RN SIGNATURE:  Electronically signed by Monica Salinas RN on 10/29/22 at 1:34 PM EDT    CASE MANAGEMENT/SOCIAL WORK SECTION    Inpatient Status Date: 10/27/22-10/29/22    Readmission Risk Assessment Score:  Readmission Risk              Risk of Unplanned Readmission:  15           Discharging to Facility/ Agency   Name: [facility]  Address:[address] [place] [address].  hospitals,  Phone: [phone]  Fax: [phone]    / signature: Electronically signed by MAGNOLIA Sherwood on 10/29/2022 at 2:52 PM     PHYSICIAN SECTION    Prognosis: Guarded    Condition at Discharge: Stable    Rehab Potential (if transferring to Rehab): Fair    Recommended Labs or Other Treatments After Discharge: Home health care , BMP in 1 week     Physician Certification: I certify the above information and transfer of Santo Ward  is necessary for the continuing treatment of the diagnosis listed and that she requires Assisted Living for greater 30 days.      Update Admission H&P: No change in H&P    PHYSICIAN SIGNATURE:  Electronically signed by Matthew Swift MD on 10/29/22 at 1:20 PM EDT

## 2022-10-29 NOTE — H&P
[facility] [address]                     350 [facility], [address]                              HISTORY AND PHYSICAL    PATIENT NAME: Arnold Carmichael                  :        1945  MED REC NO:   [id number]                          ROOM:       6416  ACCOUNT NO:   [de-identified]                           ADMIT DATE: 10/27/2022  PROVIDER:     Lars Molina MD    HISTORY OF PRESENT ILLNESS:  The patient is a 78-year-old white American  lady, who came to the emergency room from assisted living at [facility]. She is overall a poor historian and very hard of hearing. Majority of the history was gathered by the patient's close friend, who  is also present in the room. According to her, she was having a change  in mental status. She was found on the floor. She had head pain,  rating at 10/10. There was no loss of consciousness. No seizure  activity. It is very unclear how she fell on the ground. She denied  any chest pain. Denied any shortness of breath. Denied any stroke-like  symptoms. Denied any loss of consciousness or dizziness presently,  although she earlier may have felt dizzy and she just sat on the floor  into a corner of her room. She did strike her head. There was no ear, nose and throat bleeding. She does not believe she lost consciousness. She is uncertain how long  she was on the ground; however, she believes it was less than an hour. She really could not get up right away. No chest pain. No trouble  breathing. She also often gets urinary tract infection.     PAST MEDICAL HISTORY:  Pertinent for Alzheimer's dementia, hypertension,  osteoarthritis, cataract, Crohn disease, dementia, dental bridge, dental  crowns, goiter, loss of hearing, herniated lumbar intervertebral disc,  hyperlipidemia, pancreatitis, hypertension, irritable bowel syndrome,  _____, has osteoarthritis of the foot with severe associated tendonitis,  type 2 diabetes mellitus without mention of complication. PAST SURGICAL HISTORY:  Pertinent for cardiac surgery, breast biopsy,  breast lumpectomy, lumbar spine surgery, carpal tunnel release,  hysterectomy, insertable cardiac monitor, eye surgery, colonoscopy, and  axillary surgery. MEDICATIONS:  The patient is on amlodipine, ciprofloxacin, Aricept,  Lovenox, Lantus, Humalog, lisinopril, HydroDIURIL, Versed, potassium  chloride, and Nolvadex. ALLERGIES:  The patient is allergic to AMOXICILLIN, NITROFURANTOIN, and  BACTRIM. SOCIAL HISTORY:  She is a . She has two children, few  grandchildren. She quit smoking 30 plus years ago. No history of  alcohol usage of any significance. 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 FAMILY HISTORY:  Both the parents are . Father had some kind of  cancer. Mother had atherosclerotic heart disease. REVIEW OF SYSTEMS:  Negative for loss of consciousness. No convulsions. No visual blurring. No speech disturbance. No angina pectoris. Does  have exertional shortness of breath. No abdominal pain. No  hematemesis. No melena. No genitourinary complaint except urinary  frequency and occasional dysuria. Does have chronic musculoskeletal  pain. No neurologic symptoms. No convulsions. PHYSICAL EXAMINATION:  GENERAL:  She is alert, awake, and oriented x2, moderately distressed,  slightly disoriented and confused elderly woman, relatively maintaining  her present attire. VITAL SIGNS:  Temperature 98.5, blood pressure 133/61, respirations 18,  heart rate 69. O2 sat 100% on room air. HEENT:  Oral mucosa dry. SKIN:  Warm and dry. NECK:  Supple. Faint carotid bruit. No jugular venous distention. No  lymphadenopathy. No thyromegaly. LUNGS:  Vesicular breath sounds. Poor inspiration. No crackles or  wheezing. HEART:  Regular rate and rhythm. S1 and S2 without any S3 or S4 gallop. ABDOMEN:  Soft, nontender.   Bowel sounds are present. EXTREMITIES:  Trace edema. Distal pulsations are weak. NEUROLOGIC:  The patient appears intact. Lab evaluation shows sodium 133, potassium 2.9, chloride 102, CO2 of 21,  BUN 28, creatinine 1.2. Initially, BUN was 32 and creatinine was 1.5. Anion gap was 18. Magnesium level 1.60. Troponin level was 0.02. Blood sugar is 322, went as high as 403. White blood cell count 9.2,  hemoglobin/hematocrit 13.5 and 39.5, platelet count is 731. Blood  cultures have been drawn. Urinalysis negative. CT of the head without contrast shows no acute intracranial findings,  re-demonstration of a CSF-filled area in the left temporal and frontal  region with mild mass effect and midline shift from left to right also  previously seen, which may represent an extra-axial arachnoid cyst, age  appropriate atrophy. CT of the cervical spine shows no acute  abnormality of the cervical spine, re-demonstration of mild  anterolisthesis of C7 over T1, which is likely degenerative, _____  multilevel degenerative disc disease. No acute radiographic abnormality  of the left elbow. No acute left knee abnormality. Moderate  tricompartmental degenerative arthritis. No acute malalignment of  compression injury of the lumbar spine. Urinalysis, a small amount of blood. The patient had a urine culture  that was drawn earlier and it is growing E-coli. It was drawn in the ER  just two days ago when she visited ER. ASSESSMENT:  Status post fall, altered mental status, low-grade urinary  tract infection with E-coli, Alzheimer's dementia, hypokalemia,  hypomagnesemia. PLAN:  Get her admitted. Treat her with IV hydration, IV ceftriaxone,  urine culture and sensitivity. PT, OT and speech eval.  The patient is  DNR comfort care.         Verner Mars, MD    D: 10/28/2022 22:08:45       T: 10/28/2022 22:12:41     [id number]  Job#: [id number]     Doc#: [id number]    CC:

## 2022-10-30 LAB
ESTIMATED AVERAGE GLUCOSE: 220.2 MG/DL
HBA1C MFR BLD: 9.3 %

## 2022-10-31 LAB
BLOOD CULTURE, ROUTINE: NORMAL
CULTURE, BLOOD 2: NORMAL

## 2022-11-01 ENCOUNTER — TELEPHONE (OUTPATIENT)
Dept: FAMILY MEDICINE CLINIC | Age: 77
End: 2022-11-01

## 2022-11-01 NOTE — TELEPHONE ENCOUNTER
[facility] from home health agency called because yousif diaz sent referral to them for pt to have nursing, PT,OT, and speech therapy      Will Brenda Morales CNP sign orders for this to happen and follow pt through home care?        Please advise and call at [phone]   VERBAL ORDERS

## 2022-11-03 ENCOUNTER — TELEPHONE (OUTPATIENT)
Dept: FAMILY MEDICINE CLINIC | Age: 77
End: 2022-11-03

## 2022-11-03 NOTE — TELEPHONE ENCOUNTER
[address] forms for skilled nursing  Signed and given to [employer] and fax   Ok to close once done

## 2022-11-15 ENCOUNTER — OFFICE VISIT (OUTPATIENT)
Dept: FAMILY MEDICINE CLINIC | Age: 77
End: 2022-11-15
Payer: MEDICARE

## 2022-11-15 VITALS
WEIGHT: 165.2 LBS | DIASTOLIC BLOOD PRESSURE: 60 MMHG | SYSTOLIC BLOOD PRESSURE: 130 MMHG | HEART RATE: 62 BPM | BODY MASS INDEX: 26.66 KG/M2 | OXYGEN SATURATION: 100 %

## 2022-11-15 DIAGNOSIS — E83.42 HYPOMAGNESEMIA: ICD-10-CM

## 2022-11-15 DIAGNOSIS — Z09 HOSPITAL DISCHARGE FOLLOW-UP: ICD-10-CM

## 2022-11-15 DIAGNOSIS — N30.00 ACUTE CYSTITIS WITHOUT HEMATURIA: ICD-10-CM

## 2022-11-15 DIAGNOSIS — G30.9 MODERATE ALZHEIMER'S DEMENTIA WITH AGITATION, UNSPECIFIED TIMING OF DEMENTIA ONSET (HCC): ICD-10-CM

## 2022-11-15 DIAGNOSIS — R41.82 ALTERED MENTAL STATUS, UNSPECIFIED ALTERED MENTAL STATUS TYPE: Primary | ICD-10-CM

## 2022-11-15 DIAGNOSIS — E11.9 TYPE 2 DIABETES MELLITUS WITHOUT COMPLICATION, WITH LONG-TERM CURRENT USE OF INSULIN (HCC): Chronic | ICD-10-CM

## 2022-11-15 DIAGNOSIS — F02.B11 MODERATE ALZHEIMER'S DEMENTIA WITH AGITATION, UNSPECIFIED TIMING OF DEMENTIA ONSET (HCC): ICD-10-CM

## 2022-11-15 DIAGNOSIS — E87.6 HYPOKALEMIA: ICD-10-CM

## 2022-11-15 DIAGNOSIS — Z79.4 TYPE 2 DIABETES MELLITUS WITHOUT COMPLICATION, WITH LONG-TERM CURRENT USE OF INSULIN (HCC): Chronic | ICD-10-CM
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
## 2022-11-15 LAB
ANION GAP SERPL CALCULATED.3IONS-SCNC: 18 MMOL/L (ref 3–16)
BUN BLDV-MCNC: 21 MG/DL (ref 7–20)
CALCIUM SERPL-MCNC: 9.4 MG/DL (ref 8.3–10.6)
CHLORIDE BLD-SCNC: 103 MMOL/L (ref 99–110)
CO2: 20 MMOL/L (ref 21–32)
CREAT SERPL-MCNC: 0.9 MG/DL (ref 0.6–1.2)
GFR SERPL CREATININE-BSD FRML MDRD: >60 ML/MIN/{1.73_M2}
GLUCOSE BLD-MCNC: 163 MG/DL (ref 70–99)
MAGNESIUM: 1.9 MG/DL (ref 1.8–2.4)
POTASSIUM SERPL-SCNC: 4.4 MMOL/L (ref 3.5–5.1)
SODIUM BLD-SCNC: 141 MMOL/L (ref 136–145)

## 2022-11-15 PROCEDURE — 3046F HEMOGLOBIN A1C LEVEL >9.0%: CPT | Performed by: NURSE PRACTITIONER

## 2022-11-15 PROCEDURE — 1123F ACP DISCUSS/DSCN MKR DOCD: CPT | Performed by: NURSE PRACTITIONER

## 2022-11-15 PROCEDURE — 3074F SYST BP LT 130 MM HG: CPT | Performed by: NURSE PRACTITIONER

## 2022-11-15 PROCEDURE — 1111F DSCHRG MED/CURRENT MED MERGE: CPT | Performed by: NURSE PRACTITIONER

## 2022-11-15 PROCEDURE — 3078F DIAST BP <80 MM HG: CPT | Performed by: NURSE PRACTITIONER

## 2022-11-15 PROCEDURE — 99215 OFFICE O/P EST HI 40 MIN: CPT | Performed by: NURSE PRACTITIONER

## 2022-11-15 ASSESSMENT — ENCOUNTER SYMPTOMS
ABDOMINAL PAIN: 0
VOMITING: 0
CONSTIPATION: 0
DIARRHEA: 0
SHORTNESS OF BREATH: 0

## 2022-11-15 ASSESSMENT — PATIENT HEALTH QUESTIONNAIRE - PHQ9: DEPRESSION UNABLE TO ASSESS: URGENT/EMERGENT SITUATION

## 2022-11-15 NOTE — PROGRESS NOTES
Post-Discharge Transitional Care Follow Up      Fan Singh   YOB: 1945    Date of Office Visit:  11/15/2022  Date of Hospital Admission: 10/27/22  Date of Hospital Discharge: 10/29/22  Readmission Risk Score (high >=14%. Medium >=10%):Readmission Risk Score: 12.6      Care management risk score Rising risk (score 2-5) and Complex Care (Scores >=6): No Risk Score On File     Non face to face  following discharge, date last encounter closed (first attempt may have been earlier): *No documented post hospital discharge outreach found in the last 14 days     Call initiated 2 business days of discharge: *No response recorded in the last 14 days     Altered mental status, unspecified altered mental status type  Overall improving  Continue current medications  -     External Referral To [facility]  Safety tips reviewed today  Son looking into new ECF placement for memory care vs assisted living she is in now. Acute cystitis without hematuria  Unclear control,  pt unable to void today. Sample cup taken home, will run clx once sample received    Moderate Alzheimer's dementia with agitation, unspecified timing of dementia onset ([address]  Worsening  Lengthy discussion with son about care at [facility]. Concerned with care gaps, med compliance, ADLs and fall risk. Per son, he will meet with Lexis Villarreal to discuss expectations and ability from assisted living. I expressed concerns to son about safety- high risk for falls, lack of hygiene and may consider skilled nursing placement. He is looking into memory care ECF for possible placement  -     External Referral To Home Health    Type 2 diabetes mellitus without complication, with long-term current use of insulin ([address]  Uncontrolled  Son will send current med list to me later this week. He thinks she is receiving Lantus nightly, does not know about meal time. Does not know dose. Hypokalemia  Resolved while in patient.  Son unsure if taking supplement at present  -     Basic Metabolic Panel; Future    Hypomagnesemia  Resolved while inpatient   -     Magnesium; Future    Hospital discharge follow-up  -     OR DISCHARGE MEDS RECONCILED W/ CURRENT OUTPATIENT MED LIST  Records reviewed today    Medical Decision Making: moderate complexity  Return in about 4 weeks (around 12/13/2022) for dementia follow up. On this date 11/15/2022 I have spent 45 minutes reviewing previous notes, test results and face to face with the patient discussing the diagnosis and importance of compliance with the treatment plan as well as documenting on the day of the visit. Subjective:   HPI    HPI provided by son d/t pt's dementia    Inpatient course: Discharge summary reviewed- see chart. Interval history/Current status: discharged home to skilled nursing at [facility], there for 2 nights. Back in assisted living. Per son and close friend pt getting back to her normal mental status. Increasing mood changes and agitation. They are not usually with her in the evenings. Was sent home with antibiotic for UTI, son thinks it was completed. Started back on insulin, getting injection nightly, not sure if receiving meal time. Not sure pt is taking meds every day. Staff at [facility] will remind pt to take meds and bathe, but pt frequently refuses. No falls since home. Son thinks pt is eating at least one meal/day. Per son, pt passed testing for PT and OT, therefore she is not receiving.          Hemoglobin A1C (%)   Date Value   10/28/2022 9.3   10/25/2022 9.0   06/16/2022 6.4   06/23/2017 6.7       Patient Active Problem List   Diagnosis    Diabetes mellitus ([facility] [address]    Personal history of breast cancer    Non-specific colitis    Left bundle branch block    Meniere's disease    Pure hypercholesterolemia    Spinal stenosis    Essential hypertension    Ductal carcinoma in situ of left breast    Encounter for monitoring tamoxifen therapy    Lumbar stenosis    Lumbar radiculopathy S/P lumbar laminectomy    S/P discectomy for herniated nucleus pulposus    Goiter    Alzheimer's dementia ([facility] [address]    Bilateral hearing loss    Altered mental status    UTI (urinary tract infection)    Hypokalemia    Uncontrolled type 2 diabetes mellitus with hyperglycemia ([facility] [address]       Medications listed as ordered at the time of discharge from hospital     Medication List            Accurate as of November 15, 2022 12:58 PM. If you have any questions, ask your nurse or doctor. CHANGE how you take these medications      tamoxifen 20 MG tablet  Commonly known as: NOLVADEX  TAKE 1 TABLET BY MOUTH EVERY DAY  What changed: additional instructions            CONTINUE taking these medications      amLODIPine 5 MG tablet  Commonly known as: NORVASC     donepezil 5 MG tablet  Commonly known as: ARICEPT  Take 1 tablet by mouth nightly     FreeStyle Kwesi 14 Day Sensor Misc     glucose-vitamin C 4-6 GM-MG Chew chewable tablet  Take 4 tablets by mouth as needed (low BS)     insulin glargine 100 UNIT/ML injection vial  Commonly known as: LANTUS  Inject 20 Units into the skin nightly     lisinopril-hydroCHLOROthiazide 20-25 MG per tablet  Commonly known as: PRINZIDE;ZESTORETIC     potassium chloride 20 MEQ extended release tablet  Commonly known as: KLOR-CON M  Take 1 tablet by mouth 3 times daily (with meals) for 10 days            STOP taking these medications      HumaLOG 100 UNIT/ML injection cartridge  Generic drug: insulin lispro  Stopped by: LUCIANA Romero CNP     insulin lispro 100 UNIT/ML Soln injection vial  Commonly known as: HUMALOG  Stopped by: LUCIANA Romero CNP               Medications marked \"taking\" at this time  No outpatient medications have been marked as taking for the 11/15/22 encounter (Office Visit) with LUCIANA Romero CNP.         Medications patient taking as of now reconciled against medications ordered at time of hospital discharge: Yes    Review of Systems

## 2022-11-16 DIAGNOSIS — Z79.4 TYPE 2 DIABETES MELLITUS WITHOUT COMPLICATION, WITH LONG-TERM CURRENT USE OF INSULIN (HCC): Primary | ICD-10-CM

## 2022-11-16 DIAGNOSIS — E11.9 TYPE 2 DIABETES MELLITUS WITHOUT COMPLICATION, WITH LONG-TERM CURRENT USE OF INSULIN (HCC): Primary | ICD-10-CM

## 2022-11-16 RX ORDER — INSULIN GLARGINE 300 U/ML
20 INJECTION, SOLUTION SUBCUTANEOUS NIGHTLY
Qty: 3 ADJUSTABLE DOSE PRE-FILLED PEN SYRINGE | Refills: 3 | Status: SHIPPED | OUTPATIENT
Start: 2022-11-16

## 2022-11-16 RX ORDER — LISINOPRIL AND HYDROCHLOROTHIAZIDE 25; 20 MG/1; MG/1
2 TABLET ORAL DAILY
Qty: 30 TABLET | COMMUNITY
Start: 2022-11-16

## 2022-11-27 PROBLEM — N39.0 UTI (URINARY TRACT INFECTION): Status: RESOLVED | Noted: 2022-10-28 | Resolved: 2022-11-27

## 2022-12-01 ENCOUNTER — PATIENT MESSAGE (OUTPATIENT)
Dept: FAMILY MEDICINE CLINIC | Age: 77
End: 2022-12-01

## 2022-12-01 DIAGNOSIS — F02.B11 MODERATE ALZHEIMER'S DEMENTIA WITH AGITATION, UNSPECIFIED TIMING OF DEMENTIA ONSET (HCC): Primary | ICD-10-CM

## 2022-12-01 DIAGNOSIS — G30.9 MODERATE ALZHEIMER'S DEMENTIA WITH AGITATION, UNSPECIFIED TIMING OF DEMENTIA ONSET (HCC): Primary | ICD-10-CM

## 2022-12-01 NOTE — TELEPHONE ENCOUNTER
From: Sola Wagner  To: Yumiko Burr  Sent: 12/1/2022 10:52 AM EST  Subject: Medication    Good morning Elspeth Dubin! Deedee's son Rolf Villalba and I had a meeting with the director of the [address] [place] [address] unit at [facility]. Charisse Roman is being combative with staff about her personal hygiene, becoming more paranoid and experiencing more sundowners. ( which I've witnessed several times) I know that you said that when her UTI was resolved you would consider giving her something to help alleviate some of her stress which will allow her to remain in AL. She does have an appointment on the 15th to see you. Should we make that earlier (ASAP)or can you prescribe something now? Thank you for your time.   Courtney Carbajal

## 2022-12-01 NOTE — PROGRESS NOTES
Patient seen , discharge dictated scripts given , arrangements made , LEON completed .  Discussed with nursing staff  And   If applicable ,  Discussed with  Patient's family , all questions answered and concerns addressed  When applicable

## 2022-12-02 NOTE — DISCHARGE SUMMARY
upt\[facility]\"" 124                     350 [facility], [address]                               DISCHARGE SUMMARY    PATIENT NAME: Regina Elam                  :        1945  MED REC NO:   [id number]                          ROOM:       5580  ACCOUNT NO:   [de-identified]                           ADMIT DATE: 10/27/2022  PROVIDER:     Matthew Swift MD                  DISCHARGE DATE:  10/29/2022    FINAL DIAGNOSES:  1.  Status post fall. 2.  Altered mental status. 3. Low-grade urinary tract infection. 4.  Alzheimer's dementia. 5.  Hypokalemia. 6.  Hypomagnesemia. DISCHARGE MEDICATIONS:  1. Ativan 0.5 mg every 4 hours p.r.n.  2.  Glucose with vitamin C, four tablets as needed for low blood sugar. 3.  Cipro 500 mg p.o. b.i.d. for 10 days. 4.  Potassium chloride 20 mEq p.o. t.i.d.  5.  Lantus 20 units nightly. 6.  Humalog 0 to 4 units into the skin nightly. 7.  Toujeo was discontinued since the patient was going to be on Lantus. 8.  Amlodipine 5 mg once a day. 9.  Aricept 5 mg nightly. 10.  Tamoxifen 20 mg daily. HOSPITAL COURSE:  This 77-year-old white American lady from [address] [place] [address] in [facility] with a history of changed mental status, found on  the floor. She did have headache. No loss of consciousness. No  seizure activity. Temperature 98.5, blood pressure 133/61, respirations  18, heart rate 69, O2 sat 100% on room air. Lab evaluation shows sodium  133, potassium 2.9, chloride 102, CO2 21, BUN 28, creatinine 1.2,  magnesium level 1.60, troponin was 0.02. Blood sugar 322. White blood  cell count 9.2, hemoglobin and hematocrit 13.5 and 39.5, platelet count  964. Blood cultures were drawn. Urinalysis negative. Head CT without  contrast showed no evidence of acute intracranial finding. CT of the  cervical spine shows no acute abnormality. Mild anterolisthesis of C7  over T1. Moderate tricompartmental arthritis.   Urinalysis showed a  small amount of blood. Urine culture was growing E. coli. The patient  treated with IV hydration, IV ceftriaxone, and urine culture and  sensitivity. She is DNR comfort care. PT and OT and Speech evaluation  were given. The patient discharged in stable condition on 10/29/2022 to  [facility]. Transportations were arranged. Discharged in  stable condition.         Socrates Mary MD    D: 12/01/2022 4:29:06       T: 12/01/2022 20:33:51     [id number]  Job#: [id number]     Doc#: [id number]    CC:

## 2022-12-04 RX ORDER — RISPERIDONE 0.25 MG/1
0.25 TABLET ORAL 2 TIMES DAILY
Qty: 180 TABLET | Refills: 0 | Status: SHIPPED | OUTPATIENT
Start: 2022-12-04 | End: 2023-03-04

## 2022-12-15 ENCOUNTER — OFFICE VISIT (OUTPATIENT)
Dept: FAMILY MEDICINE CLINIC | Age: 77
End: 2022-12-15
Payer: MEDICARE

## 2022-12-15 VITALS
OXYGEN SATURATION: 100 % | HEART RATE: 80 BPM | DIASTOLIC BLOOD PRESSURE: 70 MMHG | SYSTOLIC BLOOD PRESSURE: 142 MMHG | BODY MASS INDEX: 28.05 KG/M2 | WEIGHT: 173.8 LBS

## 2022-12-15 DIAGNOSIS — F02.B11 MODERATE ALZHEIMER'S DEMENTIA WITH AGITATION, UNSPECIFIED TIMING OF DEMENTIA ONSET (HCC): ICD-10-CM

## 2022-12-15 DIAGNOSIS — E11.65 UNCONTROLLED TYPE 2 DIABETES MELLITUS WITH HYPERGLYCEMIA (HCC): ICD-10-CM

## 2022-12-15 DIAGNOSIS — I10 ESSENTIAL HYPERTENSION: ICD-10-CM

## 2022-12-15 DIAGNOSIS — F32.89 OTHER DEPRESSION: ICD-10-CM

## 2022-12-15 DIAGNOSIS — G30.9 MODERATE ALZHEIMER'S DEMENTIA WITH AGITATION, UNSPECIFIED TIMING OF DEMENTIA ONSET (HCC): ICD-10-CM

## 2022-12-15 DIAGNOSIS — R39.9 UTI SYMPTOMS: ICD-10-CM

## 2022-12-15 DIAGNOSIS — Z00.00 INITIAL MEDICARE ANNUAL WELLNESS VISIT: Primary | ICD-10-CM

## 2022-12-15 PROBLEM — F32.A DEPRESSION: Status: ACTIVE | Noted: 2022-12-15

## 2022-12-15 LAB
BILIRUBIN, POC: NORMAL
BLOOD URINE, POC: NORMAL
CLARITY, POC: NORMAL
COLOR, POC: YELLOW
GLUCOSE URINE, POC: 500
KETONES, POC: NORMAL
LEUKOCYTE EST, POC: NORMAL
NITRITE, POC: NORMAL
PH, POC: 6.5
PROTEIN, POC: NORMAL
SPECIFIC GRAVITY, POC: 1.02
UROBILINOGEN, POC: 0.2

## 2022-12-15 PROCEDURE — 3078F DIAST BP <80 MM HG: CPT | Performed by: NURSE PRACTITIONER

## 2022-12-15 PROCEDURE — 99213 OFFICE O/P EST LOW 20 MIN: CPT | Performed by: NURSE PRACTITIONER

## 2022-12-15 PROCEDURE — 81002 URINALYSIS NONAUTO W/O SCOPE: CPT | Performed by: NURSE PRACTITIONER

## 2022-12-15 PROCEDURE — G0438 PPPS, INITIAL VISIT: HCPCS | Performed by: NURSE PRACTITIONER

## 2022-12-15 PROCEDURE — 3046F HEMOGLOBIN A1C LEVEL >9.0%: CPT | Performed by: NURSE PRACTITIONER

## 2022-12-15 PROCEDURE — 1123F ACP DISCUSS/DSCN MKR DOCD: CPT | Performed by: NURSE PRACTITIONER

## 2022-12-15 PROCEDURE — 3074F SYST BP LT 130 MM HG: CPT | Performed by: NURSE PRACTITIONER

## 2022-12-15 RX ORDER — CIPROFLOXACIN 500 MG/1
500 TABLET, FILM COATED ORAL EVERY 12 HOURS SCHEDULED
Qty: 10 TABLET | Refills: 0 | Status: SHIPPED | OUTPATIENT
Start: 2022-12-15 | End: 2022-12-20

## 2022-12-15 RX ORDER — FLUOXETINE 10 MG/1
10 TABLET, FILM COATED ORAL DAILY
Qty: 90 TABLET | Refills: 3 | Status: SHIPPED | OUTPATIENT
Start: 2022-12-15 | End: 2023-12-15

## 2022-12-15 RX ORDER — LISINOPRIL AND HYDROCHLOROTHIAZIDE 20; 12.5 MG/1; MG/1
2 TABLET ORAL DAILY
Qty: 180 TABLET | Refills: 3 | Status: SHIPPED | OUTPATIENT
Start: 2022-12-15 | End: 2023-12-15

## 2022-12-15 ASSESSMENT — PATIENT HEALTH QUESTIONNAIRE - PHQ9
SUM OF ALL RESPONSES TO PHQ QUESTIONS 1-9: 1
SUM OF ALL RESPONSES TO PHQ9 QUESTIONS 1 & 2: 1
1. LITTLE INTEREST OR PLEASURE IN DOING THINGS: 0
1. LITTLE INTEREST OR PLEASURE IN DOING THINGS: 0
SUM OF ALL RESPONSES TO PHQ QUESTIONS 1-9: 1
SUM OF ALL RESPONSES TO PHQ9 QUESTIONS 1 & 2: 1
SUM OF ALL RESPONSES TO PHQ QUESTIONS 1-9: 1
2. FEELING DOWN, DEPRESSED OR HOPELESS: 1
2. FEELING DOWN, DEPRESSED OR HOPELESS: 1
SUM OF ALL RESPONSES TO PHQ QUESTIONS 1-9: 1

## 2022-12-15 ASSESSMENT — LIFESTYLE VARIABLES: HOW OFTEN DO YOU HAVE A DRINK CONTAINING ALCOHOL: NEVER

## 2022-12-15 NOTE — PATIENT INSTRUCTIONS
Preventing Falls: Care Instructions  Overview     Getting around your home safely can be a challenge if you have injuries or health problems that make it easy for you to fall. Loose rugs and furniture in walkways are among the dangers for many older people who have problems walking or who have poor eyesight. People who have conditions such as arthritis, osteoporosis, or dementia also have to be careful not to fall. You can make your home safer with a few simple measures. Follow-up care is a key part of your treatment and safety. Be sure to make and go to all appointments, and call your doctor if you are having problems. It's also a good idea to know your test results and keep a list of the medicines you take. How can you care for yourself at home? Taking care of yourself  Exercise regularly to improve your strength, muscle tone, and balance. Walk if you can. Swimming may be a good choice if you cannot walk easily. Have your vision and hearing checked each year or any time you notice a change. If you have trouble seeing and hearing, you might not be able to avoid objects and could lose your balance. Know the side effects of the medicines you take. Ask your doctor or pharmacist whether the medicines you take can affect your balance. Sleeping pills or sedatives can affect your balance. Limit the amount of alcohol you drink. Alcohol can impair your balance and other senses. Ask your doctor whether calluses or corns on your feet need to be removed. If you wear loose-fitting shoes because of calluses or corns, you can lose your balance and fall. Talk to your doctor if you have numbness in your feet. You may get dizzy if you do not drink enough water. To prevent dehydration, drink plenty of fluids. Choose water and other clear liquids. If you have kidney, heart, or liver disease and have to limit fluids, talk with your doctor before you increase the amount of fluids you drink.   Preventing falls at home  Remove raised doorway thresholds, throw rugs, and clutter. Repair loose carpet or raised areas in the floor. Move furniture and electrical cords to keep them out of walking paths. Use nonskid floor wax, and wipe up spills right away, especially on ceramic tile floors. If you use a walker or cane, put rubber tips on it. If you use crutches, clean the bottoms of them regularly with an abrasive pad, such as steel wool. Keep your house well lit, especially stairways, porches, and outside walkways. Use night-lights in areas such as hallways and bathrooms. Add extra light switches or use remote switches (such as switches that go on or off when you clap your hands) to make it easier to turn lights on if you have to get up during the night. Install sturdy handrails on stairways. Move items in your cabinets so that the things you use a lot are on the lower shelves (about waist level). Keep a cordless phone and a flashlight with new batteries by your bed. If possible, put a phone in each of the main rooms of your house, or carry a cell phone in case you fall and cannot reach a phone. Or, you can wear a device around your neck or wrist. You push a button that sends a signal for help. Wear low-heeled shoes that fit well and give your feet good support. Use footwear with nonskid soles. Check the heels and soles of your shoes for wear. Repair or replace worn heels or soles. Do not wear socks without shoes on smooth floors, such as wood. Walk on the grass when the sidewalks are slippery. If you live in an area that gets snow and ice in the winter, sprinkle salt on slippery steps and sidewalks. Or ask a family member or friend to do this for you. Preventing falls in the bath  Install grab bars and nonskid mats inside and outside your shower or tub and near the toilet and sinks. Use shower chairs and bath benches. Use a hand-held shower head that will allow you to sit while showering.   Get into a tub or shower by putting the weaker leg in first. Get out of a tub or shower with your strong side first.  Repair loose toilet seats and consider installing a raised toilet seat to make getting on and off the toilet easier. Keep your bathroom door unlocked while you are in the shower. Where can you learn more? Go to http://www.benjamin.com/ and enter G117 to learn more about \"Preventing Falls: Care Instructions. \"  Current as of: May 4, 2022               Content Version: 13.5  © 8860-2926 Healthwise, Incorporated. Care instructions adapted under license by [facility] ([facility]). If you have questions about a medical condition or this instruction, always ask your healthcare professional. [address] any warranty or liability for your use of this information. Learning About Emotional Support  When do you need emotional support? You might find getting support from others helpful when you have a long-term health problem. Often people feel alone, confused, or scared when coping with an illness. But you aren't alone. Other people are going through the same thing you are and know how you feel. Talking with others about your feelings can help you feel better. Your family and friends can give you support. So can your doctor, a support group, or a Orthodoxy. If you have a support network, you will not feel as alone. You will learn new ways to deal with your situation, and you may try harder to overcome it. Where you can get support  Family and friends: They can help you cope by giving you comfort and encouragement. Counseling: Professional counseling can help you cope with situations that interfere with your life and cause stress. Counseling can help you understand and deal with your illness. Your doctor: Find a doctor you trust and feel comfortable with. Be open and honest about your fears and concerns. Your doctor can help you get the right medical treatments, including counseling.   Spiritual or Temple groups: They can provide comfort and may be able to help you find counseling or other social support services. Social groups: They can help you meet new people and get involved in activities you enjoy. Community support groups: In a support group, you can talk to others who have dealt with the same problems or illness as you. You can encourage one another and learn ways to cope with tough emotions. How to find a support group  Ask your doctor, counselor, or other health professional for suggestions. Contact your local Lutheran, Alevism, Catholic, or other Temple group. Ask your family and friends. Ask people who have the same health concerns. Go online. Forums and blogs let you read messages from others and leave your own messages. You can exchange stories, vent your frustrations, and ask and answer questions. Contact a city, state, or national group that provides support for your health concerns. Your library or community center may have a list of these groups. Or you can look for information online. Look for a support group that works for you. Ask yourself if you prefer structure and would like a , or if you would like a less formal group. Do you prefer face-to-face meetings? Or do you feel more secure in online chat rooms or forums? Supportive relationships  A supportive relationship includes emotional support such as love, trust, and understanding, as well as advice and concrete help, such as help managing your time. Reach out to others  Family and friends can help you. Ask them to:  Listen to you and give you encouragement. This can keep you from feeling hopeless or alone. Help with small daily tasks or with bigger problems. A helping hand can keep you from feeling overwhelmed. Help you manage a health problem. For example, ask them to go to doctor visits with you. Your loved ones can offer support by being involved in your medical care.   Respect your relationships  A good relationship is also a two-way street. You count on help from others, but they also count on you. Know your friends' limits. You don't have to see or call your friends every day. If you are going through a rough patch, ask friends if you can contact them outside of the usual boundaries. Don't always complain or talk about yourself. Know when it's time to stop talking and listen or just enjoy your friend's company. Know that good friends can be a bad influence. For example, if a friend encourages you to drink when you know it will harm you, you may want to end the friendship. Where can you learn more? Go to http://www.woods.com/ and enter G092 to learn more about \"Learning About Emotional Support. \"  Current as of: February 9, 2022               Content Version: 13.5  © 9939-8457 [employer]. Care instructions adapted under license by [facility] ([facility]). If you have questions about a medical condition or this instruction, always ask your healthcare professional. [address] any warranty or liability for your use of this information. Learning About Stress  What is stress? Stress is what you feel when you have to handle more than you are used to. Stress is a fact of life for most people, and it affects everyone differently. What causes stress for you may not be stressful for someone else. A lot of things can cause stress. You may feel stress when you go on a job interview, take a test, or run a race. This kind of short-term stress is normal and even useful. It can help you if you need to work hard or react quickly. For example, stress can help you finish an important job on time. Stress also can last a long time. Long-term stress is caused by stressful situations or events. Examples of long-term stress include long-term health problems, ongoing problems at work, or conflicts in your family. Long-term stress can harm your health.   How does stress affect your health? When you are stressed, your body responds as though you are in danger. It makes hormones that speed up your heart, make you breathe faster, and give you a burst of energy. This is called the fight-or-flight stress response. If the stress is over quickly, your body goes back to normal and no harm is done. But if stress happens too often or lasts too long, it can have bad effects. Long-term stress can make you more likely to get sick, and it can make symptoms of some diseases worse. If you tense up when you are stressed, you may develop neck, shoulder, or low back pain. Stress is linked to high blood pressure and heart disease. Stress also harms your emotional health. It can make you florian, tense, or depressed. Your relationships may suffer, and you may not do well at work or school. What can you do to manage stress? How to relax your mind   Write. It may help to write about things that are bothering you. This helps you find out how much stress you feel and what is causing it. When you know this, you can find better ways to cope. Let your feelings out. Talk, laugh, cry, and express anger when you need to. Talking with friends, family, a counselor, or a member of the clergy about your feelings is a healthy way to relieve stress. Do something you enjoy. For example, listen to music or go to a movie. Practice your hobby or do volunteer work. Meditate. This can help you relax, because you are not worrying about what happened before or what may happen in the future. Do guided imagery. Imagine yourself in any setting that helps you feel calm. You can use audiotapes, books, or a teacher to guide you. How to relax your body   Do something active. Exercise or activity can help reduce stress. Walking is a great way to get started. Even everyday activities such as housecleaning or yard work can help. Do breathing exercises.  For example:  From a standing position, bend forward from the waist with your knees slightly bent. Let your arms dangle close to the floor. Breathe in slowly and deeply as you return to a standing position. Roll up slowly and lift your head last.  Hold your breath for just a few seconds in the standing position. Breathe out slowly and bend forward from the waist.  Try yoga or abhijeet chi. These techniques combine exercise and meditation. You may need some training at first to learn them. What can you do to prevent stress? Manage your time. This helps you find time to do the things you want and need to do. Get enough sleep. Your body recovers from the stresses of the day while you are sleeping. Get support. Your family, friends, and community can make a difference in how you experience stress. Where can you learn more? Go to http://www.benjamin.com/ and enter N032 to learn more about \"Learning About Stress. \"  Current as of: October 6, 2021               Content Version: 13.5  © 8023-2583 [employer]. Care instructions adapted under license by [facility] ([facility]). If you have questions about a medical condition or this instruction, always ask your healthcare professional. [place] [address] any warranty or liability for your use of this information. Learning About Managing Anger  What causes anger? Many things can cause anger: Stress at work or at home. Social situations that make you angry. A response to everyday events. Anger signals your body to prepare for a fight. This reaction is often called \"fight or flight. \" When you get angry, adrenaline and other hormones are released into your blood. Then your blood pressure goes up, your heart beats faster, and you breathe faster. When you express anger in a healthy way, it can inspire change and make you productive. But if you don't have the skills to express anger in a healthy way, anger can build up. You may hurt others--and yourself--emotionally and even physically.  Violent behavior often starts with verbal threats or fairly minor incidents. But over time, it can involve physical harm. It can include physical, verbal, or sexual abuse of an intimate partner (domestic violence), a child (child abuse), or an older adult (elder abuse). It can also make you sick. Anger and constant hostility keep your blood pressure high. They increase your chances of having another health problem, such as depression, a heart attack, or a stroke. Some people with post-traumatic stress disorder (PTSD) feel angry and on alert all the time. It may feel like there are no other ways to react when you are angry. But when you learn to work with anger in appropriate and healthy ways, your anger no longer controls you. How can you manage your anger? The first step to managing anger is to be more aware of it. Note the thoughts, feelings, and emotions that you have when you get angry. Practice noticing these signs of anger when you are calm. If you are more aware of the signs of anger, you can take steps to manage it. Here are a few tips: Think before you act. Take time to stop and cool down when you feel yourself getting angry. Count to 10 while you take slow, steady breaths. Practice some other form of mental relaxation. Learn the feelings that lead to angry outbursts. Anger and hostility may be a symptom of unhappy feelings or depression about your job, your relationship, or other aspects of your personal life. Avoid situations that lead to angry outbursts. If standing in line bothers you, do errands at less busy times. Express anger in a healthy way. You might:  Go for a short walk or jog. Draw, paint, or listen to music to release the anger. Write in a daily journal.  Use \"I\" statements, not \"you\" statements, to discuss your anger. Say \"I don't feel valued when my needs are not being met\" instead of \"You make me mad when you are so inconsiderate. \"  Take care of yourself. Exercise regularly.   Eat a variety of healthy foods. Don't skip meals. Try to get 8 hours of sleep each night. Limit your use of alcohol, and don't use drugs. Practice yoga, meditation, or abhijeet chi to relax. Explore other resources that may be available through your job or your community. Contact your human resources department at work. You might be able to get services through an employee assistance program.  Contact your local hospital, mental health facility, or health department. Ask what types of programs or support groups are available in your area. Do not keep guns in your home. If you must have guns in your home, unload them and lock them up. Lock ammunition in a separate place. Keep guns away from children. Where can you find help? If anger or stress starts to harm your work or personal relationships, you might seek help. You can learn ways to manage your feelings and actions. Talk to someone you trust, or find a counselor. There are groups in your area that can connect you with people to talk to. [facility] . This service from the [facility] Substance Abuse and [employer]  can help you find local counselors. Search online at [place]. samhsa.gov or call 2-177-869-HELP ([phone]), or Dial a DealerD 2-263.340.9276. Parents Anonymous. Self-help groups that serve parents under stress, as well as children who have been abused, are available throughout the [place], [place] ([facility]), and [facility]. To find a group in your area, search online or in your phone book under Parents Anonymous or call (356) [phone]. Where can you learn more? Go to http://www.benjamin.com/ and enter Z357 to learn more about \"Learning About Managing Anger. \"  Current as of: February 9, 2022               Content Version: 13.5  © 7176-3824 Healthwise, Incorporated. Care instructions adapted under license by [facility] ([facility]).  If you have questions about a medical condition or this instruction, always ask your healthcare professional. [address] any warranty or liability for your use of this information. Learning About Being Active as an Older Adult  Why is being active important as you get older? Being active is one of the best things you can do for your health. And it's never too late to start. Being active--or getting active, if you aren't already--has definite benefits. It can:  Give you more energy,  Keep your mind sharp. Improve balance to reduce your risk of falls. Help you manage chronic illness with fewer medicines. No matter how old you are, how fit you are, or what health problems you have, there is a form of activity that will work for you. And the more physical activity you can do, the better your overall health will be. What kinds of activity can help you stay healthy? Being more active will make your daily activities easier. Physical activity includes planned exercise and things you do in daily life. There are four types of activity:  Aerobic. Doing aerobic activity makes your heart and lungs strong. Includes walking, dancing, and gardening. Aim for at least 2½ hours spread throughout the week. It improves your energy and can help you sleep better. Muscle-strengthening. This type of activity can help maintain muscle and strengthen bones. Includes climbing stairs, using resistance bands, and lifting or carrying heavy loads. Aim for at least twice a week. It can help protect the knees and other joints. Stretching. Stretching gives you better range of motion in joints and muscles. Includes upper arm stretches, calf stretches, and gentle yoga. Aim for at least twice a week, preferably after your muscles are warmed up from other activities. It can help you function better in daily life. Balancing. This helps you stay coordinated and have good posture. Includes heel-to-toe walking, abhijeet chi, and certain types of yoga. Aim for at least 3 days a week.   It can reduce your risk of falling. Even if you have a hard time meeting the recommendations, it's better to be more active than less active. All activity done in each category counts toward your weekly total. You'd be surprised how daily things like carrying groceries, keeping up with grandchildren, and taking the stairs can add up. What keeps you from being active? If you've had a hard time being more active, you're not alone. Maybe you remember being able to do more. Or maybe you've never thought of yourself as being active. It's frustrating when you can't do the things you want. Being more active can help. What's holding you back? Getting started. Have a goal, but break it into easy tasks. Small steps build into big accomplishments. Staying motivated. If you feel like skipping your activity, remember your goal. Maybe you want to move better and stay independent. Every activity gets you one step closer. Not feeling your best.  Start with 5 minutes of an activity you enjoy. Prove to yourself you can do it. As you get comfortable, increase your time. You may not be where you want to be. But you're in the process of getting there. Everyone starts somewhere. How can you find safe ways to stay active? Talk with your doctor about any physical challenges you're facing. Make a plan with your doctor if you have a health problem or aren't sure how to get started with activity. If you're already active, ask your doctor if there is anything you should change to stay safe as your body and health change. If you tend to feel dizzy after you take medicine, avoid activity at that time. Try being active before you take your medicine. This will reduce your risk of falls. If you plan to be active at home, make sure to clear your space before you get started. Remove things like TV cords, coffee tables, and throw rugs. It's safest to have plenty of space to move freely. The key to getting more active is to take it slow and steady. Try to improve only a little bit at a time. Pick just one area to improve on at first. And if an activity hurts, stop and talk to your doctor. Where can you learn more? Go to http://www.benjamin.com/ and enter P600 to learn more about \"Learning About Being Active as an Older Adult. \"  Current as of: October 10, 2022               Content Version: 13.5  © 2006-2022 [employer]. Care instructions adapted under license by [facility] ([facility]). If you have questions about a medical condition or this instruction, always ask your healthcare professional. [place] [address] any warranty or liability for your use of this information. Hearing Loss: Care Instructions  Overview     Hearing loss is a sudden or slow decrease in how well you hear. It can range from mild to severe. Permanent hearing loss can occur with aging. It also can happen when you are exposed long-term to loud noise. Examples include listening to loud music, riding motorcycles, or being around other loud machines. Hearing loss can affect your work and home life. It can make you feel lonely or depressed. You may feel that you have lost your independence. But hearing aids and other devices can help you hear better and feel connected to others. Follow-up care is a key part of your treatment and safety. Be sure to make and go to all appointments, and call your doctor if you are having problems. It's also a good idea to know your test results and keep a list of the medicines you take. How can you care for yourself at home? Avoid loud noises whenever possible. This helps keep your hearing from getting worse. Always wear hearing protection around loud noises. Wear a hearing aid as directed. See a professional who can help you pick a hearing aid that fits you. Have hearing tests as your doctor suggests. They can show whether your hearing has changed. Your hearing aid may need to be adjusted.   Use other devices as needed. These may include:  Telephone amplifiers and hearing aids that can connect to a television, stereo, radio, or microphone. Devices that use lights or vibrations. These alert you to the doorbell, a ringing telephone, or a baby monitor. Television closed-captioning. This shows the words at the bottom of the screen. Most new TVs can do this. TTY (text telephone). This lets you type messages back and forth on the telephone instead of talking or listening. These devices are also called TDD. When messages are typed on the keyboard, they are sent over the phone line to a receiving TTY. The message is shown on a monitor. Use text messaging, social media, and email if it is hard for you to communicate by telephone. Try to learn a listening technique called speechreading. It is not lipreading. You pay attention to people's gestures, expressions, posture, and tone of voice. These clues can help you understand what a person is saying. Face the person you are talking to, and have them face you. Make sure the lighting is good. You need to see the other person's face clearly. Think about counseling if you need help to adjust to your hearing loss. When should you call for help? Watch closely for changes in your health, and be sure to contact your doctor if:    You think your hearing is getting worse.     You have new symptoms, such as dizziness or nausea. Where can you learn more? Go to http://www.benjamin.com/ and enter R798 to learn more about \"Hearing Loss: Care Instructions. \"  Current as of: May 4, 2022               Content Version: 13.5  © 1456-7669 Healthwise, Incorporated. Care instructions adapted under license by [facility] ([facility]). If you have questions about a medical condition or this instruction, always ask your healthcare professional. [place] [address] any warranty or liability for your use of this information. Learning About Vision Tests  What are vision tests? The four most common vision tests are visual acuity tests, refraction, visual field tests, and color vision tests. Visual acuity (sharpness) tests  These tests are used: To see if you need glasses or contact lenses. To monitor an eye problem. To check an eye injury. Visual acuity tests are done as part of routine exams. You may also have this test when you get your 's license or apply for some types of jobs. Visual field tests  These tests are used: To check for vision loss in any area of your range of vision. To screen for certain eye diseases. To look for nerve damage after a stroke, head injury, or other problem that could reduce blood flow to the brain. Refraction and color tests  A refraction test is done to find the right prescription for glasses and contact lenses. A color vision test is done to check for color blindness. Color vision is often tested as part of a routine exam. You may also have this test when you apply for a job where recognizing different colors is important, such as , electronics, or the [employer]. How are vision tests done? Visual acuity test   You cover one eye at a time. You read aloud from a wall chart across the room. You read aloud from a small card that you hold in your hand. Refraction   You look into a special device. The device puts lenses of different strengths in front of each eye to see how strong your glasses or contact lenses need to be. Visual field tests   Your doctor may have you look through special machines. Or your doctor may simply have you stare straight ahead while they move a finger into and out of your field of vision. Color vision test   You look at pieces of printed test patterns in various colors. You say what number or symbol you see. Your doctor may have you trace the number or symbol using a pointer. How do these tests feel?   There is very little chance of having a problem from this test. If dilating drops are used for a vision test, they may make the eyes sting and cause a medicine taste in the mouth. Follow-up care is a key part of your treatment and safety. Be sure to make and go to all appointments, and call your doctor if you are having problems. It's also a good idea to know your test results and keep a list of the medicines you take. Where can you learn more? Go to http://www.benjamin.com/ and enter G551 to learn more about \"Learning About Vision Tests. \"  Current as of: October 12, 2022               Content Version: 13.5  © 2700-5290 [employer]. Care instructions adapted under license by [facility] ([facility]). If you have questions about a medical condition or this instruction, always ask your healthcare professional. [address] any warranty or liability for your use of this information. Learning About Activities of Daily Living  What are activities of daily living? Activities of daily living (ADLs) are the basic self-care tasks you do every day. As you age, and if you have health problems, you may find that it's harder to do these things for yourself. That's when you may need some help. Your doctor uses ADLs to measure how much help you need. Knowing what you can and can't do for yourself is an important first step to getting help. And when you have the help you need, you can stay as independent as possible. Your doctor will want to know if you are able to do tasks such as: Take a bath or shower without help. Go to the bathroom by yourself. Dress and undress without help. Shave, comb your hair, and brush teeth on your own. Get in and out of bed or a chair without help. Feed yourself without help. If you are having trouble doing basic self-care tasks, talk with your doctor. You may want to bring a caregiver or family member who can help the doctor understand your needs and abilities. How will a doctor assess your ADLs?   Asking about ADLs is part of a routine health checkup your doctor will likely do as you age. Your health check might be done in a doctor's office, in your home, or at a hospital. The goal is to find out if you are having any problems that could make your health problems worse or that make it unsafe for you to be on your own. To measure your ADLs, your doctor will ask how hard it is for you to do routine tasks. He or she may also want to know if you have changed the way you do a task because of a health problem. He or she may watch how you:  Walk back and forth. Keep your balance while you stand or walk. Move from sitting to standing or from a bed to a chair. Button or unbutton a shirt or sweater. Remove and put on your shoes. It's normal to feel a little worried or anxious if you find you can't do all the things you used to be able to do. Talking with your doctor about ADLs isn't a test that you either pass or fail. It's just a way to get more information about your health and safety. Follow-up care is a key part of your treatment and safety. Be sure to make and go to all appointments, and call your doctor if you are having problems. It's also a good idea to know your test results and keep a list of the medicines you take. Current as of: October 6, 2021               Content Version: 13.5  © 2006-2022 Healthwise, Incorporated. Care instructions adapted under license by [facility] ([facility]). If you have questions about a medical condition or this instruction, always ask your healthcare professional. [place] [address] any warranty or liability for your use of this information. Advance Directives: Care Instructions  Overview  An advance directive is a legal way to state your wishes at the end of your life. It tells your family and your doctor what to do if you can't say what you want. There are two main types of advance directives. You can change them any time your wishes change. Living will.   This form tells your family and your doctor your wishes about life support and other treatment. The form is also called a declaration. Medical power of . This form lets you name a person to make treatment decisions for you when you can't speak for yourself. This person is called a health care agent (health care proxy, health care surrogate). The form is also called a durable power of  for health care. If you do not have an advance directive, decisions about your medical care may be made by a family member, or by a doctor or a  who doesn't know you. It may help to think of an advance directive as a gift to the people who care for you. If you have one, they won't have to make tough decisions by themselves. For more information, including forms for your state, see the [address] website (www.caringinfo.org/planning/advance-directives/). Follow-up care is a key part of your treatment and safety. Be sure to make and go to all appointments, and call your doctor if you are having problems. It's also a good idea to know your test results and keep a list of the medicines you take. What should you include in an advance directive? Many states have a unique advance directive form. (It may ask you to address specific issues.) Or you might use a universal form that's approved by many states. If your form doesn't tell you what to address, it may be hard to know what to include in your advance directive. Use the questions below to help you get started. Who do you want to make decisions about your medical care if you are not able to? What life-support measures do you want if you have a serious illness that gets worse over time or can't be cured? What are you most afraid of that might happen? (Maybe you're afraid of having pain, losing your independence, or being kept alive by machines.)  Where would you prefer to die? (Your home? A hospital? A nursing home?)  Do you want to donate your organs when you die?   Do you want certain [employer] practices performed before you die? When should you call for help? Be sure to contact your doctor if you have any questions. Where can you learn more? Go to http://www.benjamin.com/ and enter R264 to learn more about \"Advance Directives: Care Instructions. \"  Current as of: June 16, 2022               Content Version: 13.5  © 1100-8619 [employer]. Care instructions adapted under license by [facility] ([facility]). If you have questions about a medical condition or this instruction, always ask your healthcare professional. [address] any warranty or liability for your use of this information. Personalized Preventive Plan for Ovidio Du - 12/15/2022  Medicare offers a range of preventive health benefits. Some of the tests and screenings are paid in full while other may be subject to a deductible, co-insurance, and/or copay. Some of these benefits include a comprehensive review of your medical history including lifestyle, illnesses that may run in your family, and various assessments and screenings as appropriate. After reviewing your medical record and screening and assessments performed today your provider may have ordered immunizations, labs, imaging, and/or referrals for you. A list of these orders (if applicable) as well as your Preventive Care list are included within your After Visit Summary for your review. Other Preventive Recommendations:    A preventive eye exam performed by an eye specialist is recommended every 1-2 years to screen for glaucoma; cataracts, macular degeneration, and other eye disorders. A preventive dental visit is recommended every 6 months. Try to get at least 150 minutes of exercise per week or 10,000 steps per day on a pedometer . Order or download the FREE \"Exercise & Physical Activity: Your Everyday Guide\" from [employer].  Call 3-802.207.1506 or search [employer] online. You need 0194-1689 mg of calcium and 3816-4769 IU of vitamin D per day. It is possible to meet your calcium requirement with diet alone, but a vitamin D supplement is usually necessary to meet this goal.  When exposed to the sun, use a sunscreen that protects against both UVA and UVB radiation with an SPF of 30 or greater. Reapply every 2 to 3 hours or after sweating, drying off with a towel, or swimming. Always wear a seat belt when traveling in a car. Always wear a helmet when riding a bicycle or motorcycle.

## 2022-12-15 NOTE — PROGRESS NOTES
Medicare Annual Wellness Visit    Patricia Mcginnis is here for Dementia (Follow up ) and Medicare AWV    Assessment & Plan   Initial Medicare annual wellness visit  UTI symptoms  Assessment & Plan:   Unclear control, awaiting clx   Will treat empirically with Cipro while awaiting clx  Orders:  -     POCT Urinalysis no Micro  -     Culture, Urine  -     ciprofloxacin (CIPRO) 500 MG tablet; Take 1 tablet by mouth every 12 hours for 5 days, Disp-10 tablet, R-0Print  Moderate Alzheimer's dementia with agitation, unspecified timing of dementia onset ([facility] [address]  Assessment & Plan:   Borderline controlled, continue current medications    Uncontrolled type 2 diabetes mellitus with hyperglycemia (HCC)  Assessment & Plan:   Unclear control, continue current medications   A1 c at follow up  Essential hypertension  Assessment & Plan:   Borderline controlled, decreasing HCTZ to 25 mg daily   Continue Lisinopril 40 mg daily    Orders:  -     lisinopril-hydroCHLOROthiazide (PRINZIDE;ZESTORETIC) 20-12.5 MG per tablet; Take 2 tablets by mouth daily, Disp-180 tablet, R-3Print  Other depression  Assessment & Plan:   Unclear control, new start Prozac   I've explained to her that drugs of the SSRI class can have side effects such as weight gain, sexual dysfunction, insomnia, headache, nausea. These medications are generally effective at alleviating symptoms of anxiety and/or depression. Let me know if significant side effects do occur. Orders:  -     FLUoxetine (PROZAC) 10 MG tablet; Take 1 tablet by mouth daily, Disp-90 tablet, R-3Print    Recommendations for Preventive Services Due: see orders and patient instructions/AVS.  Recommended screening schedule for the next 5-10 years is provided to the patient in written form: see Patient Instructions/AVS.     Return in 3 months (on 3/15/2023) for Medicare Annual Wellness Visit in 1 year, hypertension follow up.      Subjective   The following acute and/or chronic problems were also addressed today:  Dementia: agitation and sundowning seems to be better since starting Risperdal. Less arguing with staff. Argues over showering. At best taking 1 shower a week. No falls. Per son, memory seems to be worsening. Son reports elevated sugar this week. 2 separate days after eating had sugar in 300's and 500;s. Staff at [facility] giving her nightly insulin injection. Son reports pt c/o being lonely and sad. Per son, she seems depressed and more withdrawn to him. Urine sample brought from home today. Family requesting testing for possible UTI. Patient's complete Health Risk Assessment and screening values have been reviewed and are found in Flowsheets. The following problems were reviewed today and where indicated follow up appointments were made and/or referrals ordered. Positive Risk Factor Screenings with Interventions:    Fall Risk:  Do you feel unsteady or are you worried about falling? : (!) yes  2 or more falls in past year?: (!) yes  Fall with injury in past year?: (!) yes     Interventions:    See AVS for additional education material    Cognitive: Words recalled: 2 Words Recalled           Total Score Interpretation: Abnormal Mini-Cog      Interventions:  Patient declines any further evaluation or treatment           General HRA Questions:  Select all that apply: (!) Loneliness, Social Isolation, Stress, Anger    Loneliness Interventions:  Patient comments: pt agitated today in office. She is demanding to leave, declines to answer questions.      Social Isolation Interventions:  See above    Stress Interventions:  See above    Anger Interventions:  See above       Weight and Activity:  Physical Activity: Inactive    Days of Exercise per Week: 0 days    Minutes of Exercise per Session: 0 min     On average, how many days per week do you engage in moderate to strenuous exercise (like a brisk walk)?: 0 days  Have you lost any weight without trying in the past 3 months?: No Inactivity Interventions:  Patient comments: declines intervention        Vision Screen:  Do you have difficulty driving, watching TV, or doing any of your daily activities because of your eyesight?: (!) Yes  Have you had an eye exam within the past year?: (!) No  No results found. Interventions:   Patient encouraged to make appointment with their eye specialist     ADL's:   Patient reports needing help with:  Select all that apply: (!) Grooming, Bathing, Toileting  Select all that apply: [employer], Housekeeping, Banking/Finances, Shopping, Telephone Use, Food Preparation, Transportation  Interventions:  Pt lives in assisted living at [facility], staff and son manage ADLs and finances                    Objective   Vitals:    12/15/22 0945 12/15/22 1030   BP: (!) 148/70 (!) 142/70   Pulse: 80    SpO2: 100%    Weight: 173 lb 12.8 oz (78.8 kg)       Body mass index is 28.05 kg/m². Physical Exam  Vitals reviewed. Constitutional:       Appearance: Normal appearance. She is well-developed. Cardiovascular:      Rate and Rhythm: Normal rate and regular rhythm. Pulmonary:      Effort: Pulmonary effort is normal.      Breath sounds: Normal breath sounds. Skin:     General: Skin is warm and dry. Neurological:      Mental Status: She is alert. Comments: Oriented to person (son)          Allergies   Allergen Reactions    Amoxicillin Diarrhea    Amoxicillin-Pot Clavulanate Diarrhea    Nitrofuran Derivatives Other (See Comments)     Causes severe abdominal cramping    Bactrim Rash     Prior to Visit Medications    Medication Sig Taking?  Authorizing Provider   ciprofloxacin (CIPRO) 500 MG tablet Take 1 tablet by mouth every 12 hours for 5 days Yes LUCIANA Panchal CNP   lisinopril-hydroCHLOROthiazide (PRINZIDE;ZESTORETIC) 20-12.5 MG per tablet Take 2 tablets by mouth daily Yes LUCIANA Pancahl CNP   FLUoxetine (PROZAC) 10 MG tablet Take 1 tablet by mouth daily Yes LUCIANA Panchal CNP risperiDONE (RISPERDAL) 0.25 MG tablet Take 1 tablet by mouth 2 times daily Yes LUCIANA Sanchez CNP   Insulin Glargine, 2 Unit Dial, (TOUJEO MAX SOLOSTAR) 300 UNIT/ML SOPN Inject 20 Units into the skin at bedtime Yes LUCIANA Sanchez CNP   Insulin Pen Needle 32G X 6 MM MISC Use with basal insulin and sliding scale insulin for SQ injections Yes LUCIANA Sanchez CNP   glucose-vitamin C 4-6 GM-MG CHEW chewable tablet Take 4 tablets by mouth as needed (low BS) Yes Oscar Setting, MD   Continuous Blood Gluc Sensor (FREESTYLE HENRRY 14 DAY SENSOR) MISC USE AS DIRECTED TO CHECK GLUCOSE Yes Historical Provider, MD   amLODIPine (NORVASC) 5 MG tablet TAKE 1 TABLET BY MOUTH EVERY DAY Yes Historical Provider, MD   potassium chloride (KLOR-CON M) 20 MEQ extended release tablet Take 1 tablet by mouth 3 times daily (with meals) for 10 days  Oscar Arauz MD       CareTeam (Including outside providers/suppliers regularly involved in providing care):   Patient Care Team:  LUCIANA Sanchez CNP as PCP - General (Family Nurse Practitioner)  LUCIANA Sanchez CNP as PCP - [facility] Empaneled Provider  Radha Sanon MD as Consulting Physician (Hematology and Oncology)     Reviewed and updated this visit:  Tobacco  Allergies  Meds  Problems  Med Hx  Surg Hx  Soc Hx  Fam Hx

## 2022-12-16 LAB — URINE CULTURE, ROUTINE: NORMAL

## 2023-01-04 RX ORDER — ACETAMINOPHEN 500 MG
500 TABLET ORAL EVERY 6 HOURS PRN
Qty: 360 TABLET | Refills: 1 | Status: SHIPPED | OUTPATIENT
Start: 2023-01-04

## 2023-01-11 DIAGNOSIS — F02.B11 MODERATE ALZHEIMER'S DEMENTIA WITH AGITATION, UNSPECIFIED TIMING OF DEMENTIA ONSET (HCC): ICD-10-CM

## 2023-01-11 DIAGNOSIS — G30.9 MODERATE ALZHEIMER'S DEMENTIA WITH AGITATION, UNSPECIFIED TIMING OF DEMENTIA ONSET (HCC): ICD-10-CM

## 2023-01-11 RX ORDER — RISPERIDONE 0.5 MG/1
0.5 TABLET ORAL 2 TIMES DAILY
Qty: 180 TABLET | Refills: 0 | Status: SHIPPED | OUTPATIENT
Start: 2023-01-11 | End: 2023-04-11

## 2023-01-12 ENCOUNTER — TELEPHONE (OUTPATIENT)
Dept: FAMILY MEDICINE CLINIC | Age: 78
End: 2023-01-12

## 2023-01-12 NOTE — TELEPHONE ENCOUNTER
Leah from [place] called:  Patient BS today-575  Baseline is she is confused  Not exhibiting tiredness, urine issues, or other symptoms  At dinner time BS runs high 400-500  Has never reached 575  review

## 2023-01-12 NOTE — TELEPHONE ENCOUNTER
If patient is acting her normal, recommend no change at this time. If patient has change in behavior then go to ER.

## 2023-01-13 ENCOUNTER — APPOINTMENT (OUTPATIENT)
Dept: CT IMAGING | Age: 78
End: 2023-01-13
Payer: MEDICARE

## 2023-01-13 ENCOUNTER — HOSPITAL ENCOUNTER (EMERGENCY)
Age: 78
Discharge: HOME | End: 2023-01-13
Payer: MEDICARE

## 2023-01-13 ENCOUNTER — APPOINTMENT (OUTPATIENT)
Dept: GENERAL RADIOLOGY | Age: 78
End: 2023-01-13
Payer: MEDICARE

## 2023-01-13 VITALS
SYSTOLIC BLOOD PRESSURE: 159 MMHG | TEMPERATURE: 97.9 F | HEART RATE: 81 BPM | OXYGEN SATURATION: 99 % | DIASTOLIC BLOOD PRESSURE: 69 MMHG | RESPIRATION RATE: 20 BRPM

## 2023-01-13 DIAGNOSIS — R73.9 HYPERGLYCEMIA: ICD-10-CM
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
## 2023-01-13 LAB
A/G RATIO: 1.2 (ref 1.1–2.2)
ALBUMIN SERPL-MCNC: 3.3 G/DL (ref 3.4–5)
ALP BLD-CCNC: 113 U/L (ref 40–129)
ALT SERPL-CCNC: 12 U/L (ref 10–40)
ANION GAP SERPL CALCULATED.3IONS-SCNC: 13 MMOL/L (ref 3–16)
AST SERPL-CCNC: 14 U/L (ref 15–37)
BASOPHILS ABSOLUTE: 0 K/UL (ref 0–0.2)
BASOPHILS RELATIVE PERCENT: 0.7 %
BILIRUB SERPL-MCNC: <0.2 MG/DL (ref 0–1)
BILIRUBIN URINE: NEGATIVE
BLOOD, URINE: NEGATIVE
BUN BLDV-MCNC: 28 MG/DL (ref 7–20)
CALCIUM SERPL-MCNC: 8.8 MG/DL (ref 8.3–10.6)
CHLORIDE BLD-SCNC: 102 MMOL/L (ref 99–110)
CLARITY: CLEAR
CO2: 19 MMOL/L (ref 21–32)
COLOR: YELLOW
CREAT SERPL-MCNC: 1 MG/DL (ref 0.6–1.2)
EOSINOPHILS ABSOLUTE: 0.1 K/UL (ref 0–0.6)
EOSINOPHILS RELATIVE PERCENT: 2 %
GFR SERPL CREATININE-BSD FRML MDRD: 57 ML/MIN/{1.73_M2}
GLUCOSE BLD-MCNC: 273 MG/DL (ref 70–99)
GLUCOSE BLD-MCNC: 348 MG/DL (ref 70–99)
GLUCOSE URINE: 500 MG/DL
HCT VFR BLD CALC: 35.4 % (ref 36–48)
HEMOGLOBIN: 11.5 G/DL (ref 12–16)
KETONES, URINE: NEGATIVE MG/DL
LACTIC ACID, SEPSIS: 2 MMOL/L (ref 0.4–1.9)
LACTIC ACID, SEPSIS: 3.1 MMOL/L (ref 0.4–1.9)
LEUKOCYTE ESTERASE, URINE: NEGATIVE
LYMPHOCYTES ABSOLUTE: 0.8 K/UL (ref 1–5.1)
LYMPHOCYTES RELATIVE PERCENT: 13.4 %
MCH RBC QN AUTO: 31.5 PG (ref 26–34)
MCHC RBC AUTO-ENTMCNC: 32.6 G/DL (ref 31–36)
MCV RBC AUTO: 96.7 FL (ref 80–100)
MICROSCOPIC EXAMINATION: ABNORMAL
MONOCYTES ABSOLUTE: 0.4 K/UL (ref 0–1.3)
MONOCYTES RELATIVE PERCENT: 7.3 %
NEUTROPHILS ABSOLUTE: 4.4 K/UL (ref 1.7–7.7)
NEUTROPHILS RELATIVE PERCENT: 76.6 %
NITRITE, URINE: NEGATIVE
PDW BLD-RTO: 13.3 % (ref 12.4–15.4)
PERFORMED ON: ABNORMAL
PH UA: 7.5 (ref 5–8)
PLATELET # BLD: 208 K/UL (ref 135–450)
PMV BLD AUTO: 8.4 FL (ref 5–10.5)
POTASSIUM SERPL-SCNC: 4.4 MMOL/L (ref 3.5–5.1)
PROCALCITONIN: 0.05 NG/ML (ref 0–0.15)
PROTEIN UA: NEGATIVE MG/DL
RBC # BLD: 3.66 M/UL (ref 4–5.2)
SODIUM BLD-SCNC: 134 MMOL/L (ref 136–145)
SPECIFIC GRAVITY UA: 1.01 (ref 1–1.03)
TOTAL PROTEIN: 6 G/DL (ref 6.4–8.2)
TROPONIN: <0.01 NG/ML
URINE REFLEX TO CULTURE: ABNORMAL
URINE TYPE: ABNORMAL
UROBILINOGEN, URINE: 0.2 E.U./DL
WBC # BLD: 5.8 K/UL (ref 4–11)

## 2023-01-13 PROCEDURE — 36415 COLL VENOUS BLD VENIPUNCTURE: CPT

## 2023-01-13 PROCEDURE — 85025 COMPLETE CBC W/AUTO DIFF WBC: CPT

## 2023-01-13 PROCEDURE — 84145 PROCALCITONIN (PCT): CPT

## 2023-01-13 PROCEDURE — 71045 X-RAY EXAM CHEST 1 VIEW: CPT

## 2023-01-13 PROCEDURE — 70450 CT HEAD/BRAIN W/O DYE: CPT

## 2023-01-13 PROCEDURE — 72125 CT NECK SPINE W/O DYE: CPT

## 2023-01-13 PROCEDURE — 99285 EMERGENCY DEPT VISIT HI MDM: CPT

## 2023-01-13 PROCEDURE — 84484 ASSAY OF TROPONIN QUANT: CPT

## 2023-01-13 PROCEDURE — 93005 ELECTROCARDIOGRAM TRACING: CPT | Performed by: PHYSICIAN ASSISTANT

## 2023-01-13 PROCEDURE — 2580000003 HC RX 258: Performed by: PHYSICIAN ASSISTANT

## 2023-01-13 PROCEDURE — 80053 COMPREHEN METABOLIC PANEL: CPT

## 2023-01-13 PROCEDURE — 83605 ASSAY OF LACTIC ACID: CPT

## 2023-01-13 PROCEDURE — 81003 URINALYSIS AUTO W/O SCOPE: CPT

## 2023-01-13 RX ORDER — 0.9 % SODIUM CHLORIDE 0.9 %
1000 INTRAVENOUS SOLUTION INTRAVENOUS ONCE
Status: COMPLETED | OUTPATIENT
Start: 2023-01-13 | End: 2023-01-13

## 2023-01-13 RX ADMIN — SODIUM CHLORIDE 1000 ML: 9 INJECTION, SOLUTION INTRAVENOUS at 21:15

## 2023-01-14 LAB
EKG ATRIAL RATE: 67 BPM
EKG DIAGNOSIS: NORMAL
EKG P AXIS: 60 DEGREES
EKG P-R INTERVAL: 148 MS
EKG Q-T INTERVAL: 448 MS
EKG QRS DURATION: 126 MS
EKG QTC CALCULATION (BAZETT): 473 MS
EKG R AXIS: -48 DEGREES
EKG T AXIS: 57 DEGREES
EKG VENTRICULAR RATE: 67 BPM

## 2023-01-14 PROCEDURE — 93010 ELECTROCARDIOGRAM REPORT: CPT | Performed by: INTERNAL MEDICINE

## 2023-01-14 ASSESSMENT — ENCOUNTER SYMPTOMS
RHINORRHEA: 0
ABDOMINAL PAIN: 0
COUGH: 0
NAUSEA: 0
SHORTNESS OF BREATH: 0
DIARRHEA: 0
VOMITING: 0

## 2023-01-14 NOTE — ED PROVIDER NOTES
The Ekg interpreted by me in the absence of a cardiologist shows. Normal sinus rhythm, ventricular rate 67. Left axis deviation. Left bundle branch block. No STEMI by Sgarbossa criteria. No evidence of acute ischemia.    No significant change from prior EKG dated 10/27/22         I only performed EKG interpretation and was not otherwise involved in the care of this patient.'     Stephon Flores MD  01/13/23 3010

## 2023-01-14 NOTE — ED PROVIDER NOTES
[facility]        Pt Name: Shanita Mccauley  MRN: [id number]  [place] 1945  Date of evaluation: 1/13/2023  Provider: Christine Bermudez PA-C  PCP: LUCIANA Hernández - CNP  Note Started: 12:07 AM EST 1/14/23      ESTEFANI. I have evaluated this patient. My supervising physician was available for consultation. CHIEF COMPLAINT       Chief Complaint   Patient presents with    Fall     Came by [facility] from [facility] from fall- unknown of when fall happened- can't answer questions appropriately- A&O x1- soiled upon arrival-  hx alzheimers       HISTORY OF PRESENT ILLNESS: 1 or more Elements     History From: Patient, son  Limitations to history : History of Alzheimer's and dementia    Shanita Mccauley is a 76 y.o. female who presents to the emergency department today for evaluation for a fall. The patient is from [facility], the patient does have a history of Alzheimer's, and dementia, she is alert and oriented x2. This is the patient's baseline. The patient states that she tripped while walking into the restroom, and son at bedside states that this is what the facility told him. He tells me that the patient is a diabetic, and he states that the sugars were running in the 400s yesterday. The patient did hit her head however there is no reports of any loss of consciousness, there is not been any vomiting. There is no fever or chills. No cough. Patient has been acting at baseline, and otherwise has not acted like she is in any pain. Nursing Notes were all reviewed and agreed with or any disagreements were addressed in the HPI. REVIEW OF SYSTEMS :      Review of Systems   Constitutional:  Negative for activity change, appetite change, chills and fever. HENT:  Negative for congestion and rhinorrhea. Respiratory:  Negative for cough and shortness of breath. Cardiovascular:  Negative for chest pain. Gastrointestinal:  Negative for abdominal pain, diarrhea, nausea and vomiting. Genitourinary:  Negative for difficulty urinating, dysuria and hematuria. Positives and Pertinent negatives as per HPI.      SURGICAL HISTORY     Past Surgical History:   Procedure Laterality Date    AXILLARY SURGERY Left     BREAST BIOPSY  10/5/12    BREAST LUMPECTOMY  11 6 12    with axillary nodedissection    CARDIAC SURGERY  2010    coronary angiogram-no blockage    CARPAL TUNNEL RELEASE      bilateral    COLONOSCOPY      EYE SURGERY Bilateral     Cataracts    HYSTERECTOMY (CERVIX STATUS UNKNOWN)      INSERTABLE CARDIAC MONITOR      LUMBAR SPINE SURGERY  04/14/2016    BILATERAL DECOMPRESSIVE LUMBAR LAMINECTOMY L4-L5, RIGHT       CURRENTMEDICATIONS       Discharge Medication List as of 1/13/2023 11:33 PM        CONTINUE these medications which have NOT CHANGED    Details   risperiDONE (RISPERDAL) 0.5 MG tablet Take 1 tablet by mouth 2 times daily, Disp-180 tablet, R-0Print      acetaminophen (TYLENOL) 500 MG tablet Take 1 tablet by mouth every 6 hours as needed for Pain or Fever, Disp-360 tablet, R-1Print      lisinopril-hydroCHLOROthiazide (PRINZIDE;ZESTORETIC) 20-12.5 MG per tablet Take 2 tablets by mouth daily, Disp-180 tablet, R-3Print      FLUoxetine (PROZAC) 10 MG tablet Take 1 tablet by mouth daily, Disp-90 tablet, R-3Print      Insulin Glargine, 2 Unit Dial, (TOUJEO MAX SOLOSTAR) 300 UNIT/ML SOPN Inject 20 Units into the skin at bedtime, Disp-3 Adjustable Dose Pre-filled Pen Syringe, R-3Normal      Insulin Pen Needle 32G X 6 MM MISC Disp-400 each, R-2, NormalUse with basal insulin and sliding scale insulin for SQ injections      glucose-vitamin C 4-6 GM-MG CHEW chewable tablet Take 4 tablets by mouth as needed (low BS), R-0OTC      potassium chloride (KLOR-CON M) 20 MEQ extended release tablet Take 1 tablet by mouth 3 times daily (with meals) for 10 days, Disp-30 tablet, R-0Normal      Continuous Blood Gluc Sensor ([facility]RRY 14 DAY SENSOR) MISC USE AS DIRECTED TO CHECK GLUCOSEHistorical Med      amLODIPine (NORVASC) 5 MG tablet TAKE 1 TABLET BY MOUTH EVERY DAYHistorical Med             ALLERGIES     Amoxicillin, Amoxicillin-pot clavulanate, Nitrofuran derivatives, and Bactrim    FAMILYHISTORY       Family History   Problem Relation Age of Onset    Heart Disease Mother     Cancer Father     Diabetes Other     High Cholesterol Neg Hx     High Blood Pressure Neg Hx         SOCIAL HISTORY       Social History     Tobacco Use    Smoking status: Former     Packs/day: 0.00     Years: 0.00     Pack years: 0.00     Types: Cigarettes     Quit date: 1984     Years since quittin.7    Smokeless tobacco: Never    Tobacco comments:     Stoppedsmoing in    Vaping Use    Vaping Use: Never used   Substance Use Topics    Alcohol use: No     Alcohol/week: 0.0 standard drinks    Drug use: No       SCREENINGS        Vanessa Coma Scale  Eye Opening: Spontaneous  Best Verbal Response: Confused  Best Motor Response: Withdraws from pain  [place]ena Coma Scale Score: 12                CIWA Assessment  BP: (!) 159/69  Heart Rate: 81           PHYSICAL EXAM  1 or more Elements     ED Triage Vitals [23 1816]   BP Temp Temp Source Heart Rate Resp SpO2 Height Weight   (!) 151/65 97.9 °F (36.6 °C) Oral 68 20 100 % -- --       Physical Exam  Vitals and nursing note reviewed. Constitutional:       Appearance: She is well-developed. She is not diaphoretic. HENT:      Head: Normocephalic and atraumatic. Comments: There is no fuentes sign or raccoon sign, no CSF rhinorrhea. Right Ear: External ear normal.      Left Ear: External ear normal.      Nose: Nose normal.   Eyes:      General:         Right eye: No discharge. Left eye: No discharge. Neck:      Trachea: No tracheal deviation. Cardiovascular:      Rate and Rhythm: Normal rate and regular rhythm.    Pulmonary:      Effort: Pulmonary effort is normal. No respiratory distress. Breath sounds: Normal breath sounds. No wheezing or rales. Abdominal:      General: Bowel sounds are normal. There is no distension. Palpations: Abdomen is soft. Tenderness: There is no abdominal tenderness. There is no guarding. Musculoskeletal:         General: Normal range of motion. Cervical back: Normal range of motion and neck supple. Comments: No midline spinal tenderness, no bony joint tenderness. Skin:     General: Skin is warm and dry. Neurological:      General: No focal deficit present. Mental Status: She is alert. Mental status is at baseline. Psychiatric:         Behavior: Behavior normal.           DIAGNOSTIC RESULTS   LABS:    Labs Reviewed   CBC WITH AUTO DIFFERENTIAL - Abnormal; Notable for the following components:       Result Value    RBC 3.66 (*)     Hemoglobin 11.5 (*)     Hematocrit 35.4 (*)     Lymphocytes Absolute 0.8 (*)     All other components within normal limits   COMPREHENSIVE METABOLIC PANEL - Abnormal; Notable for the following components:    Sodium 134 (*)     CO2 19 (*)     Glucose 348 (*)     BUN 28 (*)     Est, Glom Filt Rate 57 (*)     Total Protein 6.0 (*)     Albumin 3.3 (*)     AST 14 (*)     All other components within normal limits   URINALYSIS WITH REFLEX TO CULTURE - Abnormal; Notable for the following components:    Glucose, Ur 500 (*)     All other components within normal limits   LACTATE, SEPSIS - Abnormal; Notable for the following components:    Lactic Acid, Sepsis 2.0 (*)     All other components within normal limits   LACTATE, SEPSIS - Abnormal; Notable for the following components:    Lactic Acid, Sepsis 3.1 (*)     All other components within normal limits   POCT GLUCOSE - Abnormal; Notable for the following components:    POC Glucose 273 (*)     All other components within normal limits   TROPONIN   PROCALCITONIN       When ordered only abnormal lab results are displayed.  All other labs were within normal range or not returned as of this dictation. EKG: When ordered, EKG's are interpreted by the Emergency Department Physician in the absence of a cardiologist.  Please see their note for interpretation of EKG. RADIOLOGY:   Non-plain film images such as CT, Ultrasound and MRI are read by the radiologist. Plain radiographic images are visualized and preliminarily interpreted by the ED Provider with the below findings:        Interpretation per the Radiologist below, if available at the time of this note:    CT CERVICAL SPINE WO CONTRAST   Final Result   No acute intracranial abnormality. Redemonstration of CSF filled left   temporal/frontal area which appears stable and chronic. No acute cervical spine abnormality. Moderate multilevel degenerative disc   disease. Stable mild anterolisthesis of C7 over T1, likely degenerative in   nature. CT HEAD WO CONTRAST   Final Result   No acute intracranial abnormality. Redemonstration of CSF filled left   temporal/frontal area which appears stable and chronic. No acute cervical spine abnormality. Moderate multilevel degenerative disc   disease. Stable mild anterolisthesis of C7 over T1, likely degenerative in   nature. XR CHEST PORTABLE   Final Result   No acute cardiopulmonary findings           CT HEAD WO CONTRAST    Result Date: 1/13/2023  EXAMINATION: CT OF THE HEAD WITHOUT CONTRAST; CT OF THE CERVICAL SPINE WITHOUT CONTRAST 1/13/2023 7:15 pm TECHNIQUE: CT of the head was performed without the administration of intravenous contrast. Automated exposure control, iterative reconstruction, and/or weight based adjustment of the mA/kV was utilized to reduce the radiation dose to as low as reasonably achievable.; CT of the cervical spine was performed without the administration of intravenous contrast. Multiplanar reformatted images are provided for review.  Automated exposure control, iterative reconstruction, and/or weight based adjustment of the mA/kV was utilized to reduce the radiation dose to as low as reasonably achievable. COMPARISON: 10/27/2022 HISTORY: ORDERING SYSTEM PROVIDED HISTORY: fall TECHNOLOGIST PROVIDED HISTORY: Reason for exam:->fall Has a \"code stroke\" or \"stroke alert\" been called? ->No Decision Support Exception - unselect if not a suspected or confirmed emergency medical condition->Emergency Medical Condition (MA) Reason for Exam: Fall (Came by [email or web address] from [facility] from fall- unknown of when fall happened- can't answer questions appropriately- A&O x1- soiled upon arrival-  hx alzheimers); ORDERING SYSTEM PROVIDED HISTORY: fall TECHNOLOGIST PROVIDED HISTORY: Reason for exam:->fall Decision Support Exception - unselect if not a suspected or confirmed emergency medical condition->Emergency Medical Condition (MA) Reason for Exam: Fall (Came by [email or web address] from [facility] from fall- unknown of when fall happened- can't answer questions appropriately- A&O x1- soiled upon arrival-  hx alzheimers) FINDINGS: CT HEAD: BRAIN/VENTRICLES: There is no acute intracranial hemorrhage, mass effect or midline shift. No abnormal extra-axial fluid collection. The gray-white differentiation is maintained without evidence of an acute infarct. There is no evidence of hydrocephalus. Redemonstration of CSF filled area in the left temporal and frontal lobes which appears stable. ORBITS: The visualized portion of the orbits demonstrate no acute abnormality. SINUSES: The visualized paranasal sinuses and mastoid air cells demonstrate no acute abnormality. SOFT TISSUES/SKULL:  No acute abnormality of the visualized skull or soft tissues. CT CERVICAL SPINE: The overall lateral alignment of the cervical spine appears stable. C1-C2 and odontoid appear unremarkable. Stable mild anterolisthesis of C7 over T1. Moderate multilevel degenerative disc disease. Spinous processes and posterior elements appear intact.      No acute intracranial abnormality. Redemonstration of CSF filled left temporal/frontal area which appears stable and chronic. No acute cervical spine abnormality. Moderate multilevel degenerative disc disease. Stable mild anterolisthesis of C7 over T1, likely degenerative in nature. CT CERVICAL SPINE WO CONTRAST    Result Date: 1/13/2023  EXAMINATION: CT OF THE HEAD WITHOUT CONTRAST; CT OF THE CERVICAL SPINE WITHOUT CONTRAST 1/13/2023 7:15 pm TECHNIQUE: CT of the head was performed without the administration of intravenous contrast. Automated exposure control, iterative reconstruction, and/or weight based adjustment of the mA/kV was utilized to reduce the radiation dose to as low as reasonably achievable.; CT of the cervical spine was performed without the administration of intravenous contrast. Multiplanar reformatted images are provided for review. Automated exposure control, iterative reconstruction, and/or weight based adjustment of the mA/kV was utilized to reduce the radiation dose to as low as reasonably achievable. COMPARISON: 10/27/2022 HISTORY: ORDERING SYSTEM PROVIDED HISTORY: fall TECHNOLOGIST PROVIDED HISTORY: Reason for exam:->fall Has a \"code stroke\" or \"stroke alert\" been called? ->No Decision Support Exception - unselect if not a suspected or confirmed emergency medical condition->Emergency Medical Condition (MA) Reason for Exam: Fall (Came by [email or web address] from [employer] from fall- unknown of when fall happened- can't answer questions appropriately- A&O x1- soiled upon arrival-  hx alzheimers); ORDERING SYSTEM PROVIDED HISTORY: fall TECHNOLOGIST PROVIDED HISTORY: Reason for exam:->fall Decision Support Exception - unselect if not a suspected or confirmed emergency medical condition->Emergency Medical Condition (MA) Reason for Exam: Fall (Came by [email or web address] from [facility] from fall- unknown of when fall happened- can't answer questions appropriately- A&O x1- soiled upon arrival-  hx alzheimers) FINDINGS: CT HEAD: BRAIN/VENTRICLES: There is no acute intracranial hemorrhage, mass effect or midline shift. No abnormal extra-axial fluid collection. The gray-white differentiation is maintained without evidence of an acute infarct. There is no evidence of hydrocephalus. Redemonstration of CSF filled area in the left temporal and frontal lobes which appears stable. ORBITS: The visualized portion of the orbits demonstrate no acute abnormality. SINUSES: The visualized paranasal sinuses and mastoid air cells demonstrate no acute abnormality. SOFT TISSUES/SKULL:  No acute abnormality of the visualized skull or soft tissues. CT CERVICAL SPINE: The overall lateral alignment of the cervical spine appears stable. C1-C2 and odontoid appear unremarkable. Stable mild anterolisthesis of C7 over T1. Moderate multilevel degenerative disc disease. Spinous processes and posterior elements appear intact. No acute intracranial abnormality. Redemonstration of CSF filled left temporal/frontal area which appears stable and chronic. No acute cervical spine abnormality. Moderate multilevel degenerative disc disease. Stable mild anterolisthesis of C7 over T1, likely degenerative in nature. XR CHEST PORTABLE    Result Date: 1/13/2023  EXAMINATION: ONE XRAY VIEW OF THE CHEST 1/13/2023 7:34 pm COMPARISON: None. HISTORY: ORDERING SYSTEM PROVIDED HISTORY: fall TECHNOLOGIST PROVIDED HISTORY: Reason for exam:->fall Reason for Exam: fall FINDINGS: Normal cardiomediastinal silhouette. No acute airspace infiltrate. No pneumothorax or pleural effusion     No acute cardiopulmonary findings       No results found.     PROCEDURES   Unless otherwise noted below, none     Procedures    CRITICAL CARE TIME (.cctime)       PAST MEDICAL HISTORY      has a past medical history of Arthritis, Cancer ([address] Cataract, Crohn disease ([address] Dementia ([address] Dental bridge present, Dental crowns present, Diabetes mellitus ([address] Encounter for loop recorder at end of battery life (11/17/2014), Goiter, Hearing impairment, Herniated lumbar intervertebral disc (4/14/2016), History of pancreatitis (01/01/1976), Hypercholesteremia, Hypertension, IBS (irritable bowel syndrome), LBBB (left bundle branch block), Osteoarthritis of foot (3/21/2014), Recurrent UTI, Right-sided low back pain with right-sided sciatica (3/23/2016), Rosacea (1/28/2015), Spinal stenosis, Tibialis posterior tendinitis (3/25/2013), and Type II or unspecified type diabetes mellitus without mention of complication, not stated as uncontrolled. EMERGENCY DEPARTMENT COURSE and DIFFERENTIAL DIAGNOSIS/MDM:   Vitals:    Vitals:    01/13/23 2030 01/13/23 2100 01/13/23 2115 01/13/23 2145   BP: (!) 150/61 (!) 159/72 (!) 156/72 (!) 159/69   Pulse:  81     Resp:       Temp:       TempSrc:       SpO2: 97% 99% 99% 99%       Patient was given the following medications:  Medications   0.9 % sodium chloride bolus (0 mLs IntraVENous Stopped 1/13/23 2332)             Is this patient to be included in the SEP-1 Core Measure due to severe sepsis or septic shock? No   Exclusion criteria - the patient is NOT to be included for SEP-1 Core Measure due to:   Infection is not suspected    Chronic Conditions affecting care:  has a past medical history of Arthritis, Cancer ([address] Cataract, Crohn disease ([address] Dementia ([address] Dental bridge present, Dental crowns present, Diabetes mellitus ([address] Encounter for loop recorder at end of battery life (11/17/2014), Goiter, Hearing impairment, Herniated lumbar intervertebral disc (4/14/2016), History of pancreatitis (01/01/1976), Hypercholesteremia, Hypertension, IBS (irritable bowel syndrome), LBBB (left bundle branch block), Osteoarthritis of foot (3/21/2014), Recurrent UTI, Right-sided low back pain with right-sided sciatica (3/23/2016), Rosacea (1/28/2015), Spinal stenosis, Tibialis posterior tendinitis (3/25/2013), and Type II or unspecified type diabetes mellitus without mention of complication, not stated as uncontrolled. CONSULTS: (Who and What was discussed)  None      Social Determinants : None    Records Reviewed (Source): None    CC/HPI Summary, DDx, ED Course, and Reassessment: Briefly, this is a 78-year-old female who presents to the emergency department today for evaluation for a fall. The patient does have history of dementia, she alert and oriented x2, is otherwise acting at her baseline. Patient had a trip, and fall, no loss of conscious or vomiting. With the patient was to the ED she is alert and oriented x2, there are no focal neurological deficits, head is atraumatic, son tells me that the patient's blood sugar was running elevated yesterday, was in the 400s    Disposition Considerations (tests considered but not done, Admit vs D/C, Shared Decision Making, Pt Expectation of Test or Tx.):    Due to the report of the patient's fall, history of dementia, and history of hyperglycemia, lab work, and imaging will be obtained. EKG is documented by my attending, please see his note for further details. CBC shows no evidence of leukocytosis, stable anemia. CMP does show glucose of 348, however there is no anion gap. CO2 is normal, urinalysis, shows no evidence of ketonuria. Troponin negative, procalcitonin is normal.  Lactic acid is elevated at 3.1 which could certainly be due to dehydration, there is no source of any infection and I do not suspect sepsis. Repeat lactic acid is trending downward and is now normalized. Patient was given IV fluids, glucose is trending down to 273, the patient otherwise has no anion gap, and is otherwise acting at baseline, do not suspect DKA. CT of head and cervical spine    Fall was reported to be mechanical in nature, she has been acting at her baseline, work-up here is relatively unremarkable, I feel that she can be managed as an outpatient.   Son is updated, agreeable with plan, patient is stable for discharge back to the nursing home facility.    Results for orders placed or performed during the hospital encounter of 01/13/23   CBC with Auto Differential   Result Value Ref Range    WBC 5.8 4.0 - 11.0 K/uL    RBC 3.66 (L) 4.00 - 5.20 M/uL    Hemoglobin 11.5 (L) 12.0 - 16.0 g/dL    Hematocrit 35.4 (L) 36.0 - 48.0 %    MCV 96.7 80.0 - 100.0 fL    MCH 31.5 26.0 - 34.0 pg    MCHC 32.6 31.0 - 36.0 g/dL    RDW 13.3 12.4 - 15.4 %    Platelets 745 741 - 873 K/uL    MPV 8.4 5.0 - 10.5 fL    Neutrophils % 76.6 %    Lymphocytes % 13.4 %    Monocytes % 7.3 %    Eosinophils % 2.0 %    Basophils % 0.7 %    Neutrophils Absolute 4.4 1.7 - 7.7 K/uL    Lymphocytes Absolute 0.8 (L) 1.0 - 5.1 K/uL    Monocytes Absolute 0.4 0.0 - 1.3 K/uL    Eosinophils Absolute 0.1 0.0 - 0.6 K/uL    Basophils Absolute 0.0 0.0 - 0.2 K/uL   Comprehensive Metabolic Panel   Result Value Ref Range    Sodium 134 (L) 136 - 145 mmol/L    Potassium 4.4 3.5 - 5.1 mmol/L    Chloride 102 99 - 110 mmol/L    CO2 19 (L) 21 - 32 mmol/L    Anion Gap 13 3 - 16    Glucose 348 (H) 70 - 99 mg/dL    BUN 28 (H) 7 - 20 mg/dL    Creatinine 1.0 0.6 - 1.2 mg/dL    Est, Glom Filt Rate 57 (A) >60    Calcium 8.8 8.3 - 10.6 mg/dL    Total Protein 6.0 (L) 6.4 - 8.2 g/dL    Albumin 3.3 (L) 3.4 - 5.0 g/dL    Albumin/Globulin Ratio 1.2 1.1 - 2.2    Total Bilirubin <0.2 0.0 - 1.0 mg/dL    Alkaline Phosphatase 113 40 - 129 U/L    ALT 12 10 - 40 U/L    AST 14 (L) 15 - 37 U/L   Urinalysis with Reflex to Culture    Specimen: Urine   Result Value Ref Range    Color, UA Yellow Straw/Yellow    Clarity, UA Clear Clear    Glucose, Ur 500 (A) Negative mg/dL    Bilirubin Urine Negative Negative    Ketones, Urine Negative Negative mg/dL    Specific Gravity, UA 1.015 1.005 - 1.030    Blood, Urine Negative Negative    pH, UA 7.5 5.0 - 8.0    Protein, UA Negative Negative mg/dL    Urobilinogen, Urine 0.2 <2.0 E.U./dL    Nitrite, Urine Negative Negative    Leukocyte Esterase, Urine Negative Negative    Microscopic Examination Not Indicated     Urine Type Voided     Urine Reflex to Culture Not Indicated    Troponin   Result Value Ref Range    Troponin <0.01 <0.01 ng/mL   Procalcitonin   Result Value Ref Range    Procalcitonin 0.05 0.00 - 0.15 ng/mL   Lactate, Sepsis   Result Value Ref Range    Lactic Acid, Sepsis 2.0 (H) 0.4 - 1.9 mmol/L   Lactate, Sepsis   Result Value Ref Range    Lactic Acid, Sepsis 3.1 (H) 0.4 - 1.9 mmol/L   POCT Glucose   Result Value Ref Range    POC Glucose 273 (H) 70 - 99 mg/dl    Performed on ACCU-CHEK    EKG 12 Lead   Result Value Ref Range    Ventricular Rate 67 BPM    Atrial Rate 67 BPM    P-R Interval 148 ms    QRS Duration 126 ms    Q-T Interval 448 ms    QTc Calculation (Bazett) 473 ms    P Axis 60 degrees    R Axis -48 degrees    T Axis 57 degrees    Diagnosis       Normal sinus rhythmLeft axis deviationLeft bundle branch blockAbnormal ECG       I estimate there is LOW risk for SUBARACHNOID HEMORRHAGE, MENINGITIS, INTRACRANIAL HEMORRHAGE, SUBDURAL HEMATOMA, OR STROKE, thus I consider the discharge disposition reasonable. Fan Singh and VINICIO have discussed the diagnosis and risks, and we agree with discharging home to follow-up with their primary doctor. We also discussed returning to the Emergency Department immediately if new or worsening symptoms occur. We have discussed the symptoms which are most concerning (e.g., changing or worsening pain, weakness, vomiting, fever) that necessitate immediate return. Final Impression    1. Fall, initial encounter    2. Hyperglycemia        Discharge Vital Signs:  Blood pressure (!) 159/69, pulse 81, temperature 97.9 °F (36.6 °C), temperature source Oral, resp. rate 20, SpO2 99 %. I am the Primary Clinician of Record. FINAL IMPRESSION      1. Fall, initial encounter    2. Hyperglycemia          DISPOSITION/PLAN     DISPOSITION Decision To Discharge 01/13/2023 11:27:09 PM      PATIENT REFERRED TO:  Aleida Bland, APRN - CNP  [address] [address]  [phone]    Schedule an appointment as soon as possible for a visit       [facility] Emergency Department  [address] [facility]  [phone]    As needed, If symptoms worsen      DISCHARGE MEDICATIONS:  Discharge Medication List as of 1/13/2023 11:33 PM          DISCONTINUED MEDICATIONS:  Discharge Medication List as of 1/13/2023 11:33 PM                 (Please note that portions of this note were completed with a voice recognition program.  Efforts were made to edit the dictations but occasionally words are mis-transcribed.)    La Kimball PA-C (electronically signed)       La Kimball PA-C  01/14/23 0017

## 2023-01-14 NOTE — ED NOTES
Attempted to call Kallie diaz for report, no answer. Pt son received DC instructions, wheeled pt out to car and assisted into car.      [address] M[address] RN  01/13/23 4026

## 2023-01-31 ENCOUNTER — TELEPHONE (OUTPATIENT)
Dept: FAMILY MEDICINE CLINIC | Age: 78
End: 2023-01-31

## 2023-01-31 DIAGNOSIS — M25.562 CHRONIC PAIN OF BOTH KNEES: Primary | ICD-10-CM

## 2023-01-31 DIAGNOSIS — M25.561 CHRONIC PAIN OF BOTH KNEES: Primary | ICD-10-CM

## 2023-01-31 DIAGNOSIS — G89.29 CHRONIC PAIN OF BOTH KNEES: Primary | ICD-10-CM

## 2023-01-31 RX ORDER — SENNOSIDES 8.6 MG
650 CAPSULE ORAL 2 TIMES DAILY
Qty: 90 TABLET | Refills: 5 | Status: SHIPPED | OUTPATIENT
Start: 2023-01-31

## 2023-02-13 ENCOUNTER — PATIENT MESSAGE (OUTPATIENT)
Dept: FAMILY MEDICINE CLINIC | Age: 78
End: 2023-02-13

## 2023-02-13 DIAGNOSIS — G89.29 CHRONIC PAIN OF BOTH KNEES: Primary | ICD-10-CM

## 2023-02-13 DIAGNOSIS — M25.561 CHRONIC PAIN OF BOTH KNEES: Primary | ICD-10-CM

## 2023-02-13 DIAGNOSIS — M25.562 CHRONIC PAIN OF BOTH KNEES: Primary | ICD-10-CM

## 2023-02-13 NOTE — TELEPHONE ENCOUNTER
From: Akash Or  To: Sheila Sherman  Sent: 2/13/2023 4:17 PM EST  Subject: Knees    Hi Munoztalita MackenzieDeedee's knee seems to be getting worse. Some swelling and she having a hard time getting up. Can you refer her to an ortho ?    Thanks,  Vyatta

## 2023-02-23 ENCOUNTER — TELEPHONE (OUTPATIENT)
Dept: FAMILY MEDICINE CLINIC | Age: 78
End: 2023-02-23

## 2023-02-23 NOTE — TELEPHONE ENCOUNTER
Eye is red or eyelids are red? If eye itself-and no pain or vision changes- likely ruptured blood vessel, no concern, will resolve on it's own.  If pain or vision changes present, needs to see Optometrist    If eyelids red and swollen- can prescribe ointment to treat for pink eye

## 2023-02-23 NOTE — TELEPHONE ENCOUNTER
Ajit from [place] called re: damaris  Rt.  Eye blood red this am  No edema, no swelling  Not sure if patient was rubbing  Patient is not complaining of pain, etc  Review and contact Kelly Madsen

## 2023-02-28 ENCOUNTER — OFFICE VISIT (OUTPATIENT)
Dept: ORTHOPEDIC SURGERY | Age: 78
End: 2023-02-28
Payer: MEDICARE

## 2023-02-28 VITALS — RESPIRATION RATE: 16 BRPM | WEIGHT: 184 LBS | HEIGHT: 66 IN | BODY MASS INDEX: 29.57 KG/M2

## 2023-02-28 DIAGNOSIS — M25.561 CHRONIC PAIN OF BOTH KNEES: Primary | ICD-10-CM

## 2023-02-28 DIAGNOSIS — G89.29 CHRONIC PAIN OF BOTH KNEES: Primary | ICD-10-CM

## 2023-02-28 DIAGNOSIS — M25.562 CHRONIC PAIN OF BOTH KNEES: Primary | ICD-10-CM

## 2023-02-28 PROCEDURE — 99203 OFFICE O/P NEW LOW 30 MIN: CPT | Performed by: ORTHOPAEDIC SURGERY

## 2023-02-28 PROCEDURE — 1123F ACP DISCUSS/DSCN MKR DOCD: CPT | Performed by: ORTHOPAEDIC SURGERY

## 2023-02-28 NOTE — PROGRESS NOTES
CHIEF COMPLAINT: Bilateral knee pain    History:   Mahnaz Larkin is a 76 y.o. female referred by LUCIANA Turner CNP for evaluation and treatment of bilateral knee pain / injury. She has a history of dementia. She is a poor historian. She is seen with her best friend, who states that she attends all her doctors appointments with her. Her friend provides most of the history. She is a resident at [facility]. The patient complains of right greater than left knee pain. This is evaluated as a personal injury. The pain began about 1 month ago. Rates pain 3-10/10. There was not a history of injury. However, she has had multiple falls. She points to pain being located mostly anteriorly. Her knees have given out  The patient can bend and straighten the knee fully. There is no swelling in the knee. There was not catching / locking of the knee. The patient has not had PT. The patient has not had an injection. The patient has not taken NSAIDs. She has taken Tylenol. The patient has not tried ice.         Past Medical History:   Diagnosis Date    Arthritis     hands, spine    Cancer ([address]     LEFT BREAST    Cataract     Crohn disease ([address]     POSSIBLE DIAGNOSIS    Dementia ([address]     Dental bridge present     lower permanent    Dental crowns present     Venear upper anterior    Diabetes mellitus ([address]     IDDM    Encounter for loop recorder at end of battery life 11/17/2014    Pt has [employer]tr[employer] Linq implant     Goiter     Hearing impairment     severe, secondary to Meniere's disease    Herniated lumbar intervertebral disc 4/14/2016    History of pancreatitis 01/01/1976    Hypercholesteremia     Hypertension     IBS (irritable bowel syndrome)     LBBB (left bundle branch block)     Osteoarthritis of foot 3/21/2014    Recurrent UTI     Right-sided low back pain with right-sided sciatica 3/23/2016    Rosacea 1/28/2015    Spinal stenosis     Tibialis posterior tendinitis 3/25/2013    Type II or unspecified type diabetes mellitus without mention of complication, not stated as uncontrolled        Past Surgical History:   Procedure Laterality Date    AXILLARY SURGERY Left     BREAST BIOPSY  10/5/12    BREAST LUMPECTOMY  12    with axillary nodedissection    CARDIAC SURGERY      coronary angiogram-no blockage    CARPAL TUNNEL RELEASE      bilateral    COLONOSCOPY      EYE SURGERY Bilateral     Cataracts    HYSTERECTOMY (CERVIX STATUS UNKNOWN)      INSERTABLE CARDIAC MONITOR      LUMBAR SPINE SURGERY  2016    BILATERAL DECOMPRESSIVE LUMBAR LAMINECTOMY L4-L5, RIGHT       Family History   Problem Relation Age of Onset    Heart Disease Mother     Cancer Father     Diabetes Other     High Cholesterol Neg Hx     High Blood Pressure Neg Hx        Social History     Socioeconomic History    Marital status:      Number of children: 2   Occupational History    Occupation: Retired   Tobacco Use    Smoking status: Former     Packs/day: 0.00     Years: 0.00     Pack years: 0.00     Types: Cigarettes     Quit date: 1984     Years since quittin.9    Smokeless tobacco: Never    Tobacco comments:     Stoppedsmoing in    Vaping Use    Vaping Use: Never used   Substance and Sexual Activity    Alcohol use: No     Alcohol/week: 0.0 standard drinks    Drug use: No     Social Determinants of Health     Financial Resource Strain: Low Risk     Difficulty of Paying Living Expenses: Not hard at all   Food Insecurity: No Food Insecurity    Worried About [address] [place] [address] in the Last Year: Never true    Ran Out of Food in the Last Year: Never true   Physical Activity: Inactive    Days of Exercise per Week: 0 days    Minutes of Exercise per Session: 0 min       Current Outpatient Medications   Medication Sig Dispense Refill    acetaminophen (TYLENOL) 650 MG extended release tablet Take 1 tablet by mouth in the morning and at bedtime 90 tablet 5    risperiDONE (RISPERDAL) 0.5 MG tablet Take 1 tablet by mouth 2 times daily 180 tablet 0    lisinopril-hydroCHLOROthiazide (PRINZIDE;ZESTORETIC) 20-12.5 MG per tablet Take 2 tablets by mouth daily 180 tablet 3    FLUoxetine (PROZAC) 10 MG tablet Take 1 tablet by mouth daily 90 tablet 3    Insulin Glargine, 2 Unit Dial, (TOUJEO MAX SOLOSTAR) 300 UNIT/ML SOPN Inject 20 Units into the skin at bedtime 3 Adjustable Dose Pre-filled Pen Syringe 3    Insulin Pen Needle 32G X 6 MM MISC Use with basal insulin and sliding scale insulin for SQ injections 400 each 2    glucose-vitamin C 4-6 GM-MG CHEW chewable tablet Take 4 tablets by mouth as needed (low BS)  0    potassium chloride (KLOR-CON M) 20 MEQ extended release tablet Take 1 tablet by mouth 3 times daily (with meals) for 10 days 30 tablet 0    Continuous Blood Gluc Sensor (VT SiliconSTYLE HENRRY 14 DAY SENSOR) MISC USE AS DIRECTED TO CHECK GLUCOSE      amLODIPine (NORVASC) 5 MG tablet TAKE 1 TABLET BY MOUTH EVERY DAY       No current facility-administered medications for this visit. Allergies   Allergen Reactions    Amoxicillin Diarrhea    Amoxicillin-Pot Clavulanate Diarrhea    Nitrofuran Derivatives Other (See Comments)     Causes severe abdominal cramping    Bactrim Rash         Physical Examination:     Vital signs:   Resp 16   Ht 5' 6\" (1.676 m)   Wt 184 lb (83.5 kg)   BMI 29.70 kg/m²     General:  alert, appears stated age, cooperative, and no distress   Gait:  Normal. The patient  bear weight on the injured extremity. She is using a rolling walker.      Right Knee  Alignment:  neutral   ROM:  5 degrees extension to 110 degrees flexion   Left knee: 0 degrees extension, 110 flexion   Crepitus:  no   Joint Tenderness:  Global bilateral   Effusion:   0 cc   Patellar excursion:  2 of 4 quadrants bilateral   Patellar tilt test:  Positive bilateral   Patellar facet tenderness:  positive medial bilateral   positive lateral bilateral   Anterior drawer test:  negative   Left knee: negative   Posterior drawer:   negative Left knee: negative   Varus laxity at 30 degrees:  negative   Left knee: negative   Valgus laxity at 30 degrees:   negative   Left knee: negative     There is not any cellulitis, lymphedema or cutaneous lesions noted in the lower extremities. Motor exam of the lower extremities show quadriceps, hamstrings, foot dorsiflexion and plantarflexion grossly intact. Sensation to both feet is grossly intact to light touch.  The bilateral lower extremities are warm and well-perfused with brisk capillary refill.      Imaging:  Bilateral Knee X-Ray: 3 views obtained and reviewed. No fracture, dislocation, lytic lesion.  Severe patellofemoral narrowing on the left, moderate to severe on the right.  Maintained tibiofemoral joint spaces.      Assessment:     Bilateral knee patellofemoral arthritis  Alzheimer's dementia   Diabetes  Hearing impairment  Hypertension      Plan:     Natural history and expected course discussed. Questions answered.     Non surgical options including cortisone injection, Visco supplementation injection, Coolief (cooled frequency ablation of the geniculate nerves), stem cell injections, PRP injections were discussed. Surgical option of total knee arthroplasty discussed.    Ice prn.    NSAIDs prn.  She will check with her PCP to see if this is okay.  The patient was advised that NSAID-type medications have two very important potential side effects: gastrointestinal irritation including hemorrhage and renal injuries. She was asked to take the medication with food and to stop if she experiences any GI upset. I asked her to call for vomiting, abdominal pain or black/bloody stools. The patient expresses understanding of these issues and questions were answered.    Continue Tylenol as needed.    Discussed corticosteroid injection.  She deferred at this time.    Knee strengthening. Consider physical therapy for strengthening.    AAOS knee arthritis handout provided.    Follow up as needed for injection.          Kirill Valle. Montez Pierce MD  Orthopaedic Surgery and Sports Medicine     Disclaimer: This note was generated with use of a verbal recognition program and an attempt was made to check for errors. It is possible that there are still dictated errors within this office note. If so, please bring any significant errors to my attention for an addendum. All efforts were made to ensure that this office note is accurate.

## 2023-02-28 NOTE — LETTER
February 28, 2023       Jorden New YOB: 1945   [id number] [address] [facility] [address] Date of Visit:  2/28/2023       To Whom It May Concern: It is my medical opinion that Mynor Judd follow the plan of care outlined below regarding her bilateral knees:  Natural history and expected course discussed. Questions answered.      Non surgical options including cortisone injection, Visco supplementation injection, Coolief (cooled frequency ablation of the geniculate nerves), stem cell injections, PRP injections were discussed. Surgical option of total knee arthroplasty discussed.     Ice prn.     NSAIDs prn. She will check with her PCP to see if this is okay. The patient was advised that NSAID-type medications have two very important potential side effects: gastrointestinal irritation including hemorrhage and renal injuries. She was asked to take the medication with food and to stop if she experiences any GI upset. I asked her to call for vomiting, abdominal pain or black/bloody stools. The patient expresses understanding of these issues and questions were answered.     Continue Tylenol as needed.     Discussed corticosteroid injection. She deferred at this time.     Knee strengthening. Consider physical therapy for strengthening.     AAOS knee arthritis handout provided.     Follow up as needed for injection. If you have any questions or concerns, please don't hesitate to call.     Sincerely,    Memo Rausch MD

## 2023-03-22 ENCOUNTER — OFFICE VISIT (OUTPATIENT)
Dept: FAMILY MEDICINE CLINIC | Age: 78
End: 2023-03-22
Payer: MEDICARE

## 2023-03-22 VITALS
OXYGEN SATURATION: 99 % | TEMPERATURE: 97.2 F | WEIGHT: 194 LBS | HEART RATE: 88 BPM | HEIGHT: 66 IN | SYSTOLIC BLOOD PRESSURE: 142 MMHG | RESPIRATION RATE: 20 BRPM | BODY MASS INDEX: 31.18 KG/M2 | DIASTOLIC BLOOD PRESSURE: 80 MMHG

## 2023-03-22 DIAGNOSIS — Z79.4 TYPE 2 DIABETES MELLITUS WITHOUT COMPLICATION, WITH LONG-TERM CURRENT USE OF INSULIN (HCC): Primary | ICD-10-CM

## 2023-03-22 DIAGNOSIS — E11.9 TYPE 2 DIABETES MELLITUS WITHOUT COMPLICATION, WITH LONG-TERM CURRENT USE OF INSULIN (HCC): Primary | ICD-10-CM

## 2023-03-22 DIAGNOSIS — L30.9 ECZEMA, UNSPECIFIED TYPE: ICD-10-CM

## 2023-03-22 DIAGNOSIS — F02.B11 MODERATE ALZHEIMER'S DEMENTIA WITH AGITATION, UNSPECIFIED TIMING OF DEMENTIA ONSET (HCC): ICD-10-CM

## 2023-03-22 DIAGNOSIS — G30.9 MODERATE ALZHEIMER'S DEMENTIA WITH AGITATION, UNSPECIFIED TIMING OF DEMENTIA ONSET (HCC): ICD-10-CM

## 2023-03-22 PROBLEM — E87.6 HYPOKALEMIA: Status: RESOLVED | Noted: 2022-10-28 | Resolved: 2023-03-22

## 2023-03-22 PROBLEM — R39.9 UTI SYMPTOMS: Status: RESOLVED | Noted: 2022-12-15 | Resolved: 2023-03-22

## 2023-03-22 PROBLEM — R41.82 ALTERED MENTAL STATUS: Status: RESOLVED | Noted: 2022-10-27 | Resolved: 2023-03-22

## 2023-03-22 LAB — HBA1C MFR BLD: 8.9 %

## 2023-03-22 PROCEDURE — 3077F SYST BP >= 140 MM HG: CPT | Performed by: NURSE PRACTITIONER

## 2023-03-22 PROCEDURE — 99214 OFFICE O/P EST MOD 30 MIN: CPT | Performed by: NURSE PRACTITIONER

## 2023-03-22 PROCEDURE — 83036 HEMOGLOBIN GLYCOSYLATED A1C: CPT | Performed by: NURSE PRACTITIONER

## 2023-03-22 PROCEDURE — 3052F HG A1C>EQUAL 8.0%<EQUAL 9.0%: CPT | Performed by: NURSE PRACTITIONER

## 2023-03-22 PROCEDURE — 3079F DIAST BP 80-89 MM HG: CPT | Performed by: NURSE PRACTITIONER

## 2023-03-22 PROCEDURE — 1123F ACP DISCUSS/DSCN MKR DOCD: CPT | Performed by: NURSE PRACTITIONER

## 2023-03-22 ASSESSMENT — PATIENT HEALTH QUESTIONNAIRE - PHQ9: DEPRESSION UNABLE TO ASSESS: FUNCTIONAL CAPACITY MOTIVATION LIMITS ACCURACY

## 2023-03-22 NOTE — LETTER
March 22, 2023       [employer] YOB: 1945   [id number] [address] [facility] [address] Date of Visit:  3/22/2023       To Whom It May Concern:    Ayaka Lott was seen in my office on 3/22/2023. Medication changes: discontinue Melatonin    Titrate Toujeo insulin by 2 units every 2 nights until you reach 30 units. 3/22/23 & 3/23/23 give 22 units nightly. 3/24/23 & 3/25/23 give 24 units nightly. 3/26 & 3/27 give 26 units nightly. 3/28 & 3/29 give 28 units nightly. Starting 3/30/23 give 30 units that night and thereafter. If you have any questions or concerns, please don't hesitate to call.     Sincerely,        Andrew Razo, APRN - CNP

## 2023-03-22 NOTE — PROGRESS NOTES
Moderate Alzheimer's dementia with agitation, unspecified timing of dementia onset (HCC)  Stable, no changes today    3. Eczema, unspecified type  Recommend Eucerin or Aveeno twice a day if pt is agreeable. See pt instructions  F/u 4 months, sooner prn. Discussed use, benefit, and side effects of prescribed medications. All patient questions answered. Pt voiced understanding.

## 2023-03-22 NOTE — PATIENT INSTRUCTIONS
Medication changes: discontinue Melatonin    Titrate Toujeo insulin by 2 units every 2 nights until you reach 30 units. 3/22/23 & 3/23/23 give 22 units nightly. 3/24/23 & 3/25/23 give 24 units nightly. 3/26 & 3/27 give 26 units nightly. 3/28 & 3/29 give 28 units nightly.    Starting 3/30/23 give 30 units that night and thereafter

## 2023-03-24 ENCOUNTER — TELEPHONE (OUTPATIENT)
Dept: FAMILY MEDICINE CLINIC | Age: 78
End: 2023-03-24

## 2023-03-24 NOTE — TELEPHONE ENCOUNTER
Patient son has some concerns about medication that nursing home is giving vs what shirley prescribes for insulin    Son would like a call back for clarification     Please call dickson at [phone]    He stated he called on Wednesday but not seeing anything '

## 2023-03-27 ENCOUNTER — TELEPHONE (OUTPATIENT)
Dept: FAMILY MEDICINE CLINIC | Age: 78
End: 2023-03-27

## 2023-03-27 ENCOUNTER — PATIENT MESSAGE (OUTPATIENT)
Dept: FAMILY MEDICINE CLINIC | Age: 78
End: 2023-03-27

## 2023-03-27 DIAGNOSIS — E11.9 TYPE 2 DIABETES MELLITUS WITHOUT COMPLICATION, WITH LONG-TERM CURRENT USE OF INSULIN (HCC): ICD-10-CM

## 2023-03-27 DIAGNOSIS — F02.B11 MODERATE ALZHEIMER'S DEMENTIA WITH AGITATION, UNSPECIFIED TIMING OF DEMENTIA ONSET (HCC): ICD-10-CM

## 2023-03-27 DIAGNOSIS — M25.561 CHRONIC PAIN OF BOTH KNEES: ICD-10-CM

## 2023-03-27 DIAGNOSIS — G30.9 MODERATE ALZHEIMER'S DEMENTIA WITH AGITATION, UNSPECIFIED TIMING OF DEMENTIA ONSET (HCC): ICD-10-CM

## 2023-03-27 DIAGNOSIS — M25.562 CHRONIC PAIN OF BOTH KNEES: ICD-10-CM

## 2023-03-27 DIAGNOSIS — I10 ESSENTIAL HYPERTENSION: ICD-10-CM

## 2023-03-27 DIAGNOSIS — Z79.4 TYPE 2 DIABETES MELLITUS WITHOUT COMPLICATION, WITH LONG-TERM CURRENT USE OF INSULIN (HCC): ICD-10-CM

## 2023-03-27 DIAGNOSIS — G89.29 CHRONIC PAIN OF BOTH KNEES: ICD-10-CM

## 2023-03-27 RX ORDER — INSULIN LISPRO 100 [IU]/ML
INJECTION, SOLUTION INTRAVENOUS; SUBCUTANEOUS
Qty: 15 ADJUSTABLE DOSE PRE-FILLED PEN SYRINGE | Refills: 3 | Status: SHIPPED | OUTPATIENT
Start: 2023-03-27

## 2023-03-27 RX ORDER — RISPERIDONE 0.5 MG/1
0.5 TABLET ORAL 2 TIMES DAILY
Qty: 180 TABLET | Refills: 3 | Status: SHIPPED | OUTPATIENT
Start: 2023-03-27 | End: 2024-03-26

## 2023-03-27 RX ORDER — NICOTINE POLACRILEX 4 MG
15 LOZENGE BUCCAL SEE ADMIN INSTRUCTIONS
Qty: 45 G | Refills: 1 | Status: SHIPPED | OUTPATIENT
Start: 2023-03-27

## 2023-03-27 RX ORDER — AMLODIPINE BESYLATE 5 MG/1
5 TABLET ORAL DAILY
Qty: 90 TABLET | Refills: 3 | Status: SHIPPED | OUTPATIENT
Start: 2023-03-27 | End: 2024-03-26

## 2023-03-27 RX ORDER — FLASH GLUCOSE SENSOR
KIT MISCELLANEOUS
Qty: 1 EACH | Refills: 11 | Status: SHIPPED | OUTPATIENT
Start: 2023-03-27

## 2023-03-27 RX ORDER — SENNOSIDES 8.6 MG
650 CAPSULE ORAL 2 TIMES DAILY
Qty: 90 TABLET | Refills: 5 | Status: SHIPPED | OUTPATIENT
Start: 2023-03-27

## 2023-03-27 RX ORDER — LISINOPRIL AND HYDROCHLOROTHIAZIDE 20; 12.5 MG/1; MG/1
2 TABLET ORAL DAILY
Qty: 180 TABLET | Refills: 3 | Status: SHIPPED | OUTPATIENT
Start: 2023-03-27 | End: 2024-03-26

## 2023-03-27 RX ORDER — INSULIN GLARGINE 100 [IU]/ML
INJECTION, SOLUTION SUBCUTANEOUS
Qty: 5 ADJUSTABLE DOSE PRE-FILLED PEN SYRINGE | Refills: 0 | Status: SHIPPED | OUTPATIENT
Start: 2023-03-27

## 2023-03-27 NOTE — TELEPHONE ENCOUNTER
----- Message from Aldo Ashley sent at 3/22/2023  4:56 PM EDT -----  Subject: Message to Provider    QUESTIONS  Information for Provider? Patients on [facility] called to discuss, INSULIN   MEDICATION update/follow up he stated there is a discrepancies with actual   insulin injection medications listed and would like to update that. Please   contact to advise.   ---------------------------------------------------------------------------  --------------  Silvia GARCIA  [phone]; OK to leave message on voicemail  ---------------------------------------------------------------------------  --------------  SCRIPT ANSWERS  Relationship to Patient? Other  Representative Name? Parmjit Dave Camposl  Is the representative on the Communication Release of Information (KATHERINE)   form in Epic?  Yes

## 2023-03-27 NOTE — TELEPHONE ENCOUNTER
From: Pietro Range  To: Xiomara Cooper  Sent: 3/27/2023 11:59 AM EDT  Subject: Medication    As discussed, here is my mom's current list of meds Valeria Buck is giving her. I think the insulin and Donepezil differ. Let me know what I need to do next.     Delores Knows  [phone]

## 2023-04-28 ENCOUNTER — PATIENT MESSAGE (OUTPATIENT)
Dept: FAMILY MEDICINE CLINIC | Age: 78
End: 2023-04-28

## 2023-05-02 ENCOUNTER — TELEPHONE (OUTPATIENT)
Dept: FAMILY MEDICINE CLINIC | Age: 78
End: 2023-05-02

## 2023-05-02 NOTE — TELEPHONE ENCOUNTER
Maeve Caldwell from [employer] called and needs clarification on patients     risperiDONE (RISPERDAL) 0.5 MG tablet [[id number]]       Patient has been taking 0.25 and she has script for 0.5    They are needing clarification     Call back number is [phone]  Fax number is [phone]

## 2023-05-16 ENCOUNTER — APPOINTMENT (OUTPATIENT)
Dept: CT IMAGING | Age: 78
DRG: 312 | End: 2023-05-16
Payer: MEDICARE

## 2023-05-16 ENCOUNTER — APPOINTMENT (OUTPATIENT)
Dept: GENERAL RADIOLOGY | Age: 78
DRG: 312 | End: 2023-05-16
Payer: MEDICARE

## 2023-05-16 ENCOUNTER — TELEPHONE (OUTPATIENT)
Dept: FAMILY MEDICINE CLINIC | Age: 78
End: 2023-05-16

## 2023-05-16 ENCOUNTER — HOSPITAL ENCOUNTER (INPATIENT)
Age: 78
LOS: 2 days | Discharge: NURSING, REHAB OR LONG-TERM CARE | DRG: 312 | End: 2023-05-18
Attending: INTERNAL MEDICINE | Admitting: INTERNAL MEDICINE
Payer: MEDICARE
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
## 2023-05-16 DIAGNOSIS — R77.8 ELEVATED TROPONIN: ICD-10-CM

## 2023-05-16 DIAGNOSIS — F41.9 ANXIETY: ICD-10-CM

## 2023-05-16 DIAGNOSIS — R73.9 HYPERGLYCEMIA: ICD-10-CM

## 2023-05-16 PROBLEM — R55 SYNCOPE: Status: ACTIVE | Noted: 2023-05-16

## 2023-05-16 PROBLEM — R55 SYNCOPE AND COLLAPSE: Status: ACTIVE | Noted: 2023-05-16

## 2023-05-16 LAB
ALBUMIN SERPL-MCNC: 3.8 G/DL (ref 3.4–5)
ALBUMIN/GLOB SERPL: 1.7 {RATIO} (ref 1.1–2.2)
ALP SERPL-CCNC: 106 U/L (ref 40–129)
ALT SERPL-CCNC: 11 U/L (ref 10–40)
ANION GAP SERPL CALCULATED.3IONS-SCNC: 12 MMOL/L (ref 3–16)
AST SERPL-CCNC: 16 U/L (ref 15–37)
BASE EXCESS BLDV CALC-SCNC: -0.3 MMOL/L (ref -3–3)
BASOPHILS # BLD: 0.1 K/UL (ref 0–0.2)
BASOPHILS NFR BLD: 0.7 %
BETA-HYDROXYBUTYRATE: 0.9 MMOL/L (ref 0–0.27)
BILIRUB SERPL-MCNC: 0.4 MG/DL (ref 0–1)
BUN SERPL-MCNC: 31 MG/DL (ref 7–20)
CALCIUM SERPL-MCNC: 9 MG/DL (ref 8.3–10.6)
CHLORIDE SERPL-SCNC: 101 MMOL/L (ref 99–110)
CO2 BLDV-SCNC: 58 MMOL/L
CO2 SERPL-SCNC: 23 MMOL/L (ref 21–32)
COHGB MFR BLDV: 2.6 % (ref 0–1.5)
CREAT SERPL-MCNC: 1.1 MG/DL (ref 0.6–1.2)
DEPRECATED RDW RBC AUTO: 13.7 % (ref 12.4–15.4)
EKG ATRIAL RATE: 64 BPM
EKG DIAGNOSIS: NORMAL
EKG P AXIS: 45 DEGREES
EKG P-R INTERVAL: 146 MS
EKG Q-T INTERVAL: 500 MS
EKG QRS DURATION: 142 MS
EKG QTC CALCULATION (BAZETT): 515 MS
EKG R AXIS: -46 DEGREES
EKG T AXIS: 39 DEGREES
EKG VENTRICULAR RATE: 64 BPM
EOSINOPHIL # BLD: 0 K/UL (ref 0–0.6)
EOSINOPHIL NFR BLD: 0.5 %
GFR SERPLBLD CREATININE-BSD FMLA CKD-EPI: 51 ML/MIN/{1.73_M2}
GLUCOSE BLD-MCNC: 239 MG/DL (ref 70–99)
GLUCOSE BLD-MCNC: 267 MG/DL (ref 70–99)
GLUCOSE SERPL-MCNC: 368 MG/DL (ref 70–99)
HCO3 BLDV-SCNC: 24.5 MMOL/L (ref 23–29)
HCT VFR BLD AUTO: 36.3 % (ref 36–48)
HGB BLD-MCNC: 12.1 G/DL (ref 12–16)
LYMPHOCYTES # BLD: 0.4 K/UL (ref 1–5.1)
LYMPHOCYTES NFR BLD: 4.5 %
MCH RBC QN AUTO: 31.8 PG (ref 26–34)
MCHC RBC AUTO-ENTMCNC: 33.2 G/DL (ref 31–36)
MCV RBC AUTO: 95.7 FL (ref 80–100)
METHGB MFR BLDV: 0.2 %
MONOCYTES # BLD: 0.3 K/UL (ref 0–1.3)
MONOCYTES NFR BLD: 3.1 %
NEUTROPHILS # BLD: 8.1 K/UL (ref 1.7–7.7)
NEUTROPHILS NFR BLD: 91.2 %
O2 CT VFR BLDV CALC: 17 VOL %
O2 THERAPY: ABNORMAL
PCO2 BLDV: 39.6 MMHG (ref 40–50)
PERFORMED ON: ABNORMAL
PERFORMED ON: ABNORMAL
PH BLDV: 7.4 [PH] (ref 7.35–7.45)
PLATELET # BLD AUTO: 209 K/UL (ref 135–450)
PMV BLD AUTO: 8.9 FL (ref 5–10.5)
PO2 BLDV: 101 MMHG (ref 25–40)
POTASSIUM SERPL-SCNC: 4.5 MMOL/L (ref 3.5–5.1)
PROT SERPL-MCNC: 6.1 G/DL (ref 6.4–8.2)
RBC # BLD AUTO: 3.8 M/UL (ref 4–5.2)
SAO2 % BLDV: 99 %
SODIUM SERPL-SCNC: 136 MMOL/L (ref 136–145)
TROPONIN, HIGH SENSITIVITY: 27 NG/L (ref 0–14)
TROPONIN, HIGH SENSITIVITY: 34 NG/L (ref 0–14)
WBC # BLD AUTO: 8.8 K/UL (ref 4–11)

## 2023-05-16 PROCEDURE — 82010 KETONE BODYS QUAN: CPT

## 2023-05-16 PROCEDURE — 2060000000 HC ICU INTERMEDIATE R&B

## 2023-05-16 PROCEDURE — 80053 COMPREHEN METABOLIC PANEL: CPT

## 2023-05-16 PROCEDURE — 82803 BLOOD GASES ANY COMBINATION: CPT

## 2023-05-16 PROCEDURE — 6360000002 HC RX W HCPCS: Performed by: INTERNAL MEDICINE

## 2023-05-16 PROCEDURE — 84484 ASSAY OF TROPONIN QUANT: CPT

## 2023-05-16 PROCEDURE — 70450 CT HEAD/BRAIN W/O DYE: CPT

## 2023-05-16 PROCEDURE — 85025 COMPLETE CBC W/AUTO DIFF WBC: CPT

## 2023-05-16 PROCEDURE — 71045 X-RAY EXAM CHEST 1 VIEW: CPT

## 2023-05-16 PROCEDURE — 2580000003 HC RX 258: Performed by: INTERNAL MEDICINE

## 2023-05-16 PROCEDURE — 93005 ELECTROCARDIOGRAM TRACING: CPT | Performed by: PHYSICIAN ASSISTANT

## 2023-05-16 PROCEDURE — 83036 HEMOGLOBIN GLYCOSYLATED A1C: CPT

## 2023-05-16 PROCEDURE — 99285 EMERGENCY DEPT VISIT HI MDM: CPT

## 2023-05-16 PROCEDURE — 72125 CT NECK SPINE W/O DYE: CPT

## 2023-05-16 PROCEDURE — 6370000000 HC RX 637 (ALT 250 FOR IP): Performed by: INTERNAL MEDICINE

## 2023-05-16 PROCEDURE — 2580000003 HC RX 258: Performed by: PHYSICIAN ASSISTANT

## 2023-05-16 PROCEDURE — 6360000002 HC RX W HCPCS: Performed by: PHYSICIAN ASSISTANT

## 2023-05-16 PROCEDURE — 93010 ELECTROCARDIOGRAM REPORT: CPT | Performed by: INTERNAL MEDICINE

## 2023-05-16 RX ORDER — ENOXAPARIN SODIUM 100 MG/ML
40 INJECTION SUBCUTANEOUS NIGHTLY
Status: DISCONTINUED | OUTPATIENT
Start: 2023-05-16 | End: 2023-05-18 | Stop reason: HOSPADM

## 2023-05-16 RX ORDER — PROCHLORPERAZINE MALEATE 5 MG/1
10 TABLET ORAL EVERY 6 HOURS PRN
Status: DISCONTINUED | OUTPATIENT
Start: 2023-05-16 | End: 2023-05-18 | Stop reason: HOSPADM

## 2023-05-16 RX ORDER — PROCHLORPERAZINE EDISYLATE 5 MG/ML
10 INJECTION INTRAMUSCULAR; INTRAVENOUS EVERY 6 HOURS PRN
Status: DISCONTINUED | OUTPATIENT
Start: 2023-05-16 | End: 2023-05-18 | Stop reason: HOSPADM

## 2023-05-16 RX ORDER — TAMOXIFEN CITRATE 10 MG/1
20 TABLET ORAL DAILY
COMMUNITY
End: 2023-05-23 | Stop reason: ALTCHOICE

## 2023-05-16 RX ORDER — TAMOXIFEN CITRATE 10 MG/1
20 TABLET ORAL DAILY
Status: DISCONTINUED | OUTPATIENT
Start: 2023-05-16 | End: 2023-05-18 | Stop reason: HOSPADM

## 2023-05-16 RX ORDER — SODIUM CHLORIDE 9 MG/ML
INJECTION, SOLUTION INTRAVENOUS PRN
Status: DISCONTINUED | OUTPATIENT
Start: 2023-05-16 | End: 2023-05-18 | Stop reason: HOSPADM

## 2023-05-16 RX ORDER — SODIUM CHLORIDE 0.9 % (FLUSH) 0.9 %
10 SYRINGE (ML) INJECTION PRN
Status: DISCONTINUED | OUTPATIENT
Start: 2023-05-16 | End: 2023-05-18 | Stop reason: HOSPADM

## 2023-05-16 RX ORDER — DONEPEZIL HYDROCHLORIDE 5 MG/1
5 TABLET, FILM COATED ORAL NIGHTLY
COMMUNITY

## 2023-05-16 RX ORDER — SENNOSIDES 8.6 MG
650 CAPSULE ORAL 2 TIMES DAILY
Status: DISCONTINUED | OUTPATIENT
Start: 2023-05-16 | End: 2023-05-16 | Stop reason: ALTCHOICE

## 2023-05-16 RX ORDER — INSULIN GLARGINE 100 [IU]/ML
20 INJECTION, SOLUTION SUBCUTANEOUS NIGHTLY
Status: DISCONTINUED | OUTPATIENT
Start: 2023-05-16 | End: 2023-05-18

## 2023-05-16 RX ORDER — POTASSIUM CHLORIDE 20 MEQ/1
40 TABLET, EXTENDED RELEASE ORAL PRN
Status: DISCONTINUED | OUTPATIENT
Start: 2023-05-16 | End: 2023-05-18 | Stop reason: HOSPADM

## 2023-05-16 RX ORDER — INSULIN LISPRO 100 [IU]/ML
0-4 INJECTION, SOLUTION INTRAVENOUS; SUBCUTANEOUS NIGHTLY
Status: DISCONTINUED | OUTPATIENT
Start: 2023-05-16 | End: 2023-05-18 | Stop reason: HOSPADM

## 2023-05-16 RX ORDER — ACETAMINOPHEN 325 MG/1
650 TABLET ORAL 2 TIMES DAILY
Status: DISCONTINUED | OUTPATIENT
Start: 2023-05-16 | End: 2023-05-18 | Stop reason: HOSPADM

## 2023-05-16 RX ORDER — LISINOPRIL 20 MG/1
20 TABLET ORAL DAILY
Status: DISCONTINUED | OUTPATIENT
Start: 2023-05-16 | End: 2023-05-18 | Stop reason: HOSPADM

## 2023-05-16 RX ORDER — HYDROCHLOROTHIAZIDE 25 MG/1
12.5 TABLET ORAL DAILY
Status: DISCONTINUED | OUTPATIENT
Start: 2023-05-16 | End: 2023-05-18 | Stop reason: HOSPADM

## 2023-05-16 RX ORDER — POTASSIUM CHLORIDE 7.45 MG/ML
10 INJECTION INTRAVENOUS PRN
Status: DISCONTINUED | OUTPATIENT
Start: 2023-05-16 | End: 2023-05-18 | Stop reason: HOSPADM

## 2023-05-16 RX ORDER — 0.9 % SODIUM CHLORIDE 0.9 %
500 INTRAVENOUS SOLUTION INTRAVENOUS PRN
Status: DISCONTINUED | OUTPATIENT
Start: 2023-05-16 | End: 2023-05-18 | Stop reason: HOSPADM

## 2023-05-16 RX ORDER — ONDANSETRON 2 MG/ML
4 INJECTION INTRAMUSCULAR; INTRAVENOUS EVERY 6 HOURS PRN
Status: DISCONTINUED | OUTPATIENT
Start: 2023-05-16 | End: 2023-05-16

## 2023-05-16 RX ORDER — SODIUM CHLORIDE 9 MG/ML
INJECTION, SOLUTION INTRAVENOUS CONTINUOUS
Status: DISCONTINUED | OUTPATIENT
Start: 2023-05-16 | End: 2023-05-18 | Stop reason: HOSPADM

## 2023-05-16 RX ORDER — FLUOXETINE 10 MG/1
10 CAPSULE ORAL DAILY
Status: DISCONTINUED | OUTPATIENT
Start: 2023-05-16 | End: 2023-05-18 | Stop reason: HOSPADM

## 2023-05-16 RX ORDER — HYDRALAZINE HYDROCHLORIDE 20 MG/ML
10 INJECTION INTRAMUSCULAR; INTRAVENOUS EVERY 6 HOURS PRN
Status: DISCONTINUED | OUTPATIENT
Start: 2023-05-16 | End: 2023-05-18 | Stop reason: HOSPADM

## 2023-05-16 RX ORDER — ACETAMINOPHEN 500 MG
500 TABLET ORAL EVERY 6 HOURS PRN
Status: DISCONTINUED | OUTPATIENT
Start: 2023-05-16 | End: 2023-05-18 | Stop reason: HOSPADM

## 2023-05-16 RX ORDER — RISPERIDONE 0.25 MG/1
0.5 TABLET ORAL 2 TIMES DAILY
Status: DISCONTINUED | OUTPATIENT
Start: 2023-05-16 | End: 2023-05-18 | Stop reason: HOSPADM

## 2023-05-16 RX ORDER — POTASSIUM CHLORIDE 7.45 MG/ML
10 INJECTION INTRAVENOUS PRN
Status: DISCONTINUED | OUTPATIENT
Start: 2023-05-16 | End: 2023-05-16 | Stop reason: SDUPTHER

## 2023-05-16 RX ORDER — ACETAMINOPHEN 325 MG/1
650 TABLET ORAL EVERY 6 HOURS PRN
Status: DISCONTINUED | OUTPATIENT
Start: 2023-05-16 | End: 2023-05-18 | Stop reason: HOSPADM

## 2023-05-16 RX ORDER — ONDANSETRON 4 MG/1
4 TABLET, ORALLY DISINTEGRATING ORAL EVERY 8 HOURS PRN
Status: DISCONTINUED | OUTPATIENT
Start: 2023-05-16 | End: 2023-05-16

## 2023-05-16 RX ORDER — AMLODIPINE BESYLATE 5 MG/1
5 TABLET ORAL DAILY
Status: DISCONTINUED | OUTPATIENT
Start: 2023-05-16 | End: 2023-05-18 | Stop reason: HOSPADM

## 2023-05-16 RX ORDER — POTASSIUM CHLORIDE 20 MEQ/1
40 TABLET, EXTENDED RELEASE ORAL PRN
Status: DISCONTINUED | OUTPATIENT
Start: 2023-05-16 | End: 2023-05-16 | Stop reason: SDUPTHER

## 2023-05-16 RX ORDER — LISINOPRIL AND HYDROCHLOROTHIAZIDE 20; 12.5 MG/1; MG/1
2 TABLET ORAL DAILY
Status: DISCONTINUED | OUTPATIENT
Start: 2023-05-16 | End: 2023-05-16 | Stop reason: CLARIF

## 2023-05-16 RX ORDER — SODIUM CHLORIDE 0.9 % (FLUSH) 0.9 %
5-40 SYRINGE (ML) INJECTION EVERY 12 HOURS SCHEDULED
Status: DISCONTINUED | OUTPATIENT
Start: 2023-05-16 | End: 2023-05-18 | Stop reason: HOSPADM

## 2023-05-16 RX ORDER — INSULIN LISPRO 100 [IU]/ML
0-8 INJECTION, SOLUTION INTRAVENOUS; SUBCUTANEOUS
Status: DISCONTINUED | OUTPATIENT
Start: 2023-05-16 | End: 2023-05-18 | Stop reason: HOSPADM

## 2023-05-16 RX ORDER — ACETAMINOPHEN 650 MG/1
650 SUPPOSITORY RECTAL EVERY 6 HOURS PRN
Status: DISCONTINUED | OUTPATIENT
Start: 2023-05-16 | End: 2023-05-18 | Stop reason: HOSPADM

## 2023-05-16 RX ORDER — ACETAMINOPHEN 500 MG
500 TABLET ORAL EVERY 6 HOURS PRN
COMMUNITY
End: 2023-05-23 | Stop reason: DRUGHIGH

## 2023-05-16 RX ORDER — 0.9 % SODIUM CHLORIDE 0.9 %
1000 INTRAVENOUS SOLUTION INTRAVENOUS ONCE
Status: COMPLETED | OUTPATIENT
Start: 2023-05-16 | End: 2023-05-16

## 2023-05-16 RX ORDER — DEXTROSE MONOHYDRATE 100 MG/ML
INJECTION, SOLUTION INTRAVENOUS CONTINUOUS PRN
Status: DISCONTINUED | OUTPATIENT
Start: 2023-05-16 | End: 2023-05-18 | Stop reason: HOSPADM

## 2023-05-16 RX ORDER — DONEPEZIL HYDROCHLORIDE 5 MG/1
5 TABLET, FILM COATED ORAL NIGHTLY
Status: DISCONTINUED | OUTPATIENT
Start: 2023-05-16 | End: 2023-05-18 | Stop reason: HOSPADM

## 2023-05-16 RX ORDER — LORAZEPAM 2 MG/ML
1 INJECTION INTRAMUSCULAR ONCE
Status: COMPLETED | OUTPATIENT
Start: 2023-05-16 | End: 2023-05-16

## 2023-05-16 RX ADMIN — INSULIN LISPRO 2 UNITS: 100 INJECTION, SOLUTION INTRAVENOUS; SUBCUTANEOUS at 18:36

## 2023-05-16 RX ADMIN — SODIUM CHLORIDE 1000 ML: 9 INJECTION, SOLUTION INTRAVENOUS at 12:42

## 2023-05-16 RX ADMIN — Medication 10 ML: at 22:48

## 2023-05-16 RX ADMIN — SODIUM CHLORIDE: 9 INJECTION, SOLUTION INTRAVENOUS at 22:46

## 2023-05-16 RX ADMIN — HYDROCHLOROTHIAZIDE 12.5 MG: 25 TABLET ORAL at 18:21

## 2023-05-16 RX ADMIN — RISPERIDONE 0.5 MG: 0.25 TABLET, FILM COATED ORAL at 22:48

## 2023-05-16 RX ADMIN — FLUOXETINE 10 MG: 10 CAPSULE ORAL at 18:21

## 2023-05-16 RX ADMIN — ACETAMINOPHEN 650 MG: 325 TABLET ORAL at 22:48

## 2023-05-16 RX ADMIN — LORAZEPAM 1 MG: 2 INJECTION INTRAMUSCULAR; INTRAVENOUS at 14:16

## 2023-05-16 RX ADMIN — DONEPEZIL HYDROCHLORIDE 5 MG: 5 TABLET, FILM COATED ORAL at 22:49

## 2023-05-16 RX ADMIN — ENOXAPARIN SODIUM 40 MG: 100 INJECTION SUBCUTANEOUS at 22:45

## 2023-05-16 RX ADMIN — INSULIN GLARGINE 20 UNITS: 100 INJECTION, SOLUTION SUBCUTANEOUS at 22:45

## 2023-05-16 RX ADMIN — AMLODIPINE BESYLATE 5 MG: 5 TABLET ORAL at 18:21

## 2023-05-16 RX ADMIN — LISINOPRIL 20 MG: 20 TABLET ORAL at 18:21

## 2023-05-16 ASSESSMENT — PAIN SCALES - WONG BAKER: WONGBAKER_NUMERICALRESPONSE: 0

## 2023-05-16 ASSESSMENT — ENCOUNTER SYMPTOMS: BACK PAIN: 1

## 2023-05-16 NOTE — PROGRESS NOTES
Patient seen in ED, room 07. Admission Initiated but only Allergies, Medical History, , OB status,  Services, and [facility]. Decision Makers verified and completed based off paperwork provided by [address] [place] [address]. RN will need to complete the other required items in addition to the 4 Eyes Assessment, Immunizations, Covid Vaccines, Rights and Responsibilities, orientation to room, Plan of Care, Education/Learning Assessment, Education Plan, white board, height and weight, pain assessment and head to toe assessment. ER nurse Jonathan Elena stated patient will not be able to complete admission. Patient is alert. When asked patient her name and date of birth patient responded by asking what is on her arm and was pointing to the blood pressure cuff. Patient does have Alzheimer's Disease. Patient is from [facility] and is being admitted for syncope. Home Medication Reconciliation has been modified to match the paperwork provided by [facility]. It continues to be marked as \"In process\" as the verification with pharmacy has not been completed. I attempted to call [facility] at number [phone] and was directed to admissions with no answer to review medications. Called back a second time and was transferred to assisted living side of [address] [place] [address] and no answer for first nurse line and transferred a second time to another nurse line with no answer. No family is at bedside.

## 2023-05-16 NOTE — PROGRESS NOTES
Pharmacy Home Medication Reconciliation Note    A medication reconciliation has been completed for William Yap 1945    Pharmacy: [address] [place] [address] Mail Order   Information provided by: Nurse from [address] [place] [address]     The patient's home medication list is as follows: No current facility-administered medications on file prior to encounter. Current Outpatient Medications on File Prior to Encounter   Medication Sig Dispense Refill    donepezil (ARICEPT) 5 MG tablet Take 1 tablet by mouth nightly      tamoxifen (NOLVADEX) 10 MG tablet Take 2 tablets by mouth daily      acetaminophen (TYLENOL) 500 MG tablet Take 1 tablet by mouth every 6 hours as needed for Pain      acetaminophen (TYLENOL) 650 MG extended release tablet Take 1 tablet by mouth in the morning and at bedtime 90 tablet 5    risperiDONE (RISPERDAL) 0.5 MG tablet Take 1 tablet by mouth 2 times daily 180 tablet 3    lisinopril-hydroCHLOROthiazide (PRINZIDE;ZESTORETIC) 20-12.5 MG per tablet Take 2 tablets by mouth daily 180 tablet 3    insulin glargine (LANTUS SOLOSTAR) 100 UNIT/ML injection pen Starting with 20 units, increase insulin by 2 units every 2 nights until you reach 30 units. Inject subcutaneously nightly.  (Patient taking differently: Inject 20 units subcutaneously at bedtime for diabetes) 5 Adjustable Dose Pre-filled Pen Syringe 0    Insulin Pen Needle 32G X 6 MM MISC Use with basal insulin and sliding scale insulin for SQ injections 400 each 5    Continuous Blood Gluc Sensor (FREESTYLE HENRRY 14 DAY SENSOR) MISC USE AS DIRECTED TO CHECK GLUCOSE 1 each 11    amLODIPine (NORVASC) 5 MG tablet Take 1 tablet by mouth daily 90 tablet 3    insulin lispro, 1 Unit Dial, (HUMALOG KWIKPEN) 100 UNIT/ML SOPN Inject SQ premeal following SS:  0 units: 120-299mg/dL  4 units: 300-349 mg/dL  6 units: 350-400 mg/dL  8 units: 401 or greater  Checking pre meal glucose 15 Adjustable Dose Pre-filled Pen Syringe 3    glucose (GLUTOSE) 40 %

## 2023-05-16 NOTE — ED PROVIDER NOTES
[facility]        Pt Name: Faith Steve  MRN: [id number]  [place] 1945  Date of evaluation: 5/16/2023  Provider: Alessandra Josue PA-C  PCP: LUCIANA Alston CNP  Note Started: 10:50 AM EDT 5/16/23      ESTEFANI. I have evaluated this patient. My supervising physician was available for consultation. CHIEF COMPLAINT       Chief Complaint   Patient presents with    Fall    Back Pain     [facility] [place]   found on floor by nurse doing breakfast rounds. Mary Ann Serrato getting out of shower. ? Loc  c/o back pain       HISTORY OF PRESENT ILLNESS: 1 or more Elements     History From: EMS, ECF  Limitations to history : Dementia    Faith Steve is a [de-identified] y.o. female who presents for evaluation after concern for fall. Apparently the patient was found while doing breakfast rounds on the floor. She was wet and had just gotten out of the shower. Unsure whether or not fall was mechanical.  Patient is very poor historian, history of dementia. Apparently EMS reports that she was initially complaining of back pain while on the ground, but, after they were able to get her up denies any back pain. She denies any injuries. EMS reports glucose of 371, history of diabetes. Medication compliance is unknown. No other additional information is able to be obtained at this time. Nursing Notes were all reviewed and agreed with or any disagreements were addressed in the HPI. REVIEW OF SYSTEMS :      Review of Systems   Unable to perform ROS: Dementia   Musculoskeletal:  Positive for back pain. Positives and Pertinent negatives as per HPI.      SURGICAL HISTORY     Past Surgical History:   Procedure Laterality Date    AXILLARY SURGERY Left     BREAST BIOPSY  10/5/12    BREAST LUMPECTOMY  11 6 12    with axillary nodedissection    CARDIAC SURGERY  2010    coronary angiogram-no blockage    CARPAL TUNNEL RELEASE      bilateral

## 2023-05-16 NOTE — ED NOTES
Bedside report given to 3T RN. No questions or concerns at this time, pt incontinent and brief changed.       Ronaldo Fajardo, CLAUDY  05/16/23 2150

## 2023-05-16 NOTE — H&P
Apparently EMS reports that she was initially complaining of back pain while on the ground, but, after they were able to get her up denies any back pain. She denies any injuries. EMS reports glucose of 371, history of diabetes. Medication compliance is unknown. No other additional information is able to be obtained at this time. CT Head done in ER, reviewed by self, I do not see acute cva or bleed. CBC and CMP are remarkable for hyperglycemia at 368 with no evidence of diabetic ketoacidosis. She does have troponin of 34. CT of the head shows no acute intracranial abnormality. She has had findings of arachnoid cyst as well as a multinodular goiter on CT of cervical spine. Chest x-ray is negative. Due to patient history of dementia and the fact that she is a poor historian and being found down on the ground, on sure whether or not this was mechanical or cardiogenic,she will be admitted for further evaluation     FLuids and insulin ordered (it is unclear whether patient is taking long acting insulin from her snf med list)  ECHO ordered      Problem list of hospitalization thus far: Active Hospital Problems    Diagnosis     Depression [F32. A]      Priority: Medium    Uncontrolled type 2 diabetes mellitus with hyperglycemia (HCC) [E11.65]      Priority: Medium    Alzheimer's dementia ([address] [G30.9, F02.80]      Priority: Medium    Syncope [R55]          Review of Systems: (1 system for EPF, 2-9 for detailed, 10+ for comprehensive)  Cannot obtain from pt 2/2 mentation change    Past Medical History:   Past Medical History:   Diagnosis Date    Alzheimer's disease, unspecified ([address]     Arthritis     hands, spine    Aural vertigo, unspecified ear     Cancer ([address]     LEFT BREAST    Cataract     Crohn disease ([address]     POSSIBLE DIAGNOSIS    Dementia ([address]     Dental bridge present     lower permanent    Dental crowns present     Venear upper anterior    Diabetes mellitus ([address]     type 2 without

## 2023-05-16 NOTE — ED NOTES
Pt pulled out her IV to the R hand. Educated patient about the importance of leaving in IV access. Pt refusing at this time.      Abdi Oro RN  05/16/23 7615

## 2023-05-16 NOTE — PROGRESS NOTES
Admit for syncope    Active Hospital Problems    Diagnosis Date Noted    Depression [F32.A] 12/15/2022     Priority: Medium    Uncontrolled type 2 diabetes mellitus with hyperglycemia ([facility] [E11.65] 10/28/2022     Priority: Medium    Alzheimer's dementia ([facility] [G30.9, F02.80] 09/07/2022     Priority: Medium    Syncope [R55] 05/16/2023     Please use [employer] to contact me with any questions during the day. The hospitalist service will provide cross-coverage for this patient from 7pm to 7am.    During those hours, contact the on-call hospitalist MD/ESTEFANI for questions.

## 2023-05-16 NOTE — ED NOTES
Pt confused and anxious, attempting multiple times to climb out of bed. Provider ordered Ativan at this time, tech sitting with patient for safety.       Carla Ford RN  05/16/23 1489

## 2023-05-17 LAB
ANION GAP SERPL CALCULATED.3IONS-SCNC: 7 MMOL/L (ref 3–16)
BACTERIA URNS QL MICRO: ABNORMAL /HPF
BASOPHILS # BLD: 0 K/UL (ref 0–0.2)
BASOPHILS NFR BLD: 0.8 %
BILIRUB UR QL STRIP.AUTO: NEGATIVE
BUN SERPL-MCNC: 25 MG/DL (ref 7–20)
CALCIUM SERPL-MCNC: 8.7 MG/DL (ref 8.3–10.6)
CHLORIDE SERPL-SCNC: 108 MMOL/L (ref 99–110)
CLARITY UR: ABNORMAL
CO2 SERPL-SCNC: 24 MMOL/L (ref 21–32)
COLOR UR: YELLOW
CREAT SERPL-MCNC: 0.9 MG/DL (ref 0.6–1.2)
DEPRECATED RDW RBC AUTO: 14.1 % (ref 12.4–15.4)
EOSINOPHIL # BLD: 0.2 K/UL (ref 0–0.6)
EOSINOPHIL NFR BLD: 2.8 %
EPI CELLS #/AREA URNS AUTO: 3 /HPF (ref 0–5)
EST. AVERAGE GLUCOSE BLD GHB EST-MCNC: 226 MG/DL
GFR SERPLBLD CREATININE-BSD FMLA CKD-EPI: >60 ML/MIN/{1.73_M2}
GLUCOSE BLD-MCNC: 171 MG/DL (ref 70–99)
GLUCOSE BLD-MCNC: 180 MG/DL (ref 70–99)
GLUCOSE BLD-MCNC: 193 MG/DL (ref 70–99)
GLUCOSE BLD-MCNC: 267 MG/DL (ref 70–99)
GLUCOSE SERPL-MCNC: 220 MG/DL (ref 70–99)
GLUCOSE UR STRIP.AUTO-MCNC: 500 MG/DL
HBA1C MFR BLD: 9.5 %
HCT VFR BLD AUTO: 35 % (ref 36–48)
HGB BLD-MCNC: 11.7 G/DL (ref 12–16)
HGB UR QL STRIP.AUTO: NEGATIVE
HYALINE CASTS #/AREA URNS AUTO: 2 /LPF (ref 0–8)
KETONES UR STRIP.AUTO-MCNC: NEGATIVE MG/DL
LACTATE BLDV-SCNC: 1.4 MMOL/L (ref 0.4–2)
LEUKOCYTE ESTERASE UR QL STRIP.AUTO: ABNORMAL
LV EF: 55 %
LVEF MODALITY: NORMAL
LYMPHOCYTES # BLD: 1.2 K/UL (ref 1–5.1)
LYMPHOCYTES NFR BLD: 20.1 %
MCH RBC QN AUTO: 32.1 PG (ref 26–34)
MCHC RBC AUTO-ENTMCNC: 33.4 G/DL (ref 31–36)
MCV RBC AUTO: 95.9 FL (ref 80–100)
MONOCYTES # BLD: 0.5 K/UL (ref 0–1.3)
MONOCYTES NFR BLD: 8.5 %
NEUTROPHILS # BLD: 4.1 K/UL (ref 1.7–7.7)
NEUTROPHILS NFR BLD: 67.8 %
NITRITE UR QL STRIP.AUTO: NEGATIVE
PERFORMED ON: ABNORMAL
PH UR STRIP.AUTO: 5 [PH] (ref 5–8)
PLATELET # BLD AUTO: 202 K/UL (ref 135–450)
PMV BLD AUTO: 8.3 FL (ref 5–10.5)
POTASSIUM SERPL-SCNC: 3.9 MMOL/L (ref 3.5–5.1)
PROT UR STRIP.AUTO-MCNC: NEGATIVE MG/DL
RBC # BLD AUTO: 3.65 M/UL (ref 4–5.2)
RBC CLUMPS #/AREA URNS AUTO: 0 /HPF (ref 0–4)
SODIUM SERPL-SCNC: 139 MMOL/L (ref 136–145)
SP GR UR STRIP.AUTO: 1.01 (ref 1–1.03)
TROPONIN, HIGH SENSITIVITY: 35 NG/L (ref 0–14)
UA DIPSTICK W REFLEX MICRO PNL UR: YES
URN SPEC COLLECT METH UR: ABNORMAL
UROBILINOGEN UR STRIP-ACNC: 0.2 E.U./DL
WBC # BLD AUTO: 6.1 K/UL (ref 4–11)
WBC #/AREA URNS AUTO: 120 /HPF (ref 0–5)

## 2023-05-17 PROCEDURE — 2060000000 HC ICU INTERMEDIATE R&B

## 2023-05-17 PROCEDURE — 36415 COLL VENOUS BLD VENIPUNCTURE: CPT

## 2023-05-17 PROCEDURE — C8929 TTE W OR WO FOL WCON,DOPPLER: HCPCS

## 2023-05-17 PROCEDURE — 84484 ASSAY OF TROPONIN QUANT: CPT

## 2023-05-17 PROCEDURE — 97165 OT EVAL LOW COMPLEX 30 MIN: CPT

## 2023-05-17 PROCEDURE — 97530 THERAPEUTIC ACTIVITIES: CPT

## 2023-05-17 PROCEDURE — 81001 URINALYSIS AUTO W/SCOPE: CPT

## 2023-05-17 PROCEDURE — 6370000000 HC RX 637 (ALT 250 FOR IP): Performed by: INTERNAL MEDICINE

## 2023-05-17 PROCEDURE — 2580000003 HC RX 258: Performed by: INTERNAL MEDICINE

## 2023-05-17 PROCEDURE — 6360000004 HC RX CONTRAST MEDICATION: Performed by: INTERNAL MEDICINE

## 2023-05-17 PROCEDURE — 85025 COMPLETE CBC W/AUTO DIFF WBC: CPT

## 2023-05-17 PROCEDURE — 6360000002 HC RX W HCPCS: Performed by: NURSE PRACTITIONER

## 2023-05-17 PROCEDURE — 80048 BASIC METABOLIC PNL TOTAL CA: CPT

## 2023-05-17 PROCEDURE — 97162 PT EVAL MOD COMPLEX 30 MIN: CPT

## 2023-05-17 PROCEDURE — 83605 ASSAY OF LACTIC ACID: CPT

## 2023-05-17 PROCEDURE — 6360000002 HC RX W HCPCS: Performed by: INTERNAL MEDICINE

## 2023-05-17 RX ORDER — LORAZEPAM 2 MG/ML
0.5 INJECTION INTRAMUSCULAR ONCE
Status: COMPLETED | OUTPATIENT
Start: 2023-05-17 | End: 2023-05-17

## 2023-05-17 RX ADMIN — SODIUM CHLORIDE: 9 INJECTION, SOLUTION INTRAVENOUS at 15:35

## 2023-05-17 RX ADMIN — LORAZEPAM 0.5 MG: 2 INJECTION INTRAMUSCULAR; INTRAVENOUS at 01:38

## 2023-05-17 RX ADMIN — FLUOXETINE 10 MG: 10 CAPSULE ORAL at 15:40

## 2023-05-17 RX ADMIN — ACETAMINOPHEN 650 MG: 325 TABLET ORAL at 22:34

## 2023-05-17 RX ADMIN — INSULIN GLARGINE 20 UNITS: 100 INJECTION, SOLUTION SUBCUTANEOUS at 22:34

## 2023-05-17 RX ADMIN — LISINOPRIL 20 MG: 20 TABLET ORAL at 15:40

## 2023-05-17 RX ADMIN — TAMOXIFEN CITRATE 20 MG: 10 TABLET, FILM COATED ORAL at 15:40

## 2023-05-17 RX ADMIN — RISPERIDONE 0.5 MG: 0.25 TABLET, FILM COATED ORAL at 22:34

## 2023-05-17 RX ADMIN — ENOXAPARIN SODIUM 40 MG: 100 INJECTION SUBCUTANEOUS at 22:33

## 2023-05-17 RX ADMIN — PERFLUTREN 1.5 ML: 6.52 INJECTION, SUSPENSION INTRAVENOUS at 15:31

## 2023-05-17 RX ADMIN — HYDROCHLOROTHIAZIDE 12.5 MG: 25 TABLET ORAL at 15:40

## 2023-05-17 RX ADMIN — ACETAMINOPHEN 650 MG: 325 TABLET ORAL at 15:40

## 2023-05-17 RX ADMIN — AMLODIPINE BESYLATE 5 MG: 5 TABLET ORAL at 15:40

## 2023-05-17 RX ADMIN — DONEPEZIL HYDROCHLORIDE 5 MG: 5 TABLET, FILM COATED ORAL at 22:34

## 2023-05-17 RX ADMIN — RISPERIDONE 0.5 MG: 0.25 TABLET, FILM COATED ORAL at 15:40

## 2023-05-17 ASSESSMENT — PAIN SCALES - WONG BAKER
WONGBAKER_NUMERICALRESPONSE: 0

## 2023-05-17 NOTE — PROGRESS NOTES
[facility] [address] Department   Phone: [phone]    Physical Therapy    [x] Initial Evaluation            [] Daily Treatment Note         [] Discharge Summary      Patient: León Leone   : 1945   MRN: [id number]   Date of Service:  2023  Admitting Diagnosis: Syncope  Current Admission Summary: Pt admitted from [facility] due to unwitnessed fall. Past Medical History:  has a past medical history of Alzheimer's disease, unspecified ([address] Arthritis, Aural vertigo, unspecified ear, Cancer ([address] Cataract, Crohn disease ([address] Dementia ([address] Dental bridge present, Dental crowns present, Diabetes mellitus ([address] Ductal carcinoma in situ of left breast, Encounter for loop recorder at end of battery life, Goiter, Hearing impairment, Herniated lumbar intervertebral disc, History of pancreatitis, Hypercholesteremia, Hypertension, IBS (irritable bowel syndrome), Insomnia, unspecified, Intraductal carcinoma in situ of left breast, LBBB (left bundle branch block), Need for assistance with personal care, Noninfective gastroenteritis and colitis, unspecified, Osteoarthritis of foot, Other specified postprocedural states, Personal history of malignant neoplasm of breast, Primary osteoarthritis, Radiculopathy, lumbar region, Recurrent UTI, Right-sided low back pain with right-sided sciatica, Rosacea, Spinal stenosis, Spinal stenosis, lumbar region without neurogenic claudication, Tibialis posterior tendinitis, Type II or unspecified type diabetes mellitus without mention of complication, not stated as uncontrolled, and Unspecified hearing loss, unspecified ear. Past Surgical History:  has a past surgical history that includes Carpal tunnel release; Hysterectomy; Insertable Cardiac Monitor; eye surgery (Bilateral); Breast lumpectomy (12); Breast biopsy (10/5/12); Colonoscopy; Cardiac surgery ();  Axillary Surgery (Left); and Lumbar spine surgery

## 2023-05-17 NOTE — PROGRESS NOTES
Progress Note - Dr. Joceline Seymour - Internal Medicine  PCP: Tamara Garcia, APRN - CNP [address] Poster [phone]    Hospital Day: 1  Code Status: Full Code  Current Diet: ADULT DIET; Regular; 4 carb choices (60 gm/meal)        CC: follow up on medical issues    Subjective:   Yuni Watkins is a [de-identified] y.o. female. Pt seen and examined  Chart reviewed since last visit, labs and imaging below      Doing ok  Pt sleeping now  Cannot get hx nor ros from here 2/2 somnolence, mentation change  Glu better        Review of Systems: (1 system for EPF, 2-9 for detailed, 10+ for comprehensive)  Cannot obtain from pt    I have reviewed the patient's medical and social history in detail and updated the computerized patient record.   To recap: She  has a past medical history of Alzheimer's disease, unspecified ([address] Arthritis, Aural vertigo, unspecified ear, Cancer ([address] Cataract, Crohn disease ([address] Dementia ([address] Dental bridge present, Dental crowns present, Diabetes mellitus ([address] Ductal carcinoma in situ of left breast, Encounter for loop recorder at end of battery life, Goiter, Hearing impairment, Herniated lumbar intervertebral disc, History of pancreatitis, Hypercholesteremia, Hypertension, IBS (irritable bowel syndrome), Insomnia, unspecified, Intraductal carcinoma in situ of left breast, LBBB (left bundle branch block), Need for assistance with personal care, Noninfective gastroenteritis and colitis, unspecified, Osteoarthritis of foot, Other specified postprocedural states, Personal history of malignant neoplasm of breast, Primary osteoarthritis, Radiculopathy, lumbar region, Recurrent UTI, Right-sided low back pain with right-sided sciatica, Rosacea, Spinal stenosis, Spinal stenosis, lumbar region without neurogenic claudication, Tibialis posterior tendinitis, Type II or unspecified type diabetes mellitus without mention of complication, not stated as uncontrolled, and Unspecified hearing loss,

## 2023-05-17 NOTE — PROGRESS NOTES
SLP NOTE  Attempt/Hold Note    Ko Schilling  1945    Second attempt to complete clinical swallow evaluation this date. Pt currently off the unit, at echo per chart review. Will attempt again as pt is able/available to participate.     Cinyd Bearden, [address] [facility]  Speech-Language Pathologist  [phone]

## 2023-05-17 NOTE — PROGRESS NOTES
SLP Attempt  Damaris Hardy  1945    SLP/Clinical Swallow Evaluation order received. Pt is currently lethargic and is unable to be awakened for evaluation at this time. Will re-attempt at a later time as schedule allows.     Oseas Ruiz [facility]-TQV#[id number]

## 2023-05-17 NOTE — PROGRESS NOTES
[facility] [address] Department   Phone: [phone]    Occupational Therapy    [x] Initial Evaluation            [] Daily Treatment Note         [] Discharge Summary      Patient: Alva Gonzalez   : 1945   MRN: [id number]   Date of Service:  2023    Admitting Diagnosis:  Syncope  Current Admission Summary: Pt admitted from [address] due to unwitnessed fall  Past Medical History:  has a past medical history of Alzheimer's disease, unspecified ([address] Arthritis, Aural vertigo, unspecified ear, Cancer ([address] Cataract, Crohn disease ([address] Dementia ([address] Dental bridge present, Dental crowns present, Diabetes mellitus ([address] Ductal carcinoma in situ of left breast, Encounter for loop recorder at end of battery life, Goiter, Hearing impairment, Herniated lumbar intervertebral disc, History of pancreatitis, Hypercholesteremia, Hypertension, IBS (irritable bowel syndrome), Insomnia, unspecified, Intraductal carcinoma in situ of left breast, LBBB (left bundle branch block), Need for assistance with personal care, Noninfective gastroenteritis and colitis, unspecified, Osteoarthritis of foot, Other specified postprocedural states, Personal history of malignant neoplasm of breast, Primary osteoarthritis, Radiculopathy, lumbar region, Recurrent UTI, Right-sided low back pain with right-sided sciatica, Rosacea, Spinal stenosis, Spinal stenosis, lumbar region without neurogenic claudication, Tibialis posterior tendinitis, Type II or unspecified type diabetes mellitus without mention of complication, not stated as uncontrolled, and Unspecified hearing loss, unspecified ear. Past Surgical History:  has a past surgical history that includes Carpal tunnel release; Hysterectomy; Insertable Cardiac Monitor; eye surgery (Bilateral); Breast lumpectomy (12); Breast biopsy (10/5/12); Colonoscopy; Cardiac surgery ();  Axillary Surgery (Left); and Lumbar spine surgery

## 2023-05-18 VITALS
TEMPERATURE: 98.9 F | HEIGHT: 64 IN | DIASTOLIC BLOOD PRESSURE: 71 MMHG | SYSTOLIC BLOOD PRESSURE: 110 MMHG | HEART RATE: 80 BPM | RESPIRATION RATE: 19 BRPM | OXYGEN SATURATION: 98 % | BODY MASS INDEX: 32.06 KG/M2 | WEIGHT: 187.8 LBS

## 2023-05-18 PROBLEM — G45.9 TIA (TRANSIENT ISCHEMIC ATTACK): Status: ACTIVE | Noted: 2023-05-18

## 2023-05-18 LAB
ANION GAP SERPL CALCULATED.3IONS-SCNC: 8 MMOL/L (ref 3–16)
BASOPHILS # BLD: 0.1 K/UL (ref 0–0.2)
BASOPHILS NFR BLD: 0.9 %
BUN SERPL-MCNC: 20 MG/DL (ref 7–20)
CALCIUM SERPL-MCNC: 8.6 MG/DL (ref 8.3–10.6)
CHLORIDE SERPL-SCNC: 108 MMOL/L (ref 99–110)
CO2 SERPL-SCNC: 23 MMOL/L (ref 21–32)
CREAT SERPL-MCNC: 1 MG/DL (ref 0.6–1.2)
DEPRECATED RDW RBC AUTO: 13.9 % (ref 12.4–15.4)
EOSINOPHIL # BLD: 0.3 K/UL (ref 0–0.6)
EOSINOPHIL NFR BLD: 4.7 %
GFR SERPLBLD CREATININE-BSD FMLA CKD-EPI: 57 ML/MIN/{1.73_M2}
GLUCOSE BLD-MCNC: 119 MG/DL (ref 70–99)
GLUCOSE BLD-MCNC: 319 MG/DL (ref 70–99)
GLUCOSE BLD-MCNC: 395 MG/DL (ref 70–99)
GLUCOSE SERPL-MCNC: 150 MG/DL (ref 70–99)
HCT VFR BLD AUTO: 33.6 % (ref 36–48)
HGB BLD-MCNC: 11.4 G/DL (ref 12–16)
LYMPHOCYTES # BLD: 1.1 K/UL (ref 1–5.1)
LYMPHOCYTES NFR BLD: 19 %
MCH RBC QN AUTO: 32.1 PG (ref 26–34)
MCHC RBC AUTO-ENTMCNC: 33.8 G/DL (ref 31–36)
MCV RBC AUTO: 95.1 FL (ref 80–100)
MONOCYTES # BLD: 0.4 K/UL (ref 0–1.3)
MONOCYTES NFR BLD: 6.4 %
NEUTROPHILS # BLD: 3.9 K/UL (ref 1.7–7.7)
NEUTROPHILS NFR BLD: 69 %
PERFORMED ON: ABNORMAL
PLATELET # BLD AUTO: 193 K/UL (ref 135–450)
PMV BLD AUTO: 8.1 FL (ref 5–10.5)
POTASSIUM SERPL-SCNC: 3.6 MMOL/L (ref 3.5–5.1)
RBC # BLD AUTO: 3.54 M/UL (ref 4–5.2)
SODIUM SERPL-SCNC: 139 MMOL/L (ref 136–145)
WBC # BLD AUTO: 5.6 K/UL (ref 4–11)

## 2023-05-18 PROCEDURE — 36415 COLL VENOUS BLD VENIPUNCTURE: CPT

## 2023-05-18 PROCEDURE — 80048 BASIC METABOLIC PNL TOTAL CA: CPT

## 2023-05-18 PROCEDURE — 97530 THERAPEUTIC ACTIVITIES: CPT

## 2023-05-18 PROCEDURE — 97116 GAIT TRAINING THERAPY: CPT

## 2023-05-18 PROCEDURE — 85025 COMPLETE CBC W/AUTO DIFF WBC: CPT

## 2023-05-18 PROCEDURE — 97535 SELF CARE MNGMENT TRAINING: CPT

## 2023-05-18 PROCEDURE — 6370000000 HC RX 637 (ALT 250 FOR IP): Performed by: INTERNAL MEDICINE

## 2023-05-18 PROCEDURE — 2580000003 HC RX 258: Performed by: INTERNAL MEDICINE

## 2023-05-18 PROCEDURE — 92610 EVALUATE SWALLOWING FUNCTION: CPT

## 2023-05-18 PROCEDURE — 92526 ORAL FUNCTION THERAPY: CPT

## 2023-05-18 RX ORDER — NITROFURANTOIN 25; 75 MG/1; MG/1
100 CAPSULE ORAL 2 TIMES DAILY
Qty: 20 CAPSULE | Refills: 0 | Status: SHIPPED | OUTPATIENT
Start: 2023-05-18 | End: 2023-05-23 | Stop reason: ALTCHOICE

## 2023-05-18 RX ORDER — PROCHLORPERAZINE MALEATE 10 MG
10 TABLET ORAL EVERY 6 HOURS PRN
Qty: 120 TABLET | Refills: 3 | Status: SHIPPED | OUTPATIENT
Start: 2023-05-18 | End: 2023-05-23 | Stop reason: ALTCHOICE

## 2023-05-18 RX ORDER — LORAZEPAM 1 MG/1
1 TABLET ORAL EVERY 4 HOURS PRN
Qty: 20 TABLET | Refills: 0 | Status: SHIPPED | OUTPATIENT
Start: 2023-05-18 | End: 2023-05-23 | Stop reason: ALTCHOICE

## 2023-05-18 RX ORDER — HYDROXYZINE HYDROCHLORIDE 25 MG/1
25 TABLET, FILM COATED ORAL 3 TIMES DAILY PRN
Status: DISCONTINUED | OUTPATIENT
Start: 2023-05-18 | End: 2023-05-18 | Stop reason: HOSPADM

## 2023-05-18 RX ORDER — LORAZEPAM 1 MG/1
1 TABLET ORAL EVERY 4 HOURS PRN
Status: DISCONTINUED | OUTPATIENT
Start: 2023-05-18 | End: 2023-05-18 | Stop reason: HOSPADM

## 2023-05-18 RX ORDER — INSULIN GLARGINE 100 [IU]/ML
25 INJECTION, SOLUTION SUBCUTANEOUS NIGHTLY
Qty: 10 ML | Refills: 3 | Status: SHIPPED | OUTPATIENT
Start: 2023-05-18

## 2023-05-18 RX ORDER — INSULIN GLARGINE 100 [IU]/ML
25 INJECTION, SOLUTION SUBCUTANEOUS NIGHTLY
Status: DISCONTINUED | OUTPATIENT
Start: 2023-05-18 | End: 2023-05-18 | Stop reason: HOSPADM

## 2023-05-18 RX ORDER — INSULIN LISPRO 100 [IU]/ML
5 INJECTION, SOLUTION INTRAVENOUS; SUBCUTANEOUS
Status: DISCONTINUED | OUTPATIENT
Start: 2023-05-18 | End: 2023-05-18 | Stop reason: HOSPADM

## 2023-05-18 RX ORDER — INSULIN LISPRO 100 [IU]/ML
5 INJECTION, SOLUTION INTRAVENOUS; SUBCUTANEOUS
Qty: 1 EACH | Refills: 0 | Status: SHIPPED | OUTPATIENT
Start: 2023-05-18

## 2023-05-18 RX ORDER — NITROFURANTOIN 25; 75 MG/1; MG/1
100 CAPSULE ORAL 2 TIMES DAILY
Qty: 20 CAPSULE | Refills: 0 | Status: SHIPPED | OUTPATIENT
Start: 2023-05-18 | End: 2023-05-18 | Stop reason: SDUPTHER

## 2023-05-18 RX ORDER — INSULIN LISPRO 100 [IU]/ML
0-8 INJECTION, SOLUTION INTRAVENOUS; SUBCUTANEOUS
Qty: 1 EACH | Refills: 0 | Status: SHIPPED | OUTPATIENT
Start: 2023-05-18 | End: 2023-05-23 | Stop reason: ALTCHOICE

## 2023-05-18 RX ORDER — INSULIN LISPRO 100 [IU]/ML
0-4 INJECTION, SOLUTION INTRAVENOUS; SUBCUTANEOUS
Qty: 1 EACH | Refills: 3 | Status: SHIPPED | OUTPATIENT
Start: 2023-05-18 | End: 2023-05-23 | Stop reason: ALTCHOICE

## 2023-05-18 RX ORDER — HYDROXYZINE HYDROCHLORIDE 25 MG/1
25 TABLET, FILM COATED ORAL 3 TIMES DAILY PRN
Qty: 30 TABLET | Refills: 0 | Status: SHIPPED | OUTPATIENT
Start: 2023-05-18 | End: 2023-05-23 | Stop reason: ALTCHOICE

## 2023-05-18 RX ADMIN — ACETAMINOPHEN 650 MG: 325 TABLET ORAL at 09:52

## 2023-05-18 RX ADMIN — RISPERIDONE 0.5 MG: 0.25 TABLET, FILM COATED ORAL at 09:52

## 2023-05-18 RX ADMIN — FLUOXETINE 10 MG: 10 CAPSULE ORAL at 09:53

## 2023-05-18 RX ADMIN — LISINOPRIL 20 MG: 20 TABLET ORAL at 09:53

## 2023-05-18 RX ADMIN — INSULIN LISPRO 5 UNITS: 100 INJECTION, SOLUTION INTRAVENOUS; SUBCUTANEOUS at 17:36

## 2023-05-18 RX ADMIN — LORAZEPAM 1 MG: 1 TABLET ORAL at 15:24

## 2023-05-18 RX ADMIN — HYDROXYZINE HYDROCHLORIDE 25 MG: 25 TABLET, FILM COATED ORAL at 15:24

## 2023-05-18 RX ADMIN — AMLODIPINE BESYLATE 5 MG: 5 TABLET ORAL at 09:53

## 2023-05-18 RX ADMIN — HYDROCHLOROTHIAZIDE 12.5 MG: 25 TABLET ORAL at 09:53

## 2023-05-18 RX ADMIN — INSULIN LISPRO 8 UNITS: 100 INJECTION, SOLUTION INTRAVENOUS; SUBCUTANEOUS at 11:33

## 2023-05-18 RX ADMIN — INSULIN LISPRO 6 UNITS: 100 INJECTION, SOLUTION INTRAVENOUS; SUBCUTANEOUS at 17:36

## 2023-05-18 RX ADMIN — Medication 10 ML: at 11:34

## 2023-05-18 NOTE — PROGRESS NOTES
Patient seen , discharge dictated scripts given , arrangements made , LEON completed .  Discussed with nursing staff  And   If applicable ,  Discussed with  Patient's family , all questions answered and concerns addressed  When applicable   Hospital Problems             Last Modified POA    * (Principal) Syncope 5/16/2023 Yes    Alzheimer's dementia ([facility] 5/16/2023 Yes    Uncontrolled type 2 diabetes mellitus with hyperglycemia ([facility] 5/16/2023 Yes    Depression 5/16/2023 Yes    Syncope and collapse 5/16/2023 Yes

## 2023-05-18 NOTE — CARE COORDINATION
Discharge Planning:     (DEBI) called Dorothy Oliva [phone] with [facility]. She advised Patient is from there Assisted Living side. CM advised she will need to come back SNF and asked her to start the pre-cert which she advised she would. CM team to follow. Electronically signed by MAGNOLIA Damon on 5/17/2023 at 2:28 PM      UPDATE AT 1629:    DEBI spoke with Patient's son, Marquis Holm who advised Patient is from the [address] [place] [address] Unit and that he would prefer her to go back to that unit vs. SNF. He advised she has done the skilled side before and that it wasn't great for her. She is comfortable where she is and she is getting a higher level of care on that unit. CM called Dorothy Oliva and asked her to cancel the SNF pre-cert and that she will come back to the AL side if that is okay with them. She advised that was fine. CM team to follow.      Electronically signed by MAGNOLAI Damon on 5/17/2023 at 4:32 PM
Discharge Planning:     (DEBI) spoke with Dorothy Oliva at [facility] who advised there is no pre-cert to come back to AL as Patient is private pay. CM advised she believes Patient will be D/C today and that she will call back and update her.        Electronically signed by MAGNOLIA Damon on 5/18/2023 at 8:49 AM
[address] [address]  Phone: [phone] Fax: [address], Justen Santiago [address] [facility] [address]  [address] [facility] [address]  Phone: [phone] Fax: [phone]    [facility] [address]  [address] [facility] [address]  Phone: [phone] Fax: [address] [facility] [facility] [phone] [facility] [phone]  [address] [facility] [address]  Phone: [phone] Fax: [phone]      Notes: Additional Case Management Notes: No additional needs.        Electronically signed by MAGNOLIA Avalos on 5/18/2023 at 3:26 PM

## 2023-05-18 NOTE — PROGRESS NOTES
Facility/Department: [address]  Speech Language Pathology  DYSPHAGIA BEDSIDE SWALLOW EVALUATION     Patient: Ovidio Du   : 1945   MRN: [id number]      Evaluation Date: 2023   Admitting Diagnosis: Syncope and collapse [R55]  Hyperglycemia [R73.9]  Elevated troponin [R77.8]  Fall, initial encounter [W19. XXXA]  Pain: Denies                                                       H&P: Ovidio Du is a [de-identified] y.o. female who has a past medical history of Alzheimer's disease, unspecified ([address] Arthritis, Aural vertigo, unspecified ear, Cancer ([address] Cataract, Crohn disease ([address] Dementia ([address] Dental bridge present, Dental crowns present, Diabetes mellitus ([address] Ductal carcinoma in situ of left breast, Encounter for loop recorder at end of battery life, Goiter, Hearing impairment, Herniated lumbar intervertebral disc, History of pancreatitis, Hypercholesteremia, Hypertension, IBS (irritable bowel syndrome), Insomnia, unspecified, Intraductal carcinoma in situ of left breast, LBBB (left bundle branch block), Need for assistance with personal care, Noninfective gastroenteritis and colitis, unspecified, Osteoarthritis of foot, Other specified postprocedural states, Personal history of malignant neoplasm of breast, Primary osteoarthritis, Radiculopathy, lumbar region, Recurrent UTI, Right-sided low back pain with right-sided sciatica, Rosacea, Spinal stenosis, Spinal stenosis, lumbar region without neurogenic claudication, Tibialis posterior tendinitis, Type II or unspecified type diabetes mellitus without mention of complication, not stated as uncontrolled, and Unspecified hearing loss, unspecified ear. Imaging:  Chest X-ray: FINDINGS:  The lungs are without acute focal process. There is no effusion or  pneumothorax. The cardiomediastinal silhouette is stable. The osseous  structures are stable. IMPRESSION:  No acute process. Head CT:    IMPRESSION:  No acute intracranial

## 2023-05-18 NOTE — PROGRESS NOTES
[facility] [address] Department   Phone: [phone]    Physical Therapy    [] Initial Evaluation            [x] Daily Treatment Note         [] Discharge Summary      Patient: Giovanny Cerrato   : 1945   MRN: [id number]   Date of Service:  2023  Admitting Diagnosis: Syncope  Current Admission Summary: Pt admitted from [facility] due to unwitnessed fall. Past Medical History:  has a past medical history of Alzheimer's disease, unspecified ([address] Arthritis, Aural vertigo, unspecified ear, Cancer ([address] Cataract, Crohn disease ([address] Dementia ([address] Dental bridge present, Dental crowns present, Diabetes mellitus ([address] Ductal carcinoma in situ of left breast, Encounter for loop recorder at end of battery life, Goiter, Hearing impairment, Herniated lumbar intervertebral disc, History of pancreatitis, Hypercholesteremia, Hypertension, IBS (irritable bowel syndrome), Insomnia, unspecified, Intraductal carcinoma in situ of left breast, LBBB (left bundle branch block), Need for assistance with personal care, Noninfective gastroenteritis and colitis, unspecified, Osteoarthritis of foot, Other specified postprocedural states, Personal history of malignant neoplasm of breast, Primary osteoarthritis, Radiculopathy, lumbar region, Recurrent UTI, Right-sided low back pain with right-sided sciatica, Rosacea, Spinal stenosis, Spinal stenosis, lumbar region without neurogenic claudication, Tibialis posterior tendinitis, Type II or unspecified type diabetes mellitus without mention of complication, not stated as uncontrolled, and Unspecified hearing loss, unspecified ear. Past Surgical History:  has a past surgical history that includes Carpal tunnel release; Hysterectomy; Insertable Cardiac Monitor; eye surgery (Bilateral); Breast lumpectomy (12); Breast biopsy (10/5/12); Colonoscopy; Cardiac surgery ();  Axillary Surgery (Left); and Lumbar spine surgery

## 2023-05-18 NOTE — PROGRESS NOTES
[facility] [address] Department   Phone: [phone]    Occupational Therapy    [] Initial Evaluation            [x] Daily Treatment Note         [] Discharge Summary      Patient: Damaris Hardy   : 1945   MRN: [id number]   Date of Service:  2023    Admitting Diagnosis:  Syncope  Current Admission Summary: Pt admitted from [address] due to unwitnessed fall  Past Medical History:  has a past medical history of Alzheimer's disease, unspecified ([address] Arthritis, Aural vertigo, unspecified ear, Cancer ([address] Cataract, Crohn disease ([address] Dementia ([address] Dental bridge present, Dental crowns present, Diabetes mellitus ([address] Ductal carcinoma in situ of left breast, Encounter for loop recorder at end of battery life, Goiter, Hearing impairment, Herniated lumbar intervertebral disc, History of pancreatitis, Hypercholesteremia, Hypertension, IBS (irritable bowel syndrome), Insomnia, unspecified, Intraductal carcinoma in situ of left breast, LBBB (left bundle branch block), Need for assistance with personal care, Noninfective gastroenteritis and colitis, unspecified, Osteoarthritis of foot, Other specified postprocedural states, Personal history of malignant neoplasm of breast, Primary osteoarthritis, Radiculopathy, lumbar region, Recurrent UTI, Right-sided low back pain with right-sided sciatica, Rosacea, Spinal stenosis, Spinal stenosis, lumbar region without neurogenic claudication, Tibialis posterior tendinitis, Type II or unspecified type diabetes mellitus without mention of complication, not stated as uncontrolled, and Unspecified hearing loss, unspecified ear. Past Surgical History:  has a past surgical history that includes Carpal tunnel release; Hysterectomy; Insertable Cardiac Monitor; eye surgery (Bilateral); Breast lumpectomy (12); Breast biopsy (10/5/12); Colonoscopy; Cardiac surgery ();  Axillary Surgery (Left); and Lumbar spine surgery

## 2023-05-18 NOTE — DISCHARGE INSTR - COC
Yes  Urinary Catheter: None   Colostomy/Ileostomy/Ileal Conduit: No       Date of Last BM: 5/17/2023    Intake/Output Summary (Last 24 hours) at 5/18/2023 1443  Last data filed at 5/17/2023 2350  Gross per 24 hour   Intake --   Output 500 ml   Net -500 ml     I/O last 3 completed shifts:  In: -   Out: 600 [Urine:600]    Safety Concerns:     Sundowners [place] and At Risk for Falls    Impairments/Disabilities:      None    Nutrition Therapy:  Current Nutrition Therapy:   - Oral Diet:  General    Routes of Feeding: Oral  Liquids: No Restrictions  Daily Fluid Restriction: no  Last Modified Barium Swallow with Video (Video Swallowing Test): not done    Treatments at the Time of Hospital Discharge:   Respiratory Treatments: n/a  Oxygen Therapy:  is not on home oxygen therapy.   Ventilator:    - No ventilator support    Rehab Therapies: PT/OT  Weight Bearing Status/Restrictions: No weight bearing restrictions  Other Medical Equipment (for information only, NOT a DME order):  walker  Other Treatments: n/a    Patient's personal belongings (please select all that are sent with patient):  None    RN SIGNATURE:  Electronically signed by Cesar Franz RN on 5/18/23 at 3:31 PM EDT    CASE MANAGEMENT/SOCIAL WORK SECTION    Inpatient Status Date: 5/16/2023    Readmission Risk Assessment Score: 11%  Readmission Risk              Risk of Unplanned Readmission:  19           Discharging to [facility] of [address], [address]  Phone: [phone]  Fax: [phone]      / signature: Electronically signed by MAGNOLIA Deleon on 5/18/2023 at 2:55 PM      PHYSICIAN SECTION    Prognosis: Guarded    Condition at Discharge: Stable    Rehab Potential (if transferring to Rehab): Guarded    Recommended Labs or Other Treatments After Discharge: P T OT    Physician Certification: I certify the above information and transfer of Damaris Rings  is necessary DISPLAY PLAN FREE TEXT

## 2023-05-19 NOTE — DISCHARGE SUMMARY
[place]\[facility] [place] [facility]\"" 124                     [address] [facility], [address]                               DISCHARGE SUMMARY    PATIENT NAME: Gallo Paige                  :        1945  MED REC NO:   [id number]                          ROOM:       3365  ACCOUNT NO:   [de-identified]                           ADMIT DATE: 2023  PROVIDER:     Dmitry Espino MD                  DISCHARGE DATE:  2023    FINAL DIAGNOSES:  1. TIA. 2.  Syncope. 3.  Alzheimer's dementia. 4.  Uncontrolled type 2 diabetes mellitus. 5.  Hypertension. 6.  Depression. DISCHARGE MEDICATIONS:  1. Atarax 25 mg p.o. t.i.d. p.r.n.  2.  Glargine 75 units nightly. 3.  Humalog 0-8 units three times a day with meals and _____ units with  every meal.  4.  Lorazepam 1 mg every 4 hours p.r.n.  5.  Aricept 5 mg nightly. 6.  Nolvadex 10 mg two tablets daily. 7.  Tylenol 650 mg q.4 p.r.n. HOSPITAL COURSE:  This 25-year-old white American lady came to the  emergency room with history of syncope and symptoms suggestive of TIA. She had uncontrolled diabetes. She also has a relatively normal  neurologic examination. The patient was admitted by Dr. Bertin Mendez and I  followed the patient on the last visit. The patient underwent CAT scan  of the head that was showing negative for any acute intracranial  finding. A transthoracic echocardiogram did not reveal any major  valvular dysfunction, ejection fraction was 55%. The patient underwent  neuro checks. The patient also had severe pruritus for which the  patient was given hydroxyzine. CT scan of the head and cervical spine  were unremarkable. Moderate cerebral atrophy and CSF collection noted  in the left Sylvian fissure keeping in mind an arachnoid cyst without  any acute abnormality of the cervical spine. There is an enlarged  heterogeneous thyroid. Chest x-ray showed no acute process.   The  patient underwent PT, OT and speech eval.

## 2023-05-19 NOTE — TELEPHONE ENCOUNTER
Patients son is calling stating she is positive for a uti as of yesterday. Patient was in the ED and they sent over [facility] for patient but son said the pharmacy never received this. Are we able to send this over to [address] [facility], [address]?       Please advise and f/u with pharmacy

## 2023-05-20 RX ORDER — NITROFURANTOIN 25; 75 MG/1; MG/1
100 CAPSULE ORAL 2 TIMES DAILY
Qty: 20 CAPSULE | Refills: 0 | Status: SHIPPED | OUTPATIENT
Start: 2023-05-20 | End: 2023-05-30

## 2023-05-22 ENCOUNTER — TELEPHONE (OUTPATIENT)
Dept: FAMILY MEDICINE CLINIC | Age: 78
End: 2023-05-22

## 2023-05-22 NOTE — PROGRESS NOTES
Physician Progress Note      Ronny Thomson  [facility] #:                  [id number]  :                       1945  ADMIT DATE:       2023 10:38 AM  DISCH DATE:        2023 7:40 PM  RESPONDING  PROVIDER #:        Bárbara Monique MD          QUERY TEXT:    Patient admitted with Syncope. Patient noted to have Uncontrolled type 2   diabetes mellitus. If possible, please document in progress notes and   discharge summary after study the etiology of the syncope: The medical record reflects the following:  Risk Factors: Uncontrolled DM  Clinical Indicators: per H/P \"EMS reports glucose of 371, history of diabetes. Medication compliance is unknown\", Hemoglobin  A1C=9.5%  Treatment: CBC, CMP, Head CT, 12-lead EKG, ECHO, Orthostatic blood pressure   and pulse  Options provided:  -- Syncope due to orthostatic hypotension secondary to diabetic autonomic   neuropathy  -- Syncope due to other, please specify. -- Other - I will add my own diagnosis  -- Disagree - Not applicable / Not valid  -- Disagree - Clinically unable to determine / Unknown  -- Refer to Clinical Documentation Reviewer    PROVIDER RESPONSE TEXT:    The syncope is due to unknown cause probably Vaso Vagal attack    Query created by: Tatiana Freeman on 2023 1:09 PM      Electronically signed by:   Bárbara Monique MD 2023 1:53 PM

## 2023-05-22 NOTE — TELEPHONE ENCOUNTER
Care Transitions Initial Follow Up Call    Outreach made within 2 business days of discharge: Yes    Patient: Rony Durbin Patient : 1945   MRN: [id number]  Reason for Admission: There are no discharge diagnoses documented for the most recent discharge. Discharge Date: 23       Spoke with: Son, will have patient call back to scheduled. Discharge department/facility: [address] [place] [address] Interactive Patient Contact:  Was patient able to fill all prescriptions: Yes  Was patient instructed to bring all medications to the follow-up visit: Yes  Is patient taking all medications as directed in the discharge summary? No  Does patient understand their discharge instructions: Yes  Does patient have questions or concerns that need addressed prior to 7-14 day follow up office visit: no    Scheduled appointment with PCP within 7-14 days    Follow Up  No future appointments.     Rosaura Springer LPN

## 2023-05-22 NOTE — TELEPHONE ENCOUNTER
Son states he is in the middle of traveling. He will give our office a call back when he is able to schedule hospital follow up.

## 2023-05-23 ENCOUNTER — OFFICE VISIT (OUTPATIENT)
Dept: FAMILY MEDICINE CLINIC | Age: 78
End: 2023-05-23
Payer: MEDICARE

## 2023-05-23 VITALS
WEIGHT: 185 LBS | OXYGEN SATURATION: 98 % | SYSTOLIC BLOOD PRESSURE: 132 MMHG | HEART RATE: 61 BPM | BODY MASS INDEX: 31.76 KG/M2 | DIASTOLIC BLOOD PRESSURE: 64 MMHG

## 2023-05-23 DIAGNOSIS — G45.9 TIA (TRANSIENT ISCHEMIC ATTACK): Primary | ICD-10-CM

## 2023-05-23 DIAGNOSIS — F32.89 OTHER DEPRESSION: ICD-10-CM

## 2023-05-23 DIAGNOSIS — R55 SYNCOPE, UNSPECIFIED SYNCOPE TYPE: ICD-10-CM

## 2023-05-23 DIAGNOSIS — E11.65 UNCONTROLLED TYPE 2 DIABETES MELLITUS WITH HYPERGLYCEMIA (HCC): ICD-10-CM

## 2023-05-23 DIAGNOSIS — Z09 HOSPITAL DISCHARGE FOLLOW-UP: ICD-10-CM

## 2023-05-23 DIAGNOSIS — I10 ESSENTIAL HYPERTENSION: ICD-10-CM

## 2023-05-23 DIAGNOSIS — G30.9 MODERATE ALZHEIMER'S DEMENTIA WITH AGITATION, UNSPECIFIED TIMING OF DEMENTIA ONSET (HCC): ICD-10-CM

## 2023-05-23 DIAGNOSIS — F02.B11 MODERATE ALZHEIMER'S DEMENTIA WITH AGITATION, UNSPECIFIED TIMING OF DEMENTIA ONSET (HCC): ICD-10-CM

## 2023-05-23 PROBLEM — M20.30 ACQUIRED HALLUX MALLEUS: Status: ACTIVE | Noted: 2023-05-23

## 2023-05-23 PROCEDURE — 3046F HEMOGLOBIN A1C LEVEL >9.0%: CPT | Performed by: NURSE PRACTITIONER

## 2023-05-23 PROCEDURE — 3078F DIAST BP <80 MM HG: CPT | Performed by: NURSE PRACTITIONER

## 2023-05-23 PROCEDURE — 99214 OFFICE O/P EST MOD 30 MIN: CPT | Performed by: NURSE PRACTITIONER

## 2023-05-23 PROCEDURE — 1123F ACP DISCUSS/DSCN MKR DOCD: CPT | Performed by: NURSE PRACTITIONER

## 2023-05-23 PROCEDURE — 3075F SYST BP GE 130 - 139MM HG: CPT | Performed by: NURSE PRACTITIONER

## 2023-05-23 PROCEDURE — 1111F DSCHRG MED/CURRENT MED MERGE: CPT | Performed by: NURSE PRACTITIONER

## 2023-05-23 RX ORDER — ACETAMINOPHEN 500 MG
500 TABLET ORAL EVERY 6 HOURS PRN
Qty: 120 TABLET | Status: SHIPPED | COMMUNITY
Start: 2023-05-23 | End: 2023-05-23 | Stop reason: SDUPTHER

## 2023-05-23 RX ORDER — ACETAMINOPHEN 500 MG
500 TABLET ORAL EVERY 6 HOURS PRN
Qty: 120 TABLET | COMMUNITY
Start: 2023-05-23

## 2023-05-23 SDOH — ECONOMIC STABILITY: INCOME INSECURITY: HOW HARD IS IT FOR YOU TO PAY FOR THE VERY BASICS LIKE FOOD, HOUSING, MEDICAL CARE, AND HEATING?: NOT HARD AT ALL

## 2023-05-23 SDOH — ECONOMIC STABILITY: FOOD INSECURITY: WITHIN THE PAST 12 MONTHS, YOU WORRIED THAT YOUR FOOD WOULD RUN OUT BEFORE YOU GOT MONEY TO BUY MORE.: NEVER TRUE

## 2023-05-23 SDOH — ECONOMIC STABILITY: FOOD INSECURITY: WITHIN THE PAST 12 MONTHS, THE FOOD YOU BOUGHT JUST DIDN'T LAST AND YOU DIDN'T HAVE MONEY TO GET MORE.: NEVER TRUE

## 2023-05-23 SDOH — ECONOMIC STABILITY: HOUSING INSECURITY
IN THE LAST 12 MONTHS, WAS THERE A TIME WHEN YOU DID NOT HAVE A STEADY PLACE TO SLEEP OR SLEPT IN A SHELTER (INCLUDING NOW)?: NO

## 2023-05-23 ASSESSMENT — PATIENT HEALTH QUESTIONNAIRE - PHQ9: DEPRESSION UNABLE TO ASSESS: FUNCTIONAL CAPACITY MOTIVATION LIMITS ACCURACY

## 2023-05-23 NOTE — ASSESSMENT & PLAN NOTE
Uncontrolled, continue current medications   Continue lantus 25 units nightly and humalog 5 units with meals.    Continue CGM  Repeat A1c in 4 months

## 2023-05-23 NOTE — PATIENT INSTRUCTIONS
I am continuing lantus 25 units nightly and humalog 5 units with meals.  She must eat at least 50% of meal.

## 2023-05-30 ENCOUNTER — PATIENT MESSAGE (OUTPATIENT)
Dept: FAMILY MEDICINE CLINIC | Age: 78
End: 2023-05-30

## 2023-05-30 DIAGNOSIS — E11.9 TYPE 2 DIABETES MELLITUS WITHOUT COMPLICATION, WITH LONG-TERM CURRENT USE OF INSULIN (HCC): ICD-10-CM

## 2023-05-30 DIAGNOSIS — I10 ESSENTIAL HYPERTENSION: ICD-10-CM

## 2023-05-30 DIAGNOSIS — F32.89 OTHER DEPRESSION: ICD-10-CM

## 2023-05-30 DIAGNOSIS — F02.B11 MODERATE ALZHEIMER'S DEMENTIA WITH AGITATION, UNSPECIFIED TIMING OF DEMENTIA ONSET (HCC): ICD-10-CM

## 2023-05-30 DIAGNOSIS — G30.9 MODERATE ALZHEIMER'S DEMENTIA WITH AGITATION, UNSPECIFIED TIMING OF DEMENTIA ONSET (HCC): ICD-10-CM

## 2023-05-30 DIAGNOSIS — Z79.4 TYPE 2 DIABETES MELLITUS WITHOUT COMPLICATION, WITH LONG-TERM CURRENT USE OF INSULIN (HCC): ICD-10-CM

## 2023-05-31 RX ORDER — RISPERIDONE 0.5 MG/1
0.5 TABLET ORAL 2 TIMES DAILY
Qty: 180 TABLET | Refills: 3 | Status: ON HOLD | OUTPATIENT
Start: 2023-05-31 | End: 2024-05-30

## 2023-05-31 RX ORDER — DONEPEZIL HYDROCHLORIDE 5 MG/1
5 TABLET, FILM COATED ORAL NIGHTLY
Qty: 90 TABLET | Refills: 3 | Status: ON HOLD | OUTPATIENT
Start: 2023-05-31 | End: 2024-05-30

## 2023-05-31 RX ORDER — LISINOPRIL AND HYDROCHLOROTHIAZIDE 20; 12.5 MG/1; MG/1
2 TABLET ORAL DAILY
Qty: 180 TABLET | Refills: 3 | Status: SHIPPED | OUTPATIENT
Start: 2023-05-31 | End: 2024-05-30

## 2023-05-31 RX ORDER — FLUOXETINE 10 MG/1
10 TABLET, FILM COATED ORAL DAILY
Qty: 90 TABLET | Refills: 3 | Status: ON HOLD | OUTPATIENT
Start: 2023-05-31 | End: 2024-05-30

## 2023-05-31 RX ORDER — ACETAMINOPHEN 500 MG
500 TABLET ORAL EVERY 6 HOURS PRN
Qty: 120 TABLET | Refills: 3 | Status: SHIPPED | OUTPATIENT
Start: 2023-05-31

## 2023-05-31 RX ORDER — NICOTINE POLACRILEX 4 MG
15 LOZENGE BUCCAL SEE ADMIN INSTRUCTIONS
Qty: 45 G | Refills: 1 | Status: SHIPPED | OUTPATIENT
Start: 2023-05-31

## 2023-05-31 RX ORDER — AMLODIPINE BESYLATE 5 MG/1
5 TABLET ORAL DAILY
Qty: 90 TABLET | Refills: 3 | Status: ON HOLD | OUTPATIENT
Start: 2023-05-31 | End: 2024-05-30

## 2023-05-31 RX ORDER — FLASH GLUCOSE SENSOR
KIT MISCELLANEOUS
Qty: 12 EACH | Refills: 3 | Status: SHIPPED | OUTPATIENT
Start: 2023-05-31

## 2023-05-31 RX ORDER — INSULIN GLARGINE 100 [IU]/ML
25 INJECTION, SOLUTION SUBCUTANEOUS NIGHTLY
Qty: 15 ADJUSTABLE DOSE PRE-FILLED PEN SYRINGE | Refills: 3 | Status: ON HOLD | OUTPATIENT
Start: 2023-05-31

## 2023-05-31 RX ORDER — INSULIN LISPRO 100 [IU]/ML
5 INJECTION, SOLUTION INTRAVENOUS; SUBCUTANEOUS
Qty: 1 EACH | Refills: 0 | Status: ON HOLD | OUTPATIENT
Start: 2023-05-31

## 2023-05-31 NOTE — TELEPHONE ENCOUNTER
From: Sheldon Flynn  To: Harrel Councilman  Sent: 2023 8:18 PM EDT  Subject: Medication    Hi Rodrick Dennis - my moms new memory care unit needs prescriptions for all of her meds. [facility] ([facility] to fill her prescriptions. Please let me know how we get this done. She has some meds left but her Shonda Safe patch is almost . Also I talked the nurses and wanted to share their concern of timing of her insulin. She is sleeping a lot and getting it timed with meals is difficult at times. Please call and I can explain further.     Thanks    Jeannette Schwartz  [phone] Sent to pharmacy

## 2023-06-04 ENCOUNTER — APPOINTMENT (OUTPATIENT)
Dept: CT IMAGING | Age: 78
End: 2023-06-04
Payer: MEDICARE

## 2023-06-04 ENCOUNTER — APPOINTMENT (OUTPATIENT)
Dept: GENERAL RADIOLOGY | Age: 78
End: 2023-06-04
Payer: MEDICARE

## 2023-06-04 ENCOUNTER — HOSPITAL ENCOUNTER (OUTPATIENT)
Age: 78
Setting detail: OBSERVATION
Discharge: NURSING, REHAB OR LONG-TERM CARE | End: 2023-06-06
Attending: INTERNAL MEDICINE | Admitting: INTERNAL MEDICINE
Payer: MEDICARE
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
## 2023-06-04 DIAGNOSIS — R26.2 INABILITY TO WALK: Primary | ICD-10-CM

## 2023-06-04 PROBLEM — E86.0 DEHYDRATION: Status: ACTIVE | Noted: 2023-06-04

## 2023-06-04 LAB
ALBUMIN SERPL-MCNC: 3.5 G/DL (ref 3.4–5)
ALBUMIN/GLOB SERPL: 1.3 {RATIO} (ref 1.1–2.2)
ALP SERPL-CCNC: 97 U/L (ref 40–129)
ALT SERPL-CCNC: 9 U/L (ref 10–40)
ANION GAP SERPL CALCULATED.3IONS-SCNC: 16 MMOL/L (ref 3–16)
AST SERPL-CCNC: 19 U/L (ref 15–37)
BASOPHILS # BLD: 0.1 K/UL (ref 0–0.2)
BASOPHILS NFR BLD: 0.8 %
BILIRUB SERPL-MCNC: <0.2 MG/DL (ref 0–1)
BILIRUB UR QL STRIP.AUTO: NEGATIVE
BUN SERPL-MCNC: 30 MG/DL (ref 7–20)
CALCIUM SERPL-MCNC: 8.9 MG/DL (ref 8.3–10.6)
CHLORIDE SERPL-SCNC: 102 MMOL/L (ref 99–110)
CK SERPL-CCNC: 68 U/L (ref 26–192)
CLARITY UR: CLEAR
CO2 SERPL-SCNC: 19 MMOL/L (ref 21–32)
COLOR UR: YELLOW
CREAT SERPL-MCNC: 1.3 MG/DL (ref 0.6–1.2)
DEPRECATED RDW RBC AUTO: 14 % (ref 12.4–15.4)
EOSINOPHIL # BLD: 0.2 K/UL (ref 0–0.6)
EOSINOPHIL NFR BLD: 2.5 %
GFR SERPLBLD CREATININE-BSD FMLA CKD-EPI: 42 ML/MIN/{1.73_M2}
GLUCOSE SERPL-MCNC: 97 MG/DL (ref 70–99)
GLUCOSE UR STRIP.AUTO-MCNC: 500 MG/DL
HCT VFR BLD AUTO: 36 % (ref 36–48)
HGB BLD-MCNC: 11.8 G/DL (ref 12–16)
HGB UR QL STRIP.AUTO: NEGATIVE
KETONES UR STRIP.AUTO-MCNC: ABNORMAL MG/DL
LEUKOCYTE ESTERASE UR QL STRIP.AUTO: NEGATIVE
LYMPHOCYTES # BLD: 0.8 K/UL (ref 1–5.1)
LYMPHOCYTES NFR BLD: 12.6 %
MCH RBC QN AUTO: 31.4 PG (ref 26–34)
MCHC RBC AUTO-ENTMCNC: 32.9 G/DL (ref 31–36)
MCV RBC AUTO: 95.4 FL (ref 80–100)
MONOCYTES # BLD: 0.5 K/UL (ref 0–1.3)
MONOCYTES NFR BLD: 7.6 %
NEUTROPHILS # BLD: 5.1 K/UL (ref 1.7–7.7)
NEUTROPHILS NFR BLD: 76.5 %
NITRITE UR QL STRIP.AUTO: NEGATIVE
PH UR STRIP.AUTO: 7 [PH] (ref 5–8)
PLATELET # BLD AUTO: 301 K/UL (ref 135–450)
PMV BLD AUTO: 8.7 FL (ref 5–10.5)
POTASSIUM SERPL-SCNC: 4.7 MMOL/L (ref 3.5–5.1)
PROT SERPL-MCNC: 6.2 G/DL (ref 6.4–8.2)
PROT UR STRIP.AUTO-MCNC: NEGATIVE MG/DL
RBC # BLD AUTO: 3.77 M/UL (ref 4–5.2)
SODIUM SERPL-SCNC: 137 MMOL/L (ref 136–145)
SP GR UR STRIP.AUTO: 1.02 (ref 1–1.03)
TROPONIN, HIGH SENSITIVITY: 38 NG/L (ref 0–14)
UA COMPLETE W REFLEX CULTURE PNL UR: ABNORMAL
UA DIPSTICK W REFLEX MICRO PNL UR: ABNORMAL
URN SPEC COLLECT METH UR: ABNORMAL
UROBILINOGEN UR STRIP-ACNC: 1 E.U./DL
WBC # BLD AUTO: 6.7 K/UL (ref 4–11)

## 2023-06-04 PROCEDURE — G0378 HOSPITAL OBSERVATION PER HR: HCPCS

## 2023-06-04 PROCEDURE — 84484 ASSAY OF TROPONIN QUANT: CPT

## 2023-06-04 PROCEDURE — 71045 X-RAY EXAM CHEST 1 VIEW: CPT

## 2023-06-04 PROCEDURE — 96375 TX/PRO/DX INJ NEW DRUG ADDON: CPT

## 2023-06-04 PROCEDURE — 99285 EMERGENCY DEPT VISIT HI MDM: CPT

## 2023-06-04 PROCEDURE — 82550 ASSAY OF CK (CPK): CPT

## 2023-06-04 PROCEDURE — 36415 COLL VENOUS BLD VENIPUNCTURE: CPT

## 2023-06-04 PROCEDURE — 81003 URINALYSIS AUTO W/O SCOPE: CPT

## 2023-06-04 PROCEDURE — 72125 CT NECK SPINE W/O DYE: CPT

## 2023-06-04 PROCEDURE — 93005 ELECTROCARDIOGRAM TRACING: CPT | Performed by: PHYSICIAN ASSISTANT

## 2023-06-04 PROCEDURE — 85025 COMPLETE CBC W/AUTO DIFF WBC: CPT

## 2023-06-04 PROCEDURE — 73502 X-RAY EXAM HIP UNI 2-3 VIEWS: CPT

## 2023-06-04 PROCEDURE — 80053 COMPREHEN METABOLIC PANEL: CPT

## 2023-06-04 PROCEDURE — 70450 CT HEAD/BRAIN W/O DYE: CPT

## 2023-06-04 PROCEDURE — 6360000002 HC RX W HCPCS: Performed by: PHYSICIAN ASSISTANT

## 2023-06-04 PROCEDURE — 72192 CT PELVIS W/O DYE: CPT

## 2023-06-04 PROCEDURE — 96372 THER/PROPH/DIAG INJ SC/IM: CPT

## 2023-06-04 PROCEDURE — 96374 THER/PROPH/DIAG INJ IV PUSH: CPT

## 2023-06-04 RX ORDER — FENTANYL CITRATE 50 UG/ML
25 INJECTION, SOLUTION INTRAMUSCULAR; INTRAVENOUS ONCE
Status: COMPLETED | OUTPATIENT
Start: 2023-06-04 | End: 2023-06-04

## 2023-06-04 RX ORDER — LORAZEPAM 2 MG/ML
1 INJECTION INTRAMUSCULAR ONCE
Status: COMPLETED | OUTPATIENT
Start: 2023-06-04 | End: 2023-06-04

## 2023-06-04 RX ORDER — 0.9 % SODIUM CHLORIDE 0.9 %
500 INTRAVENOUS SOLUTION INTRAVENOUS ONCE
Status: DISCONTINUED | OUTPATIENT
Start: 2023-06-04 | End: 2023-06-05 | Stop reason: HOSPADM

## 2023-06-04 RX ORDER — ONDANSETRON 2 MG/ML
4 INJECTION INTRAMUSCULAR; INTRAVENOUS EVERY 6 HOURS PRN
Status: DISCONTINUED | OUTPATIENT
Start: 2023-06-04 | End: 2023-06-05

## 2023-06-04 RX ADMIN — FENTANYL CITRATE 25 MCG: 50 INJECTION, SOLUTION INTRAMUSCULAR; INTRAVENOUS at 19:54

## 2023-06-04 RX ADMIN — ONDANSETRON 4 MG: 2 INJECTION INTRAMUSCULAR; INTRAVENOUS at 19:54

## 2023-06-04 RX ADMIN — LORAZEPAM 1 MG: 2 INJECTION INTRAMUSCULAR; INTRAVENOUS at 22:55

## 2023-06-04 ASSESSMENT — ENCOUNTER SYMPTOMS
SHORTNESS OF BREATH: 0
COUGH: 0
RHINORRHEA: 0
VOMITING: 0
NAUSEA: 0
DIARRHEA: 0
ABDOMINAL PAIN: 0

## 2023-06-04 ASSESSMENT — PAIN SCALES - GENERAL: PAINLEVEL_OUTOF10: 7

## 2023-06-04 ASSESSMENT — LIFESTYLE VARIABLES
HOW MANY STANDARD DRINKS CONTAINING ALCOHOL DO YOU HAVE ON A TYPICAL DAY: PATIENT DOES NOT DRINK
HOW OFTEN DO YOU HAVE A DRINK CONTAINING ALCOHOL: NEVER

## 2023-06-04 ASSESSMENT — PAIN DESCRIPTION - LOCATION: LOCATION: NECK

## 2023-06-05 LAB
ANION GAP SERPL CALCULATED.3IONS-SCNC: 11 MMOL/L (ref 3–16)
BASOPHILS # BLD: 0.1 K/UL (ref 0–0.2)
BASOPHILS NFR BLD: 1 %
BUN SERPL-MCNC: 29 MG/DL (ref 7–20)
CALCIUM SERPL-MCNC: 8.5 MG/DL (ref 8.3–10.6)
CHLORIDE SERPL-SCNC: 104 MMOL/L (ref 99–110)
CO2 SERPL-SCNC: 20 MMOL/L (ref 21–32)
CREAT SERPL-MCNC: 1.2 MG/DL (ref 0.6–1.2)
DEPRECATED RDW RBC AUTO: 14.2 % (ref 12.4–15.4)
EKG ATRIAL RATE: 65 BPM
EKG DIAGNOSIS: NORMAL
EKG P AXIS: 23 DEGREES
EKG P-R INTERVAL: 156 MS
EKG Q-T INTERVAL: 480 MS
EKG QRS DURATION: 130 MS
EKG QTC CALCULATION (BAZETT): 499 MS
EKG R AXIS: -32 DEGREES
EKG T AXIS: 55 DEGREES
EKG VENTRICULAR RATE: 65 BPM
EOSINOPHIL # BLD: 0.4 K/UL (ref 0–0.6)
EOSINOPHIL NFR BLD: 6.2 %
GFR SERPLBLD CREATININE-BSD FMLA CKD-EPI: 46 ML/MIN/{1.73_M2}
GLUCOSE BLD-MCNC: 128 MG/DL (ref 70–99)
GLUCOSE BLD-MCNC: 151 MG/DL (ref 70–99)
GLUCOSE BLD-MCNC: 169 MG/DL (ref 70–99)
GLUCOSE BLD-MCNC: 217 MG/DL (ref 70–99)
GLUCOSE BLD-MCNC: 226 MG/DL (ref 70–99)
GLUCOSE SERPL-MCNC: 161 MG/DL (ref 70–99)
HCT VFR BLD AUTO: 37 % (ref 36–48)
HGB BLD-MCNC: 11.9 G/DL (ref 12–16)
LYMPHOCYTES # BLD: 1 K/UL (ref 1–5.1)
LYMPHOCYTES NFR BLD: 18.2 %
MCH RBC QN AUTO: 31.2 PG (ref 26–34)
MCHC RBC AUTO-ENTMCNC: 32.2 G/DL (ref 31–36)
MCV RBC AUTO: 96.9 FL (ref 80–100)
MONOCYTES # BLD: 0.6 K/UL (ref 0–1.3)
MONOCYTES NFR BLD: 9.8 %
NEUTROPHILS # BLD: 3.7 K/UL (ref 1.7–7.7)
NEUTROPHILS NFR BLD: 64.8 %
PERFORMED ON: ABNORMAL
PLATELET # BLD AUTO: 277 K/UL (ref 135–450)
PMV BLD AUTO: 8.3 FL (ref 5–10.5)
POTASSIUM SERPL-SCNC: 5.9 MMOL/L (ref 3.5–5.1)
RBC # BLD AUTO: 3.82 M/UL (ref 4–5.2)
REASON FOR REJECTION: NORMAL
REJECTED TEST: NORMAL
SODIUM SERPL-SCNC: 135 MMOL/L (ref 136–145)
WBC # BLD AUTO: 5.7 K/UL (ref 4–11)

## 2023-06-05 PROCEDURE — 6370000000 HC RX 637 (ALT 250 FOR IP): Performed by: PHYSICIAN ASSISTANT

## 2023-06-05 PROCEDURE — 2580000003 HC RX 258: Performed by: PHYSICIAN ASSISTANT

## 2023-06-05 PROCEDURE — 36415 COLL VENOUS BLD VENIPUNCTURE: CPT

## 2023-06-05 PROCEDURE — 85025 COMPLETE CBC W/AUTO DIFF WBC: CPT

## 2023-06-05 PROCEDURE — 6370000000 HC RX 637 (ALT 250 FOR IP): Performed by: INTERNAL MEDICINE

## 2023-06-05 PROCEDURE — 93010 ELECTROCARDIOGRAM REPORT: CPT | Performed by: INTERNAL MEDICINE

## 2023-06-05 PROCEDURE — G0378 HOSPITAL OBSERVATION PER HR: HCPCS

## 2023-06-05 PROCEDURE — 80048 BASIC METABOLIC PNL TOTAL CA: CPT

## 2023-06-05 PROCEDURE — 94760 N-INVAS EAR/PLS OXIMETRY 1: CPT

## 2023-06-05 PROCEDURE — 6360000002 HC RX W HCPCS: Performed by: PHYSICIAN ASSISTANT

## 2023-06-05 RX ORDER — ACETAMINOPHEN 325 MG/1
650 TABLET ORAL EVERY 6 HOURS PRN
Status: DISCONTINUED | OUTPATIENT
Start: 2023-06-05 | End: 2023-06-07 | Stop reason: HOSPADM

## 2023-06-05 RX ORDER — ACETAMINOPHEN 650 MG/1
650 SUPPOSITORY RECTAL EVERY 6 HOURS PRN
Status: DISCONTINUED | OUTPATIENT
Start: 2023-06-05 | End: 2023-06-07 | Stop reason: HOSPADM

## 2023-06-05 RX ORDER — INSULIN GLARGINE 100 [IU]/ML
25 INJECTION, SOLUTION SUBCUTANEOUS NIGHTLY
Status: DISCONTINUED | OUTPATIENT
Start: 2023-06-05 | End: 2023-06-07 | Stop reason: HOSPADM

## 2023-06-05 RX ORDER — FLUOXETINE 10 MG/1
10 CAPSULE ORAL DAILY
Status: DISCONTINUED | OUTPATIENT
Start: 2023-06-05 | End: 2023-06-07 | Stop reason: HOSPADM

## 2023-06-05 RX ORDER — INSULIN LISPRO 100 [IU]/ML
0-4 INJECTION, SOLUTION INTRAVENOUS; SUBCUTANEOUS NIGHTLY
Status: DISCONTINUED | OUTPATIENT
Start: 2023-06-05 | End: 2023-06-07 | Stop reason: HOSPADM

## 2023-06-05 RX ORDER — SODIUM CHLORIDE 0.9 % (FLUSH) 0.9 %
5-40 SYRINGE (ML) INJECTION EVERY 12 HOURS SCHEDULED
Status: DISCONTINUED | OUTPATIENT
Start: 2023-06-05 | End: 2023-06-07 | Stop reason: HOSPADM

## 2023-06-05 RX ORDER — SENNA PLUS 8.6 MG/1
1 TABLET ORAL DAILY PRN
Status: DISCONTINUED | OUTPATIENT
Start: 2023-06-05 | End: 2023-06-07 | Stop reason: HOSPADM

## 2023-06-05 RX ORDER — AMLODIPINE BESYLATE 5 MG/1
5 TABLET ORAL DAILY
Status: DISCONTINUED | OUTPATIENT
Start: 2023-06-05 | End: 2023-06-07 | Stop reason: HOSPADM

## 2023-06-05 RX ORDER — DIVALPROEX SODIUM 125 MG/1
125 CAPSULE, COATED PELLETS ORAL EVERY 8 HOURS SCHEDULED
Status: DISCONTINUED | OUTPATIENT
Start: 2023-06-05 | End: 2023-06-07 | Stop reason: HOSPADM

## 2023-06-05 RX ORDER — INSULIN LISPRO 100 [IU]/ML
0-4 INJECTION, SOLUTION INTRAVENOUS; SUBCUTANEOUS
Status: DISCONTINUED | OUTPATIENT
Start: 2023-06-05 | End: 2023-06-07 | Stop reason: HOSPADM

## 2023-06-05 RX ORDER — RISPERIDONE 0.5 MG/1
0.5 TABLET ORAL 2 TIMES DAILY
Status: DISCONTINUED | OUTPATIENT
Start: 2023-06-05 | End: 2023-06-07 | Stop reason: HOSPADM

## 2023-06-05 RX ORDER — ONDANSETRON 2 MG/ML
4 INJECTION INTRAMUSCULAR; INTRAVENOUS EVERY 6 HOURS PRN
Status: DISCONTINUED | OUTPATIENT
Start: 2023-06-05 | End: 2023-06-07 | Stop reason: HOSPADM

## 2023-06-05 RX ORDER — SODIUM CHLORIDE 0.9 % (FLUSH) 0.9 %
5-40 SYRINGE (ML) INJECTION PRN
Status: DISCONTINUED | OUTPATIENT
Start: 2023-06-05 | End: 2023-06-07 | Stop reason: HOSPADM

## 2023-06-05 RX ORDER — SODIUM CHLORIDE 9 MG/ML
INJECTION, SOLUTION INTRAVENOUS PRN
Status: DISCONTINUED | OUTPATIENT
Start: 2023-06-05 | End: 2023-06-07 | Stop reason: HOSPADM

## 2023-06-05 RX ORDER — SODIUM CHLORIDE 9 MG/ML
INJECTION, SOLUTION INTRAVENOUS CONTINUOUS
Status: DISCONTINUED | OUTPATIENT
Start: 2023-06-05 | End: 2023-06-07 | Stop reason: HOSPADM

## 2023-06-05 RX ORDER — ENOXAPARIN SODIUM 100 MG/ML
40 INJECTION SUBCUTANEOUS DAILY
Status: DISCONTINUED | OUTPATIENT
Start: 2023-06-05 | End: 2023-06-07 | Stop reason: HOSPADM

## 2023-06-05 RX ORDER — INSULIN LISPRO 100 [IU]/ML
5 INJECTION, SOLUTION INTRAVENOUS; SUBCUTANEOUS
Status: DISCONTINUED | OUTPATIENT
Start: 2023-06-05 | End: 2023-06-07 | Stop reason: HOSPADM

## 2023-06-05 RX ORDER — DONEPEZIL HYDROCHLORIDE 5 MG/1
5 TABLET, FILM COATED ORAL NIGHTLY
Status: DISCONTINUED | OUTPATIENT
Start: 2023-06-05 | End: 2023-06-07 | Stop reason: HOSPADM

## 2023-06-05 RX ORDER — DEXTROSE MONOHYDRATE 100 MG/ML
INJECTION, SOLUTION INTRAVENOUS CONTINUOUS PRN
Status: DISCONTINUED | OUTPATIENT
Start: 2023-06-05 | End: 2023-06-07 | Stop reason: HOSPADM

## 2023-06-05 RX ADMIN — DIVALPROEX SODIUM 125 MG: 125 CAPSULE ORAL at 14:42

## 2023-06-05 RX ADMIN — ENOXAPARIN SODIUM 40 MG: 100 INJECTION SUBCUTANEOUS at 09:20

## 2023-06-05 RX ADMIN — INSULIN LISPRO 5 UNITS: 100 INJECTION, SOLUTION INTRAVENOUS; SUBCUTANEOUS at 15:00

## 2023-06-05 RX ADMIN — INSULIN LISPRO 5 UNITS: 100 INJECTION, SOLUTION INTRAVENOUS; SUBCUTANEOUS at 18:30

## 2023-06-05 RX ADMIN — RISPERIDONE 0.5 MG: 0.5 TABLET ORAL at 11:43

## 2023-06-05 RX ADMIN — DIVALPROEX SODIUM 125 MG: 125 CAPSULE ORAL at 09:20

## 2023-06-05 RX ADMIN — SODIUM ZIRCONIUM CYCLOSILICATE 10 G: 10 POWDER, FOR SUSPENSION ORAL at 11:40

## 2023-06-05 RX ADMIN — INSULIN GLARGINE 25 UNITS: 100 INJECTION, SOLUTION SUBCUTANEOUS at 22:21

## 2023-06-05 RX ADMIN — INSULIN LISPRO 1 UNITS: 100 INJECTION, SOLUTION INTRAVENOUS; SUBCUTANEOUS at 18:30

## 2023-06-05 RX ADMIN — FLUOXETINE 10 MG: 10 CAPSULE ORAL at 09:20

## 2023-06-05 RX ADMIN — SODIUM CHLORIDE: 9 INJECTION, SOLUTION INTRAVENOUS at 00:40

## 2023-06-05 NOTE — ED PROVIDER NOTES
scale insulin for SQ injections    LISINOPRIL-HYDROCHLOROTHIAZIDE (PRINZIDE;ZESTORETIC) 20-12.5 MG PER TABLET    Take 2 tablets by mouth daily    RISPERIDONE (RISPERDAL) 0.5 MG TABLET    Take 1 tablet by mouth 2 times daily       ALLERGIES     Amoxicillin-pot clavulanate, Nitrofuran derivatives, and Bactrim    FAMILYHISTORY       Family History   Problem Relation Age of Onset    Heart Disease Mother     Cancer Father     Diabetes Other     High Cholesterol Neg Hx     High Blood Pressure Neg Hx         SOCIAL HISTORY       Social History     Tobacco Use    Smoking status: Former     Packs/day: 0.00     Years: 0.00     Pack years: 0.00     Types: Cigarettes     Quit date: 1984     Years since quittin.1    Smokeless tobacco: Never    Tobacco comments:     Stoppedsmoing in    Vaping Use    Vaping Use: Never used   Substance Use Topics    Alcohol use: No     Alcohol/week: 0.0 standard drinks    Drug use: No       SCREENINGS        Crane Coma Scale  Eye Opening: Spontaneous  Best Verbal Response: Confused  Best Motor Response: Obeys commands  Vanessa Coma Scale Score: 14                CIWA Assessment  BP: (!) 126/92  Pulse: 76           PHYSICAL EXAM  1 or more Elements     ED Triage Vitals [23 1901]   BP Temp Temp Source Pulse Respirations SpO2 Height Weight   (!) 126/92 98.6 °F (37 °C) Oral 76 18 100 % -- --       Physical Exam  Vitals and nursing note reviewed. Constitutional:       Appearance: She is well-developed. She is not diaphoretic. HENT:      Head: Normocephalic and atraumatic. Comments: There is no fuentes sign or raccoon sign or CSF rhinorrhea     Right Ear: External ear normal.      Left Ear: External ear normal.      Nose: Nose normal.      Mouth/Throat:      Mouth: Mucous membranes are moist.      Pharynx: Oropharynx is clear. No posterior oropharyngeal erythema. Eyes:      General:         Right eye: No discharge. Left eye: No discharge.       Extraocular

## 2023-06-05 NOTE — ED NOTES
Attempted to stand and ambulate patient. She became angry, yelled at nurse and told her to back up. Attempted to place an IV in patient for fluid administration, pt non compliant at this time, balled up fist at nurse.       Dione Garcia RN  06/04/23 8333

## 2023-06-05 NOTE — H&P
[facility] History & Physical      Patient Name: Fan Singh    : 1945    PCP: Aleida Bland, APRN - CNP    Date of Service:  Patient seen and examined on 2023     Chief Complaint:  Fall    History Of Present Illness:    Fan Singh is a [de-identified] y.o. female who presented to ED from NH for evaluation after a fall. Patient has a history of dementia. Upon my initial assessment, patient was initially unwilling to provide any history. Patient had pulled out PIV and nurse had attempted to place new one but patient became aggressive. Ativan 1 mg IM had been ordered but not yet administered. Upon entering room, patient was agitated and uncooperative, unwilling to answer any questions. Nurse administered ativan in order to safely replace PIV. Upon reassessment, patient is  somnolent but arousable to pain, unable to provide any history. History derived from conversation with ER PA and review of records. Patient has a history of vertigo leading to frequent falls. Patient reports she fell last night due to vertigo. When EMS arrived with the patient they reported they were unsure how long the patient was lying on the ground. Patient denied dizziness or lightheadedness upon arrival.  She denied headache. She complained of neck pain and right hip pain. She denied fever, chest pain, shortness of breath, cough, abdominal pain, vomiting, diarrhea, urinary symptoms. She denied any recent illnesses. She was alert and oriented x2 which is her baseline.         Past Medical History:    Patient has a past medical history of Alzheimer's disease, unspecified ([address] Arthritis, Aural vertigo, unspecified ear, Cancer ([address] Cataract, Crohn disease ([address] Dementia ([address] Dental bridge present, Dental crowns present, Diabetes mellitus ([address] Ductal carcinoma in situ of left breast, Encounter for loop recorder at end of battery life, Goiter, Hearing impairment, Herniated lumbar intervertebral

## 2023-06-05 NOTE — PLAN OF CARE
Problem: Neurosensory - Adult  Goal: Absence of seizures  Outcome: Progressing  Goal: Achieves maximal functionality and self care  Outcome: Progressing     Problem: Respiratory - Adult  Goal: Achieves optimal ventilation and oxygenation  Outcome: Progressing     Problem: Skin/Tissue Integrity - Adult  Goal: Skin integrity remains intact  Outcome: Progressing  Flowsheets (Taken 6/5/2023 0015)  Skin Integrity Remains Intact: Monitor for areas of redness and/or skin breakdown  Goal: Oral mucous membranes remain intact  Outcome: Progressing

## 2023-06-05 NOTE — ED NOTES
Attempted to ambulate patient. She sat on the side of the bed and was unable to stand up when attempting to ambulate. She states she is not dizzy but is too weak.      Dione Garcia RN  06/04/23 2100

## 2023-06-05 NOTE — ED NOTES
Pt pulled out IV access to R A/C, attempting to get out of bed but unsteady at this time. She also removed all EKG leads and BP cuff. Multiple attempts to scratch at nurse.      Jhon Duenas RN  06/04/23 6103

## 2023-06-05 NOTE — ED NOTES
Attempted to place an IV for patient again, no success. Pt told nurse to get out of her room. Report called to 5T RN. Pt will be going upstairs by stretcher at this time.      Jaylan Alcantara RN  06/05/23 0003

## 2023-06-06 VITALS
RESPIRATION RATE: 18 BRPM | TEMPERATURE: 98 F | OXYGEN SATURATION: 98 % | WEIGHT: 180.78 LBS | HEIGHT: 64 IN | SYSTOLIC BLOOD PRESSURE: 137 MMHG | DIASTOLIC BLOOD PRESSURE: 74 MMHG | HEART RATE: 78 BPM | BODY MASS INDEX: 30.86 KG/M2

## 2023-06-06 LAB
ANION GAP SERPL CALCULATED.3IONS-SCNC: 10 MMOL/L (ref 3–16)
BUN SERPL-MCNC: 26 MG/DL (ref 7–20)
CALCIUM SERPL-MCNC: 8.9 MG/DL (ref 8.3–10.6)
CHLORIDE SERPL-SCNC: 106 MMOL/L (ref 99–110)
CO2 SERPL-SCNC: 24 MMOL/L (ref 21–32)
CREAT SERPL-MCNC: 1.1 MG/DL (ref 0.6–1.2)
GFR SERPLBLD CREATININE-BSD FMLA CKD-EPI: 51 ML/MIN/{1.73_M2}
GLUCOSE BLD-MCNC: 130 MG/DL (ref 70–99)
GLUCOSE BLD-MCNC: 139 MG/DL (ref 70–99)
GLUCOSE BLD-MCNC: 143 MG/DL (ref 70–99)
GLUCOSE BLD-MCNC: 262 MG/DL (ref 70–99)
GLUCOSE SERPL-MCNC: 138 MG/DL (ref 70–99)
PERFORMED ON: ABNORMAL
POTASSIUM SERPL-SCNC: 4 MMOL/L (ref 3.5–5.1)
SODIUM SERPL-SCNC: 140 MMOL/L (ref 136–145)

## 2023-06-06 PROCEDURE — G0378 HOSPITAL OBSERVATION PER HR: HCPCS

## 2023-06-06 PROCEDURE — 6370000000 HC RX 637 (ALT 250 FOR IP): Performed by: INTERNAL MEDICINE

## 2023-06-06 PROCEDURE — 36415 COLL VENOUS BLD VENIPUNCTURE: CPT

## 2023-06-06 PROCEDURE — 6360000002 HC RX W HCPCS: Performed by: PHYSICIAN ASSISTANT

## 2023-06-06 PROCEDURE — 6370000000 HC RX 637 (ALT 250 FOR IP): Performed by: PHYSICIAN ASSISTANT

## 2023-06-06 PROCEDURE — 97530 THERAPEUTIC ACTIVITIES: CPT

## 2023-06-06 PROCEDURE — 97166 OT EVAL MOD COMPLEX 45 MIN: CPT

## 2023-06-06 PROCEDURE — 80048 BASIC METABOLIC PNL TOTAL CA: CPT

## 2023-06-06 PROCEDURE — 97162 PT EVAL MOD COMPLEX 30 MIN: CPT

## 2023-06-06 RX ORDER — AMLODIPINE BESYLATE 5 MG/1
10 TABLET ORAL DAILY
Qty: 180 TABLET | Refills: 3
Start: 2023-06-06 | End: 2024-06-05

## 2023-06-06 RX ADMIN — INSULIN LISPRO 5 UNITS: 100 INJECTION, SOLUTION INTRAVENOUS; SUBCUTANEOUS at 13:16

## 2023-06-06 RX ADMIN — DIVALPROEX SODIUM 125 MG: 125 CAPSULE ORAL at 13:14

## 2023-06-06 RX ADMIN — ENOXAPARIN SODIUM 40 MG: 100 INJECTION SUBCUTANEOUS at 13:01

## 2023-06-06 RX ADMIN — RISPERIDONE 0.5 MG: 0.5 TABLET ORAL at 13:02

## 2023-06-06 RX ADMIN — FLUOXETINE 10 MG: 10 CAPSULE ORAL at 13:14

## 2023-06-06 RX ADMIN — AMLODIPINE BESYLATE 5 MG: 5 TABLET ORAL at 13:02

## 2023-06-06 ASSESSMENT — PAIN SCALES - WONG BAKER
WONGBAKER_NUMERICALRESPONSE: 0
WONGBAKER_NUMERICALRESPONSE: 0

## 2023-06-06 NOTE — DISCHARGE INSTRUCTIONS
Follow up with your PCP within 7-10 days of discharge. Have PCP check blood pressure and adjust medication dose if needed  Take all your medications as prescribed.

## 2023-06-06 NOTE — PROGRESS NOTES
4 Eyes Skin Assessment     NAME:  Prachi Guerrero  YOB: 1945  MEDICAL RECORD NUMBER:  [id number]    The patient is being assessed for  Admission    I agree that at least one RN has performed a thorough Head to Toe Skin Assessment on the patient. ALL assessment sites listed below have been assessed. Areas assessed by both nurses:    Head, Face, Ears, Shoulders, Back, Chest, Arms, Elbows, Hands, Sacrum. Buttock, Coccyx, Ischium, Legs. Feet and Heels, and Under Medical Devices         Does the Patient have a Wound?  No noted wound(s)       Pedro Prevention initiated by RN: Yes  Wound Care Orders initiated by RN: No    Pressure Injury (Stage 3,4, Unstageable, DTI, NWPT, and Complex wounds) if present, place Wound referral order by RN under : No    New Ostomies, if present place, Ostomy referral order under : No     Nurse 1 eSignature: Electronically signed by Jessie Hanks RN on 6/5/23 at 6:47 AM EDT    **SHARE this note so that the co-signing nurse can place an eSignature**    Nurse 2 eSignature: Electronically signed by Lynette Pham RN on 6/5/23 at 6:48 AM EDT
Attempt made to clean patient from incontinence, patient refused and pulled covers back over herself and said \"Not until I'm awake\". Patient also refusing 0600 Depakote for same stated reason.
Ciera Yao NP aware of patient pulling out PIV. Was informed of patient's on going irritable state and picking at everything. Advised to not restart IV and to hold the IVF.
Pt is a new admission, when coming up to the floor, pt has been asleep she was given fentanyl and ativan in the ed, pt didn't stay awoke long enough to answer questions or take medication, can be combative at times, vss are stable, Call paced to [facility] so admission can be completed, no answer will pass along to days regarding filling in information for admision
Shift assessment completed. Routine vitals obtained. Scheduled medications given. Patient is awake, alert and oriented. Respirations are easy and unlabored. Patient does not appear to be in distress, resting comfortably at this time. Call light within reach. Falls precautions in place.
V2.0    [facility] Progress Note      Name:  Bette  /Age/Sex: 1945  ([de-identified] y.o. female)   MRN & CSN:  [id number] & [id number] Encounter Date/Time: 2023 11:44 AM EDT   Location:  /5356-81 PCP: Tomas Orantes, LUCIANA - MARILEE Turner MD       Hospital Day: 2    Assessment and Recommendations   69-year-old  female, F resident with history of Alzheimer's dementia, vertigo, T2DM, hypertension, hyperlipidemia, LBBB, chronic back pain with spinal stenosis was sent in from [facility] for unwitnessed fall    Unwitnessed fall  Neck pain hip pain secondary to above but no evidence of fracture on imaging  Clinical dehydration with mild elevation in serum creatinine to 1.3 from baseline of 1  T2 DM  Hypertension  Alzheimer's dementia with behavioral disturbance  Hyperlipidemia  Hyperkalemia    Plan:   Continue IV fluid and monitor renal function trended down mildly to 1.2  Lokelma 10 g p.o. once  Continue other home medication for her comorbidities  PT/OT eval  Fall precaution  Supportive care  Likely discharge back to [facility] tomorrow morning              Comment: Please note this report has been produced using speech recognition software and may contain errors related to that system including errors in grammar, punctuation, and spelling, as well as words and phrases that may be inappropriate. If there are any questions or concerns please feel free to contact the dictating provider for clarification. Diet ADULT DIET;  Regular; 4 carb choices (60 gm/meal)   DVT Prophylaxis [] Lovenox, []  Heparin, [x] SCDs, [] Ambulation,  [] Eliquis, [] Xarelto   Code Status Full Code   Disposition From [facility]  Expected Disposition: ECF  Estimated Date of Discharge: 2023     Surrogate Decision Maker/ POA       Personally reviewed Lab Studies and Imaging     Discussed management of the case with     Imaging that was interpreted personally     Drugs that require monitoring for toxicity
[facility] [address] Department   Phone: [phone]    Physical Therapy    [x] Initial Evaluation            [] Daily Treatment Note         [] Discharge Summary      Patient: Yuni Watkins   : 1945   MRN: [id number]   Date of Service:  2023  Admitting Diagnosis: Dehydration  Current Admission Summary:  73-year-old female, Counts include 234 beds at the [facility] resident with history of Alzheimer's dementia, vertigo, T2DM, hypertension, hyperlipidemia, LBBB, chronic back pain with spinal stenosis was sent in from [facility] for unwitnessed fall  Past Medical History:  has a past medical history of Alzheimer's disease, unspecified ([address] Arthritis, Aural vertigo, unspecified ear, Cancer ([address] Cataract, Crohn disease ([address] Dementia ([address] Dental bridge present, Dental crowns present, Diabetes mellitus ([address] Ductal carcinoma in situ of left breast, Encounter for loop recorder at end of battery life, Goiter, Hearing impairment, Herniated lumbar intervertebral disc, History of pancreatitis, Hypercholesteremia, Hypertension, IBS (irritable bowel syndrome), Insomnia, unspecified, Intraductal carcinoma in situ of left breast, LBBB (left bundle branch block), Need for assistance with personal care, Noninfective gastroenteritis and colitis, unspecified, Osteoarthritis of foot, Other specified postprocedural states, Personal history of malignant neoplasm of breast, Primary osteoarthritis, Radiculopathy, lumbar region, Recurrent UTI, Right-sided low back pain with right-sided sciatica, Rosacea, Spinal stenosis, Spinal stenosis, lumbar region without neurogenic claudication, Tibialis posterior tendinitis, Type II or unspecified type diabetes mellitus without mention of complication, not stated as uncontrolled, and Unspecified hearing loss, unspecified ear. Past Surgical History:  has a past surgical history that includes Carpal tunnel release; Hysterectomy; Insertable Cardiac Monitor; eye surgery (Bilateral);  Breast
Breast lumpectomy (11 6 12); Breast biopsy (10/5/12); Colonoscopy; Cardiac surgery (2010); Axillary Surgery (Left); and Lumbar spine surgery (04/14/2016). Discharge Recommendations: William Yap scored a 12/24 on the AM-PAC ADL Inpatient form. Current research shows that an AM-PAC score of 17 or less is typically not associated with a discharge to the patient's home setting. Based on the patient's AM-PAC score and their current ADL deficits, it is recommended that the patient have 3-5 sessions per week of Occupational Therapy at d/c to increase the patient's independence. Please see assessment section for further patient specific details. If patient discharges prior to next session this note will serve as a discharge summary. Please see below for the latest assessment towards goals.        DME Required For Discharge: DME to be determined at next level of care, DME to be determined pending patient progress    Precautions/Restrictions: high fall risk, contact precautions  Weight Bearing Restrictions: no restrictions  [] Right Upper Extremity  [] Left Upper Extremity [] Right Lower Extremity  [] Left Lower Extremity     Required Braces/Orthotics: no braces required   [] Right  [] Left  Positional Restrictions:no positional restrictions    Pre-Admission Information   Lives With: alone    Type of Home:  [address] [place] [address] -- has been there 4 weeks, was originally in [place] at [facility]: one level  Home Equipment: rollator - 4 wheeled walker  Transfer Assistance: Independent without use of device  Ambulation Assistance:modified independent with use of 4WW  ADL Assistance: requires assistance with bathing, requires assistance with dressing - setup/supervision  IADL Assistance: requires assistance with all homemaking tasks  Active :        [] Yes  [x] No  Recent Falls: 2 falls in last 4 weeks with 1 leading to this admission    Family member present at end of session to provide PLOF

## 2023-06-06 NOTE — CARE COORDINATION
06/06/23 1729   IMM Letter   IMM Letter given to Patient/Family/Significant other/Guardian/POA/by: Case Management   IMM Letter date given: 06/06/23   IMM Letter time given: 1635     Electronically signed by MAGNOLIA Cruz, CATHRYNW on 6/6/2023 at 5:30 PM

## 2023-06-06 NOTE — CARE COORDINATION
Sw informed that patient was ready for discharge. Confirmed w/Nolan at [facility] that patient is in their Memory Care unit on the Assisted Living side. Patient was recommended for SNF and would need a precert if famaily agreeable to SNF. Spoke with son, Rakan Vera, who reported he would rather pt discharge back to the Assisted Living facility. He believes patient has not deconditioned. Stated \"the facility doesn't even know if she fell. \"  Reported he felt pt would be more comfortable and do better, with her dementia, back at the assisted living. Set transport with [address] [place] [address] at 9pm [facility]. RN, son and facility informed. Discharge packet completed. Confirmed w/Nolan that no need to fax. Discharge Plan:  [address] [facility])  [facility], [address] [place] [address] Drive  Phone: [phone]  Fax: [address] transport at [facility].     Electronically signed by MAGNOLIA Franco, CATHRYNW on 6/6/2023 at 5:39 PM

## 2023-06-07 RX ORDER — FLASH GLUCOSE SENSOR
KIT MISCELLANEOUS
Qty: 12 EACH | Refills: 3 | Status: SHIPPED | OUTPATIENT
Start: 2023-06-07

## 2023-06-08 NOTE — DISCHARGE SUMMARY
Hospital Medicine Discharge Summary    Patient ID: Shaheen Jenkins      Patient's PCP: Cynthia Borden, APRN - CNP    Admit Date: 6/4/2023     Discharge Date: 6/6/2023     Admitting Physician: Nimo Galarza DO     Discharge Physician: Joana Mathew MD       Hospital Course: This 23-year-old  female, F resident with history of Alzheimer's dementia, vertigo, T2DM, hypertension, hyperlipidemia, LBBB, chronic back pain with spinal stenosis was sent in from [facility] for unwitnessed fall      Unwitnessed fall  Right hip pain secondary to above but no evidence of fracture on imaging  Diabetes mellitus. Continue current regimen  Hypertension: Continue current regimen  Alzheimer's dementia with behavioral disturbance  Hyperlipidemia; on statins statins  Hyperkalemia; resolved with Lokelma       Physical Exam Performed:     /74   Pulse 78   Temp 98 °F (36.7 °C) (Oral)   Resp 18   Ht 5' 4\" (1.626 m)   Wt 180 lb 12.4 oz (82 kg)   SpO2 98%   BMI 31.03 kg/m²     General appearance: No apparent distress, appears stated age and cooperative. Eyes: Sclera clear. Pupils equal.  ENT: Moist oral mucosa. Trachea midline, no adenopathy. Cardiovascular: Regular rhythm, normal S1, S2. No murmur. Respiratory: Clear to auscultation bilaterally, no wheeze or crackles. GI: Abdomen soft, no tenderness, not distended, normal bowel sounds  Musculoskeletal:  No cyanosis in digits. No BLE edema present  Neurology: CN 2-12 grossly intact. No gross speech or motor deficits  Psych: Not agitated, appropriate affect. Skin: Warm, dry, normal turgor    Consults:     None    Disposition: SNF    Condition at Discharge: Stable     Discharge Instructions/Follow-up:   Follow up with your PCP within 7-10 days of discharge. Have PCP check blood pressure and adjust medication dose if needed  Take all your medications as prescribed.       Discharge Medications:     Discharge Medication List as of 6/6/2023  9:19 PM

## 2023-06-12 ENCOUNTER — APPOINTMENT (OUTPATIENT)
Dept: GENERAL RADIOLOGY | Age: 78
DRG: 689 | End: 2023-06-12
Payer: MEDICARE

## 2023-06-12 ENCOUNTER — APPOINTMENT (OUTPATIENT)
Dept: CT IMAGING | Age: 78
DRG: 689 | End: 2023-06-12
Payer: MEDICARE

## 2023-06-12 ENCOUNTER — HOSPITAL ENCOUNTER (INPATIENT)
Age: 78
LOS: 2 days | Discharge: HOME | DRG: 689 | End: 2023-06-14
Attending: EMERGENCY MEDICINE | Admitting: INTERNAL MEDICINE
Payer: MEDICARE

## 2023-06-12 DIAGNOSIS — G93.40 ACUTE ENCEPHALOPATHY: Primary | ICD-10-CM

## 2023-06-12 DIAGNOSIS — N30.00 ACUTE CYSTITIS WITHOUT HEMATURIA: ICD-10-CM

## 2023-06-12 PROBLEM — R41.82 ALTERED MENTAL STATUS: Status: ACTIVE | Noted: 2023-06-12

## 2023-06-12 PROBLEM — F03.90 DEMENTIA (HCC): Status: ACTIVE | Noted: 2022-09-07

## 2023-06-12 LAB
ALBUMIN SERPL-MCNC: 3.1 G/DL (ref 3.4–5)
ALBUMIN/GLOB SERPL: 1.3 {RATIO} (ref 1.1–2.2)
ALP SERPL-CCNC: 87 U/L (ref 40–129)
ALT SERPL-CCNC: 8 U/L (ref 10–40)
ANION GAP SERPL CALCULATED.3IONS-SCNC: 14 MMOL/L (ref 3–16)
AST SERPL-CCNC: 15 U/L (ref 15–37)
BACTERIA URNS QL MICRO: ABNORMAL /HPF
BASOPHILS # BLD: 0 K/UL (ref 0–0.2)
BASOPHILS NFR BLD: 0.4 %
BILIRUB SERPL-MCNC: 0.3 MG/DL (ref 0–1)
BILIRUB UR QL STRIP.AUTO: NEGATIVE
BUN SERPL-MCNC: 23 MG/DL (ref 7–20)
CALCIUM SERPL-MCNC: 8.2 MG/DL (ref 8.3–10.6)
CHLORIDE SERPL-SCNC: 103 MMOL/L (ref 99–110)
CLARITY UR: ABNORMAL
CO2 SERPL-SCNC: 14 MMOL/L (ref 21–32)
COLOR UR: YELLOW
CREAT SERPL-MCNC: 1.2 MG/DL (ref 0.6–1.2)
DEPRECATED RDW RBC AUTO: 15.4 % (ref 12.4–15.4)
EOSINOPHIL # BLD: 0.1 K/UL (ref 0–0.6)
EOSINOPHIL NFR BLD: 2.5 %
EPI CELLS #/AREA URNS AUTO: 2 /HPF (ref 0–5)
GFR SERPLBLD CREATININE-BSD FMLA CKD-EPI: 46 ML/MIN/{1.73_M2}
GLUCOSE BLD-MCNC: 168 MG/DL (ref 70–99)
GLUCOSE SERPL-MCNC: 340 MG/DL (ref 70–99)
GLUCOSE UR STRIP.AUTO-MCNC: >=1000 MG/DL
HCT VFR BLD AUTO: 37.2 % (ref 36–48)
HGB BLD-MCNC: 11.4 G/DL (ref 12–16)
HGB UR QL STRIP.AUTO: NEGATIVE
HYALINE CASTS #/AREA URNS AUTO: 21 /LPF (ref 0–8)
KETONES UR STRIP.AUTO-MCNC: NEGATIVE MG/DL
LEUKOCYTE ESTERASE UR QL STRIP.AUTO: ABNORMAL
LYMPHOCYTES # BLD: 0.5 K/UL (ref 1–5.1)
LYMPHOCYTES NFR BLD: 9 %
MAGNESIUM SERPL-MCNC: 1.9 MG/DL (ref 1.8–2.4)
MCH RBC QN AUTO: 32.1 PG (ref 26–34)
MCHC RBC AUTO-ENTMCNC: 30.6 G/DL (ref 31–36)
MCV RBC AUTO: 104.8 FL (ref 80–100)
MONOCYTES # BLD: 0.3 K/UL (ref 0–1.3)
MONOCYTES NFR BLD: 6.2 %
NEUTROPHILS # BLD: 4.5 K/UL (ref 1.7–7.7)
NEUTROPHILS NFR BLD: 81.9 %
NITRITE UR QL STRIP.AUTO: NEGATIVE
PERFORMED ON: ABNORMAL
PH UR STRIP.AUTO: 6 [PH] (ref 5–8)
PLATELET # BLD AUTO: 174 K/UL (ref 135–450)
PMV BLD AUTO: 8.5 FL (ref 5–10.5)
POTASSIUM SERPL-SCNC: 3.8 MMOL/L (ref 3.5–5.1)
PROT SERPL-MCNC: 5.4 G/DL (ref 6.4–8.2)
PROT UR STRIP.AUTO-MCNC: 30 MG/DL
RBC # BLD AUTO: 3.55 M/UL (ref 4–5.2)
RBC CLUMPS #/AREA URNS AUTO: 3 /HPF (ref 0–4)
SODIUM SERPL-SCNC: 131 MMOL/L (ref 136–145)
SP GR UR STRIP.AUTO: 1.02 (ref 1–1.03)
TROPONIN, HIGH SENSITIVITY: 31 NG/L (ref 0–14)
TROPONIN, HIGH SENSITIVITY: 31 NG/L (ref 0–14)
UA COMPLETE W REFLEX CULTURE PNL UR: YES
UA DIPSTICK W REFLEX MICRO PNL UR: YES
URN SPEC COLLECT METH UR: ABNORMAL
UROBILINOGEN UR STRIP-ACNC: 1 E.U./DL
WBC # BLD AUTO: 5.4 K/UL (ref 4–11)
WBC #/AREA URNS AUTO: 11 /HPF (ref 0–5)

## 2023-06-12 PROCEDURE — 80053 COMPREHEN METABOLIC PANEL: CPT

## 2023-06-12 PROCEDURE — 36415 COLL VENOUS BLD VENIPUNCTURE: CPT

## 2023-06-12 PROCEDURE — 6360000002 HC RX W HCPCS: Performed by: PHYSICIAN ASSISTANT

## 2023-06-12 PROCEDURE — 85025 COMPLETE CBC W/AUTO DIFF WBC: CPT

## 2023-06-12 PROCEDURE — 2580000003 HC RX 258: Performed by: PHYSICIAN ASSISTANT

## 2023-06-12 PROCEDURE — 2060000000 HC ICU INTERMEDIATE R&B

## 2023-06-12 PROCEDURE — 6370000000 HC RX 637 (ALT 250 FOR IP): Performed by: INTERNAL MEDICINE

## 2023-06-12 PROCEDURE — 99285 EMERGENCY DEPT VISIT HI MDM: CPT

## 2023-06-12 PROCEDURE — 83735 ASSAY OF MAGNESIUM: CPT

## 2023-06-12 PROCEDURE — 1200000000 HC SEMI PRIVATE

## 2023-06-12 PROCEDURE — 81001 URINALYSIS AUTO W/SCOPE: CPT

## 2023-06-12 PROCEDURE — 70450 CT HEAD/BRAIN W/O DYE: CPT

## 2023-06-12 PROCEDURE — 71045 X-RAY EXAM CHEST 1 VIEW: CPT

## 2023-06-12 PROCEDURE — 87086 URINE CULTURE/COLONY COUNT: CPT

## 2023-06-12 PROCEDURE — 93005 ELECTROCARDIOGRAM TRACING: CPT | Performed by: EMERGENCY MEDICINE

## 2023-06-12 PROCEDURE — 84484 ASSAY OF TROPONIN QUANT: CPT

## 2023-06-12 RX ORDER — INSULIN GLARGINE 100 [IU]/ML
25 INJECTION, SOLUTION SUBCUTANEOUS NIGHTLY
Status: DISCONTINUED | OUTPATIENT
Start: 2023-06-12 | End: 2023-06-14 | Stop reason: HOSPADM

## 2023-06-12 RX ORDER — INSULIN LISPRO 100 [IU]/ML
0-8 INJECTION, SOLUTION INTRAVENOUS; SUBCUTANEOUS
Status: DISCONTINUED | OUTPATIENT
Start: 2023-06-13 | End: 2023-06-14 | Stop reason: HOSPADM

## 2023-06-12 RX ORDER — SODIUM CHLORIDE 9 MG/ML
INJECTION, SOLUTION INTRAVENOUS CONTINUOUS
Status: DISCONTINUED | OUTPATIENT
Start: 2023-06-12 | End: 2023-06-14

## 2023-06-12 RX ORDER — FLUOXETINE 10 MG/1
10 CAPSULE ORAL DAILY
Status: DISCONTINUED | OUTPATIENT
Start: 2023-06-13 | End: 2023-06-14 | Stop reason: HOSPADM

## 2023-06-12 RX ORDER — ACETAMINOPHEN 500 MG
500 TABLET ORAL EVERY 6 HOURS PRN
Status: DISCONTINUED | OUTPATIENT
Start: 2023-06-12 | End: 2023-06-14 | Stop reason: HOSPADM

## 2023-06-12 RX ORDER — 0.9 % SODIUM CHLORIDE 0.9 %
500 INTRAVENOUS SOLUTION INTRAVENOUS ONCE
Status: COMPLETED | OUTPATIENT
Start: 2023-06-12 | End: 2023-06-12

## 2023-06-12 RX ORDER — DONEPEZIL HYDROCHLORIDE 5 MG/1
5 TABLET, FILM COATED ORAL NIGHTLY
Status: DISCONTINUED | OUTPATIENT
Start: 2023-06-12 | End: 2023-06-14 | Stop reason: HOSPADM

## 2023-06-12 RX ORDER — INSULIN LISPRO 100 [IU]/ML
5 INJECTION, SOLUTION INTRAVENOUS; SUBCUTANEOUS
Status: DISCONTINUED | OUTPATIENT
Start: 2023-06-13 | End: 2023-06-14 | Stop reason: HOSPADM

## 2023-06-12 RX ORDER — ENOXAPARIN SODIUM 100 MG/ML
40 INJECTION SUBCUTANEOUS DAILY
Status: DISCONTINUED | OUTPATIENT
Start: 2023-06-12 | End: 2023-06-14 | Stop reason: HOSPADM

## 2023-06-12 RX ORDER — NICOTINE POLACRILEX 4 MG
15 LOZENGE BUCCAL SEE ADMIN INSTRUCTIONS
Status: DISCONTINUED | OUTPATIENT
Start: 2023-06-12 | End: 2023-06-12

## 2023-06-12 RX ORDER — TAMOXIFEN CITRATE 10 MG/1
20 TABLET ORAL DAILY
COMMUNITY

## 2023-06-12 RX ORDER — AMLODIPINE BESYLATE 5 MG/1
10 TABLET ORAL DAILY
Status: DISCONTINUED | OUTPATIENT
Start: 2023-06-13 | End: 2023-06-14 | Stop reason: HOSPADM

## 2023-06-12 RX ORDER — INSULIN LISPRO 100 [IU]/ML
0-4 INJECTION, SOLUTION INTRAVENOUS; SUBCUTANEOUS NIGHTLY
Status: DISCONTINUED | OUTPATIENT
Start: 2023-06-12 | End: 2023-06-14 | Stop reason: HOSPADM

## 2023-06-12 RX ADMIN — ACETAMINOPHEN 500 MG: 500 TABLET ORAL at 22:44

## 2023-06-12 RX ADMIN — INSULIN GLARGINE 25 UNITS: 100 INJECTION, SOLUTION SUBCUTANEOUS at 22:33

## 2023-06-12 RX ADMIN — DONEPEZIL HYDROCHLORIDE 5 MG: 5 TABLET, FILM COATED ORAL at 22:33

## 2023-06-12 RX ADMIN — CEFTRIAXONE SODIUM 1000 MG: 1 INJECTION, POWDER, FOR SOLUTION INTRAMUSCULAR; INTRAVENOUS at 16:53

## 2023-06-12 RX ADMIN — SODIUM CHLORIDE 500 ML: 9 INJECTION, SOLUTION INTRAVENOUS at 14:10

## 2023-06-12 ASSESSMENT — LIFESTYLE VARIABLES: HOW OFTEN DO YOU HAVE A DRINK CONTAINING ALCOHOL: NEVER

## 2023-06-12 NOTE — ED PROVIDER NOTES
[facility] Emergency Department      Pt Name: Edgardo Douglas  MRN: [id number]  [place] 1945  Date of evaluation: 6/12/2023  Provider: Felicia Brennan MD  I independently performed a history and physical on Edgardo Douglas. All diagnostic, treatment, and disposition decisions were made by myself in conjunction with the advanced practice provider. HPI: Edgardo Douglas presented with   Chief Complaint   Patient presents with    Altered Mental Status     Arrived per  EMS from [address] d/t AMS started approx 1p; ; VSS; hx of BBB; Edgardo Douglas has a past medical history of Alzheimer's disease, unspecified ([address] Arthritis, Aural vertigo, unspecified ear, Cancer ([address] Cataract, Crohn disease ([address] Dementia ([address] Dental bridge present, Dental crowns present, Diabetes mellitus ([address] Ductal carcinoma in situ of left breast (01/26/2016), Encounter for loop recorder at end of battery life (11/17/2014), Goiter, Hearing impairment, Herniated lumbar intervertebral disc (04/14/2016), History of pancreatitis (01/01/1976), Hypercholesteremia, Hypertension, IBS (irritable bowel syndrome), Insomnia, unspecified, Intraductal carcinoma in situ of left breast, LBBB (left bundle branch block), Need for assistance with personal care, Noninfective gastroenteritis and colitis, unspecified, Osteoarthritis of foot (03/21/2014), Other specified postprocedural states, Personal history of malignant neoplasm of breast, Primary osteoarthritis, Radiculopathy, lumbar region, Recurrent UTI, Right-sided low back pain with right-sided sciatica (03/23/2016), Rosacea (01/28/2015), Spinal stenosis, Spinal stenosis, lumbar region without neurogenic claudication, Tibialis posterior tendinitis (03/25/2013), Type II or unspecified type diabetes mellitus without mention of complication, not stated as uncontrolled, and Unspecified hearing loss, unspecified ear.   She has a past surgical history that includes

## 2023-06-12 NOTE — ED NOTES
This RN and Jania Alvarado RN performed pericare on pt. Pt had loose bowel movement. Pt cleaned and new brief put on her. Emily Taylor also in place at this time.       Imani Garnett RN  06/12/23 6313

## 2023-06-12 NOTE — ED PROVIDER NOTES
[facility]        Pt Name: Milly Irizarry  MRN: [id number]  [place] 1945  Date of evaluation: 6/12/2023  Provider: Marciano Murrieta PA-C  PCP: No primary care provider on file. Note Started: 2:15 PM EDT 6/12/23       I have seen and evaluated this patient with my supervising physician Durell Hatchet, *. CHIEF COMPLAINT       Chief Complaint   Patient presents with    Altered Mental Status     Arrived per  EMS from [address] [place] [address] d/t AMS started approx 1p; ; VSS; hx of BBB;        HISTORY OF PRESENT ILLNESS: 1 or more Elements     History From: EMS  Limitations to history : Altered Mental Status    Milly Irizarry is a [de-identified] y.o. female who presents to the emergency department via EMS from [facility]. Per EMS the patient was eating lunch and acting normally until about 1 PM when she became unresponsive and was not answering questions. Blood sugar in the field was over 300. Vitals are unremarkable. She was following certain directions in the squad. Unable to obtain any history from the patient. Nursing Notes were all reviewed and agreed with or any disagreements were addressed in the HPI. REVIEW OF SYSTEMS :      Review of Systems    Positives and Pertinent negatives as per HPI.      SURGICAL HISTORY     Past Surgical History:   Procedure Laterality Date    AXILLARY SURGERY Left     BREAST BIOPSY  10/5/12    BREAST LUMPECTOMY  11 6 12    with axillary nodedissection    CARDIAC SURGERY  2010    coronary angiogram-no blockage    CARPAL TUNNEL RELEASE      bilateral    COLONOSCOPY      EYE SURGERY Bilateral     Cataracts    HYSTERECTOMY (CERVIX STATUS UNKNOWN)      INSERTABLE CARDIAC MONITOR      LUMBAR SPINE SURGERY  04/14/2016    BILATERAL DECOMPRESSIVE LUMBAR LAMINECTOMY L4-L5, RIGHT       CURRENTMEDICATIONS       Previous Medications    ACETAMINOPHEN (TYLENOL) 500 MG TABLET    Take 1 tablet by

## 2023-06-13 LAB
ANION GAP SERPL CALCULATED.3IONS-SCNC: 13 MMOL/L (ref 3–16)
BACTERIA UR CULT: NORMAL
BUN SERPL-MCNC: 22 MG/DL (ref 7–20)
CALCIUM SERPL-MCNC: 8.5 MG/DL (ref 8.3–10.6)
CHLORIDE SERPL-SCNC: 107 MMOL/L (ref 99–110)
CO2 SERPL-SCNC: 23 MMOL/L (ref 21–32)
CREAT SERPL-MCNC: 1.1 MG/DL (ref 0.6–1.2)
DEPRECATED RDW RBC AUTO: 14.1 % (ref 12.4–15.4)
EST. AVERAGE GLUCOSE BLD GHB EST-MCNC: 208.7 MG/DL
GFR SERPLBLD CREATININE-BSD FMLA CKD-EPI: 51 ML/MIN/{1.73_M2}
GLUCOSE BLD-MCNC: 102 MG/DL (ref 70–99)
GLUCOSE BLD-MCNC: 170 MG/DL (ref 70–99)
GLUCOSE BLD-MCNC: 172 MG/DL (ref 70–99)
GLUCOSE BLD-MCNC: 59 MG/DL (ref 70–99)
GLUCOSE BLD-MCNC: 97 MG/DL (ref 70–99)
GLUCOSE SERPL-MCNC: 63 MG/DL (ref 70–99)
HBA1C MFR BLD: 8.9 %
HCT VFR BLD AUTO: 32.4 % (ref 36–48)
HGB BLD-MCNC: 10.8 G/DL (ref 12–16)
MCH RBC QN AUTO: 32.1 PG (ref 26–34)
MCHC RBC AUTO-ENTMCNC: 33.4 G/DL (ref 31–36)
MCV RBC AUTO: 95.9 FL (ref 80–100)
PERFORMED ON: ABNORMAL
PERFORMED ON: NORMAL
PLATELET # BLD AUTO: 216 K/UL (ref 135–450)
PMV BLD AUTO: 8.1 FL (ref 5–10.5)
POTASSIUM SERPL-SCNC: 3.3 MMOL/L (ref 3.5–5.1)
RBC # BLD AUTO: 3.38 M/UL (ref 4–5.2)
SODIUM SERPL-SCNC: 143 MMOL/L (ref 136–145)
WBC # BLD AUTO: 6.4 K/UL (ref 4–11)

## 2023-06-13 PROCEDURE — 1200000000 HC SEMI PRIVATE

## 2023-06-13 PROCEDURE — 80048 BASIC METABOLIC PNL TOTAL CA: CPT

## 2023-06-13 PROCEDURE — 6370000000 HC RX 637 (ALT 250 FOR IP): Performed by: INTERNAL MEDICINE

## 2023-06-13 PROCEDURE — 6360000002 HC RX W HCPCS: Performed by: INTERNAL MEDICINE

## 2023-06-13 PROCEDURE — 83036 HEMOGLOBIN GLYCOSYLATED A1C: CPT

## 2023-06-13 PROCEDURE — 92610 EVALUATE SWALLOWING FUNCTION: CPT

## 2023-06-13 PROCEDURE — 36415 COLL VENOUS BLD VENIPUNCTURE: CPT

## 2023-06-13 PROCEDURE — 6360000002 HC RX W HCPCS: Performed by: NURSE PRACTITIONER

## 2023-06-13 PROCEDURE — 2500000003 HC RX 250 WO HCPCS: Performed by: NURSE PRACTITIONER

## 2023-06-13 PROCEDURE — 2580000003 HC RX 258: Performed by: INTERNAL MEDICINE

## 2023-06-13 PROCEDURE — 2580000003 HC RX 258: Performed by: PHYSICIAN ASSISTANT

## 2023-06-13 PROCEDURE — 6360000002 HC RX W HCPCS: Performed by: PHYSICIAN ASSISTANT

## 2023-06-13 PROCEDURE — 85027 COMPLETE CBC AUTOMATED: CPT

## 2023-06-13 RX ORDER — HALOPERIDOL 5 MG/ML
5 INJECTION INTRAMUSCULAR EVERY 4 HOURS PRN
Status: DISCONTINUED | OUTPATIENT
Start: 2023-06-13 | End: 2023-06-14 | Stop reason: HOSPADM

## 2023-06-13 RX ORDER — POTASSIUM CHLORIDE 750 MG/1
20 TABLET, FILM COATED, EXTENDED RELEASE ORAL 2 TIMES DAILY
Status: DISCONTINUED | OUTPATIENT
Start: 2023-06-13 | End: 2023-06-14 | Stop reason: HOSPADM

## 2023-06-13 RX ORDER — LORAZEPAM 2 MG/ML
0.5 INJECTION INTRAMUSCULAR ONCE
Status: DISCONTINUED | OUTPATIENT
Start: 2023-06-13 | End: 2023-06-14 | Stop reason: HOSPADM

## 2023-06-13 RX ORDER — DEXTROSE MONOHYDRATE 100 MG/ML
INJECTION, SOLUTION INTRAVENOUS CONTINUOUS PRN
Status: DISCONTINUED | OUTPATIENT
Start: 2023-06-13 | End: 2023-06-14 | Stop reason: HOSPADM

## 2023-06-13 RX ORDER — HALOPERIDOL 5 MG/ML
2 INJECTION INTRAMUSCULAR ONCE
Status: COMPLETED | OUTPATIENT
Start: 2023-06-13 | End: 2023-06-13

## 2023-06-13 RX ADMIN — MICONAZOLE NITRATE: 20 POWDER TOPICAL at 01:57

## 2023-06-13 RX ADMIN — FLUOXETINE 10 MG: 10 CAPSULE ORAL at 12:30

## 2023-06-13 RX ADMIN — SODIUM CHLORIDE: 9 INJECTION, SOLUTION INTRAVENOUS at 15:41

## 2023-06-13 RX ADMIN — HALOPERIDOL LACTATE 5 MG: 5 INJECTION, SOLUTION INTRAMUSCULAR at 14:17

## 2023-06-13 RX ADMIN — ENOXAPARIN SODIUM 40 MG: 100 INJECTION SUBCUTANEOUS at 15:44

## 2023-06-13 RX ADMIN — POTASSIUM CHLORIDE 20 MEQ: 750 TABLET, FILM COATED, EXTENDED RELEASE ORAL at 21:49

## 2023-06-13 RX ADMIN — AMLODIPINE BESYLATE 10 MG: 5 TABLET ORAL at 12:30

## 2023-06-13 RX ADMIN — DONEPEZIL HYDROCHLORIDE 5 MG: 5 TABLET, FILM COATED ORAL at 21:49

## 2023-06-13 RX ADMIN — CEFTRIAXONE SODIUM 1000 MG: 1 INJECTION, POWDER, FOR SOLUTION INTRAMUSCULAR; INTRAVENOUS at 15:43

## 2023-06-13 RX ADMIN — POTASSIUM CHLORIDE 20 MEQ: 750 TABLET, FILM COATED, EXTENDED RELEASE ORAL at 12:54

## 2023-06-13 RX ADMIN — HALOPERIDOL LACTATE 5 MG: 5 INJECTION, SOLUTION INTRAMUSCULAR at 18:25

## 2023-06-13 RX ADMIN — HALOPERIDOL LACTATE 2 MG: 5 INJECTION, SOLUTION INTRAMUSCULAR at 01:57

## 2023-06-13 RX ADMIN — MICONAZOLE NITRATE: 20 POWDER TOPICAL at 21:49

## 2023-06-13 RX ADMIN — MICONAZOLE NITRATE: 20 POWDER TOPICAL at 13:15

## 2023-06-13 NOTE — CARE COORDINATION
Case Management Assessment  Initial Evaluation    Date/Time of Evaluation: 6/13/2023 3:40 PM  Assessment Completed by: MAGNOLIA George    If patient is discharged prior to next notation, then this note serves as note for discharge by case management. Patient Name: Cash Montana                   YOB: 1945  Diagnosis: Altered mental status [R41.82]  Acute cystitis without hematuria [N30.00]  Acute encephalopathy [G93.40]                   Date / Time: 6/12/2023  1:28 PM    Patient Admission Status: Inpatient   Readmission Risk (Low < 19, Mod (19-27), High > 27): Readmission Risk Score: 19.4    Current PCP: No primary care provider on file. PCP verified by CM? Yes    Chart Reviewed: Yes      History Provided by: Child/Family  Patient Orientation: Unresponsive    Patient Cognition: Dementia / Early Alzheimer's    Hospitalization in the last 30 days (Readmission):  Yes    If yes, Readmission Assessment in CM Navigator will be completed. Advance Directives:      Code Status: Full Code   Patient's Primary Decision Maker is: Legal Next of Kin    Primary Decision MakerRshabana Encarnacion Child - [phone]    Secondary Decision Maker: Norrisogene Meigs Brother/Sister - [phone]    Discharge Planning:    Patient lives with:  Other (Comment) (LTC staff) Type of Home: Long-Term Care  Primary Care Giver:    Patient Support Systems include: Family Members   Current Financial resources: Medicare  Current community resources: Assisted Living  Current services prior to admission: [facility]            Current DME:              Type of Home Care services:  None    ADLS  Prior functional level: Assistance with the following:, Bathing, Dressing, Toileting, Feeding, Cooking, Housework, Shopping, Mobility  Current functional level: Assistance with the following:, Bathing, Dressing, Toileting, Feeding, Cooking, Housework, Shopping, Mobility    PT AM-PAC:   /24  OT AM-PAC:   /24    Family can

## 2023-06-13 NOTE — CARE COORDINATION
06/13/23 1534   Readmission Assessment   Number of Days since last admission? 8-30 days   Previous Disposition SNF   Who is being Interviewed Caregiver  (Son, Alex Christianson.)   What was the patient's/caregiver's perception as to why they think they needed to return back to the hospital? Other (Comment)  (AMS)   Did you visit your Primary Care Physician after you left the hospital, before you returned this time? No   Why weren't you able to visit your PCP? Did not have an appointment   Did you see a specialist, such as Cardiac, Pulmonary, Orthopedic Physician, etc. after you left the hospital? No   Who advised the patient to return to the hospital? Physician   Does the patient report anything that got in the way of taking their medications? No   In our efforts to provide the best possible care to you and others like you, can you think of anything that we could have done to help you after you left the hospital the first time, so that you might not have needed to return so soon?  Other (Comment)  (IV antibitoics may not have been completed.)         Electronically signed by MAGNOLIA Grossman on 6/13/2023 at 3:34 PM

## 2023-06-13 NOTE — PLAN OF CARE
Problem: Discharge Planning  Goal: Discharge to home or other facility with appropriate resources  Outcome: Progressing     Problem: Pain  Goal: Verbalizes/displays adequate comfort level or baseline comfort level  Outcome: Progressing     Problem: Skin/Tissue Integrity  Goal: Absence of new skin breakdown  Description: 1. Monitor for areas of redness and/or skin breakdown  2. Assess vascular access sites hourly  3. Every 4-6 hours minimum:  Change oxygen saturation probe site  4. Every 4-6 hours:  If on nasal continuous positive airway pressure, respiratory therapy assess nares and determine need for appliance change or resting period. Outcome: Progressing     Problem: Safety - Adult  Goal: Free from fall injury  Outcome: Progressing     Problem: ABCDS Injury Assessment  Goal: Absence of physical injury  Outcome: Progressing     Problem: Safety - Medical Restraint  Goal: Remains free of injury from restraints (Restraint for Interference with Medical Device)  Description: INTERVENTIONS:  1. Determine that other, less restrictive measures have been tried or would not be effective before applying the restraint  2. Evaluate the patient's condition at the time of restraint application  3. Inform patient/family regarding the reason for restraint  4.  Q2H: Monitor safety, psychosocial status, comfort, nutrition and hydration  Outcome: Progressing

## 2023-06-13 NOTE — H&P
[place] [address]                     [facility], [address]                              HISTORY AND PHYSICAL    PATIENT NAME: Melissa Molina                  :        1945  MED REC NO:   [id number]                          ROOM:       0803  ACCOUNT NO:   [de-identified]                           ADMIT DATE: 2023  PROVIDER:     Wendie Kaur MD    HISTORY OF PRESENT ILLNESS:  The patient is an 39-year-old white  American lady who has dementia and very hard of hearing, came to the  emergency room with history of altered mental status. Arrived from  [facility] due to altered mental status started approximately at  01:00 p.m. Blood sugar was 343. Her vital signs were stable. Per EMS  the patient was eating lunch and acting normally until 01:00 p.m. and  then she became unresponsive and not answering questions. Blood sugar  in the field was 300. Vitals were unremarkable. She was following  certain direction in squad, but responding with very few words without  any understanding. So she was brought in for further evaluation since  this was definitely changed in her normal baseline pattern. PAST MEDICAL HISTORY:  Pertinent for Alzheimer's disease,  osteoarthritis, vertigo, cancer, cataract, Crohn's disease, dementia,  dental bridge breath and dental crowns, type 2 diabetes mellitus,  intraductal carcinoma in situ of the left breast, hearing impairment,  herniated lumbar disc, pancreatitis, hyperlipidemia, hypertension,  irritable bowel, insomnia, left bundle branch block, gastroenteritis,  osteoarthritis, rosacea, spinal stenosis, tibialis posterior tendonitis,  unspecified hearing loss. PAST SURGICAL HISTORY:  Pertinent for cardiac coronary angiogram, breast  biopsy, breast lumpectomy, lumbar spine surgery, carpal tunnel release,  hysterectomy, insertable cardiac monitor, eye surgery, colonoscopy and  axillary surgery.     FAMILY HISTORY:

## 2023-06-14 VITALS
DIASTOLIC BLOOD PRESSURE: 80 MMHG | BODY MASS INDEX: 31.24 KG/M2 | TEMPERATURE: 97.4 F | HEART RATE: 74 BPM | OXYGEN SATURATION: 95 % | WEIGHT: 183 LBS | RESPIRATION RATE: 18 BRPM | SYSTOLIC BLOOD PRESSURE: 143 MMHG | HEIGHT: 64 IN

## 2023-06-14 LAB
ANION GAP SERPL CALCULATED.3IONS-SCNC: 8 MMOL/L (ref 3–16)
BUN SERPL-MCNC: 19 MG/DL (ref 7–20)
CALCIUM SERPL-MCNC: 8.5 MG/DL (ref 8.3–10.6)
CHLORIDE SERPL-SCNC: 106 MMOL/L (ref 99–110)
CO2 SERPL-SCNC: 24 MMOL/L (ref 21–32)
CREAT SERPL-MCNC: 1 MG/DL (ref 0.6–1.2)
DEPRECATED RDW RBC AUTO: 13.9 % (ref 12.4–15.4)
EKG ATRIAL RATE: 55 BPM
EKG DIAGNOSIS: NORMAL
EKG P AXIS: 71 DEGREES
EKG P-R INTERVAL: 170 MS
EKG Q-T INTERVAL: 514 MS
EKG QRS DURATION: 136 MS
EKG QTC CALCULATION (BAZETT): 491 MS
EKG R AXIS: -30 DEGREES
EKG T AXIS: 51 DEGREES
EKG VENTRICULAR RATE: 55 BPM
GFR SERPLBLD CREATININE-BSD FMLA CKD-EPI: 57 ML/MIN/{1.73_M2}
GLUCOSE BLD-MCNC: 112 MG/DL (ref 70–99)
GLUCOSE BLD-MCNC: 113 MG/DL (ref 70–99)
GLUCOSE BLD-MCNC: 128 MG/DL (ref 70–99)
GLUCOSE SERPL-MCNC: 124 MG/DL (ref 70–99)
HCT VFR BLD AUTO: 32.8 % (ref 36–48)
HGB BLD-MCNC: 11.1 G/DL (ref 12–16)
MCH RBC QN AUTO: 32.4 PG (ref 26–34)
MCHC RBC AUTO-ENTMCNC: 33.8 G/DL (ref 31–36)
MCV RBC AUTO: 96.1 FL (ref 80–100)
PERFORMED ON: ABNORMAL
PLATELET # BLD AUTO: 193 K/UL (ref 135–450)
PMV BLD AUTO: 8.6 FL (ref 5–10.5)
POTASSIUM SERPL-SCNC: 3.7 MMOL/L (ref 3.5–5.1)
RBC # BLD AUTO: 3.41 M/UL (ref 4–5.2)
SODIUM SERPL-SCNC: 138 MMOL/L (ref 136–145)
WBC # BLD AUTO: 4.7 K/UL (ref 4–11)

## 2023-06-14 PROCEDURE — 36415 COLL VENOUS BLD VENIPUNCTURE: CPT

## 2023-06-14 PROCEDURE — 85027 COMPLETE CBC AUTOMATED: CPT

## 2023-06-14 PROCEDURE — 93010 ELECTROCARDIOGRAM REPORT: CPT | Performed by: INTERNAL MEDICINE

## 2023-06-14 PROCEDURE — 6360000002 HC RX W HCPCS: Performed by: INTERNAL MEDICINE

## 2023-06-14 PROCEDURE — 80048 BASIC METABOLIC PNL TOTAL CA: CPT

## 2023-06-14 RX ORDER — POTASSIUM CHLORIDE 1500 MG/1
20 TABLET, EXTENDED RELEASE ORAL 2 TIMES DAILY
Qty: 20 TABLET | Refills: 0 | Status: SHIPPED | OUTPATIENT
Start: 2023-06-14 | End: 2023-06-24

## 2023-06-14 RX ORDER — CIPROFLOXACIN 500 MG/1
500 TABLET, FILM COATED ORAL EVERY 12 HOURS SCHEDULED
Qty: 6 TABLET | Refills: 0 | Status: SHIPPED | OUTPATIENT
Start: 2023-06-14 | End: 2023-06-17

## 2023-06-14 RX ORDER — CIPROFLOXACIN 500 MG/1
500 TABLET, FILM COATED ORAL EVERY 12 HOURS SCHEDULED
Status: DISCONTINUED | OUTPATIENT
Start: 2023-06-14 | End: 2023-06-14 | Stop reason: HOSPADM

## 2023-06-14 RX ADMIN — HALOPERIDOL LACTATE 5 MG: 5 INJECTION, SOLUTION INTRAMUSCULAR at 00:07

## 2023-06-14 ASSESSMENT — PAIN SCALES - GENERAL: PAINLEVEL_OUTOF10: 0

## 2023-06-14 NOTE — CARE COORDINATION
Case Management -  Discharge Note      Patient Name: Francisco Isabel                   YOB: 1945            Readmission Risk (Low < 19, Mod (19-27), High > 27): Readmission Risk Score: 19.4    Current PCP: No primary care provider on file. Patient/patient representative has acknowledged the information provided and decided on the following discharge plan. Patient/ patient representative has been provided freedom of choice regarding service provider, supported by basic dialogue that supports the patient's individualized plan of care/goals. Patient noted to have a discharge order.   Pt has been medically cleared to return to [address] [place] [address]    Patient discharged to   General acute hospital ([address] [place] [address])  [facility], [facility] [address]  Phone: [phone]  Fax: [phone]        Transportation scheduled for 12:00pm  Transportation provided by "[employer]  ([phone])   AVS faxed and agency notified: Yes  Family Notified: Yes  Nurse to call report to facility      Financial    Payor: Deborah Bumpers / Plan: Yohan Duenas PPO / Product Type: Medicare /       Electronically signed by MAGNOLIA Chakraborty on 6/14/2023 at 9:09 AM

## 2023-06-14 NOTE — DISCHARGE INSTR - COC
Continuity of Care Form    Patient Name: Ki Ware   :  1945  MRN:  [id number]    Admit date:  2023  Discharge date:  2023    Code Status Order: Full Code   Advance Directives:     Admitting Physician:  Mandi Zimmer MD  PCP: No primary care provider on file. Discharging Nurse: Lindsey Cheng Unit/Room#: 9ND-2106/4047-14  Discharging Unit Phone Number: [phone]    Emergency Contact:   Extended Emergency Contact Information  Primary Emergency Contact: Marcie Macias  Address:             Joy Snow [address] Phone: [phone]  Mobile Phone: [phone]  Relation: Child  Secondary Emergency Contact:  [address]  Mobile Phone: [phone]  Relation: Brother/Sister    Past Surgical History:  Past Surgical History:   Procedure Laterality Date    AXILLARY SURGERY Left     BREAST BIOPSY  10/5/12    BREAST LUMPECTOMY  12    with axillary nodedissection    CARDIAC SURGERY      coronary angiogram-no blockage    CARPAL TUNNEL RELEASE      bilateral    COLONOSCOPY      EYE SURGERY Bilateral     Cataracts    HYSTERECTOMY (CERVIX STATUS UNKNOWN)      INSERTABLE CARDIAC MONITOR      LUMBAR SPINE SURGERY  2016    BILATERAL DECOMPRESSIVE LUMBAR LAMINECTOMY L4-L5, RIGHT       Immunization History:   Immunization History   Administered Date(s) Administered    COVID-19, PFIZER GRAY top, DO NOT Dilute, (age 15 y+), IM, 30 mcg/0.3 mL 2022    COVID-19, PFIZER PURPLE top, DILUTE for use, (age 15 y+), 30mcg/0.3mL 2021, 2021, 10/05/2021    Influenza A ([id number]),all Formulations 2010    Influenza Virus Vaccine 10/04/2012, 10/17/2013, 10/15/2015, 2020    Influenza Whole 10/14/2002, 10/22/2004, 10/24/2005, 2006, 10/19/2007, 10/28/2008, 2009, 10/13/2011    Influenza, FLUZONE (age 72 y+), High Dose, 0.7mL 2021, 2022    Influenza, High Dose (Fluzone 65 yrs and older) 10/15/2010,

## 2023-06-21 NOTE — PROGRESS NOTES
4 Eyes Skin Assessment     NAME:  Aishwarya Main  YOB: 1945  MEDICAL RECORD NUMBER:  [id number]    The patient is being assessed for  Admission    I agree that at least one RN has performed a thorough Head to Toe Skin Assessment on the patient. ALL assessment sites listed below have been assessed. Areas assessed by both nurses:    Head, Face, Ears, Shoulders, Back, Chest, Arms, Elbows, Hands, Sacrum. Buttock, Coccyx, Ischium, Legs. Feet and Heels, and Under Medical Devices         Does the Patient have a Wound?  No noted wound(s)       Pedro Prevention initiated by RN: Yes  Wound Care Orders initiated by RN: No    Pressure Injury (Stage 3,4, Unstageable, DTI, NWPT, and Complex wounds) if present, place Wound referral order by RN under : No    New Ostomies, if present place, Ostomy referral order under : No     Nurse 1 eSignature: Electronically signed by Michael Car RN on 6/13/23 at 3:35 AM EDT    **SHARE this note so that the co-signing nurse can place an eSignature**    Nurse 2 eSignature: {Esignature:[id number]}
Appropriate coverage finally reached after several issues with perfect serve that caused delay in care as this RN was not connected to the appropriate person as perfect serve both indicated and was supposed to have done so.  Issues now being resolved for patient care and issues with perfect serve will be addressed with both IT and Unit manager
Data- discharge order received, pt and family (appointed legal authority) verbalized agreement to discharge, disposition to [address][phone], [facility] Nora Grigsby reviewed and signed by physician. Action- AVS prepared, LEON completed/ reported faxed by case management/. Discharge instruction summary: Diet- regular, 5 carb control, Activity- bedrest. Medications prescriptions to be filled at receiving facility. Transfer code status: Full Code, LDAs to remain with discharge: none. DME used: none. Response- Bedside RN tried to call report to [facility]. Got an answer, but was then left on hold and never got the nurse receiving the patient to answer back. Pt belongings gathered, peripheral IV and cardiac monitoring removed. Disposition to Discharged via cart/stretcher and via ambulance to assisted living by EMS transportation, no complications reported. 1. WEIGHT: Admit Weight - Scale: 182 lb 9.6 oz (82.8 kg) (06/12/23 2013)        Today  Weight - Scale: 183 lb (83 kg) (06/14/23 0744)       2.  O2 SAT.: SpO2: 95 % (06/14/23 1100)
Facility/Department: [address]  Speech Language Pathology  DYSPHAGIA BEDSIDE SWALLOW EVALUATION     Patient: Annika Blas   : 1945   MRN: [id number]      Evaluation Date: 2023   Admitting Diagnosis: Altered mental status [R41.82]  Acute cystitis without hematuria [N30.00]  Acute encephalopathy [G93.40]  Pain: Did not state                                                       H&P: The patient is an 57-year-old white  American lady who has dementia and very hard of hearing, came to the  emergency room with history of altered mental status. Arrived from  [facility] due to altered mental status started approximately at  01:00 p.m. Blood sugar was 343. Her vital signs were stable. Per EMS  the patient was eating lunch and acting normally until 01:00 p.m. and  then she became unresponsive and not answering questions. Blood sugar  in the field was 300. Vitals were unremarkable. She was following  certain direction in squad, but responding with very few words without  any understanding. So she was brought in for further evaluation since  this was definitely changed in her normal baseline pattern. Imaging:  Chest X-ray:   IMPRESSION:  No radiographic evidence of acute pulmonary disease. Head CT: IMPRESSION:  1. No acute intracranial findings. 2. Approximately 5.4 cm right node cyst in the left middle cranial fossa,  unchanged. 3. Mild volume loss and chronic microvascular ischemic changes similar to the  prior study. History/Prior Level of Function:   Living Status: ECF  Prior Dysphagia History: Per chart review, prior clinical swallow evaluation completed at this facility on 2023. A regular diet with thin liquids was recommended at that time with no further dysphagia therapy indicated.    Reason for referral: SLP evaluation orders received due to altered mental status    Dysphagia Impressions/Diagnosis: Oropharyngeal Dysphagia   Pt was seen sitting upright in bed on
First Perfect serve contact received by RN was at [address] [facility] on 06/13/22023  Messaged as received below    6/13/2023 1:26 AM  [address] or Facility: [facility]  From: Karen Mariscal  RE: Kati Tinoco  1945  RM: [id number]  Dr Julius Rojo was not able to be reached and this patient is oulling out IVs and becoming combative I can't keep an IV in and a large part of that is due to her constantly scrathing yeast infection in her groin/fold area. Pt then starts to get upset and combative when we try to clean her. Can we please get something anything she needs IV antibiotics, vital signs are stable.  Maybe even benadryl or anything to help with the itching  Need Callback: NO CALLBACK REQ  3T ROUTINE
Hospital Problems             Last Modified POA    * (Principal) Altered mental status 6/12/2023 Yes    Dementia ([facility] 6/12/2023 Yes    Recurrent UTI 6/12/2023 Yes    Type 2 diabetes mellitus with hyperosmolarity, uncontrolled ([facility] 6/12/2023 Yes    Hypercholesteremia 6/12/2023 Yes    Hypertension 6/12/2023 Yes    Crohn disease ([facility] 6/12/2023 Yes    Overview Signed 6/12/2023 11:17 PM by Kadie Moran MD     POSSIBLE DIAGNOSIS            H&P dictated
OK to leave IV out per Dr. Rafita Chowdary note on [email or web address]
Over night coverage tried to be reached multiple times on this patient and there has been zero response from FERNANDO Bell but messages have been marked as read. Another message sent after PIV pulled out again and patient started to get combative and perfectserve kept generating phone numbers for Dr Mark Rocha and each number was called and each time the number was connected to an answering machine which for this situation is not appropriate and unsafe. An attempt will be made to reach out to Caridad José who is in house tonight and if that attempt fails another route will be chosen to at least get an in house provider at bedside to provide what is needed for this patient.
Patient is sleeping and becomes combative/agitated when woken up. RN tried to do vitals on patient this morning, patient moved her arm away. RN will attempt afternoon vitals at the time. Patient is now sleeping in bed, side rails up x3, bed alarm in place, with camera in room and room door open for being a high fall risk.
Physical/Occupational Therapy  Brendan Gonzalez    Attempted to see pt for PT/OT evaluation. Patient agitated and resisting care. Will hold therapy at this time and will follow up with pt as schedule permits.  Thanks, Pavel Fuentes, PT, DPT [id number], Kylah Amin, MOT OTR/L [id number]
Physician Progress Note      Laurel Cervantes  CSN #:                  [id number]  :                       1945  ADMIT DATE:       2023 1:28 PM  [address] DATE:        2023 11:50 AM  RESPONDING  PROVIDER #:        Mandi Zimmer MD          QUERY TEXT:    Pt admitted with AMS from baseline and UTI. If possible, please document in   the progress notes and discharge summary if you are evaluating and / or   treating any of the following: The medical record reflects the following:  Risk Factors: UTI  Clinical Indicators: ED \"(at NH) eating lunch and then suddenly became altered   and not acting herself. She does have baseline Alzheimer's dementia. She   begins actually talking here more than when she initially presented to the   emergency department; AMS from nursing home. Had improvement after monitored   a brief period of time in the ED. Acute encephalopathy. \" Glucose on admit:   340. H&P \"Type 2 diabetes mellitus with hyperosmolarity, Urinary tract   infection, altered mental status\". Treatment: CT Head, Rocephin, discharged on Cipro, MDSSI, Glucose monitoring  Options provided:  -- Metabolic encephalopathy on arrival due to UTI, resolved  -- Metabolic encephalopathy on arrival due to Type 2 diabetes mellitus with   hyperosmolarity, resolved  -- Other - I will add my own diagnosis  -- Disagree - Not applicable / Not valid  -- Disagree - Clinically unable to determine / Unknown  -- Refer to Clinical Documentation Reviewer    PROVIDER RESPONSE TEXT:    Provider disagreed with this query. no encephalopathy . just waxing waning of chronic dementia    Query created by: Lenore Rosario on 2023 12:44 PM      Electronically signed by:   Mandi Zimmer MD 2023 11:20 AM
Pt has had a total of 5 failed PIV attempts after IM Haldol given. Pt will need DIT/PICC RN to insert PIV.
Went with day shift RN to do bedside handoff report. While checking lines, realized patient had pulled out her PIV. Found the IV in her bed, intact, no bleeding or other complications at prior IV site, dressing applied. Per report, this is the second IV loss since her admission. Notified Hospitalist.    Complete bed change. New gown put on, incontinent briefs, refusing external catheter. Bed locked, alarmed, lowest position, low bed, camera for safety. VSS. Disoriented. Report given to Katiuska Lugo [facility].
(L) 70 - 99 mg/dL    BUN 22 (H) 7 - 20 mg/dL    Creatinine 1.1 0.6 - 1.2 mg/dL    Est, Glom Filt Rate 51 (A) >60    Calcium 8.5 8.3 - 10.6 mg/dL   Hemoglobin A1C    Collection Time: 06/13/23  5:25 AM   Result Value Ref Range    Hemoglobin A1C 8.9 See comment %    eAG 208.7 mg/dL   POCT Glucose    Collection Time: 06/13/23  7:50 AM   Result Value Ref Range    POC Glucose 59 (L) 70 - 99 mg/dl    Performed on ACCU-CHEK    POCT Glucose    Collection Time: 06/13/23  8:53 AM   Result Value Ref Range    POC Glucose 97 70 - 99 mg/dl    Performed on ACCU-CHEK    POCT Glucose    Collection Time: 06/13/23 11:33 AM   Result Value Ref Range    POC Glucose 102 (H) 70 - 99 mg/dl    Performed on ACCU-CHEK    POCT Glucose    Collection Time: 06/13/23  4:02 PM   Result Value Ref Range    POC Glucose 172 (H) 70 - 99 mg/dl    Performed on ACCU-CHEK    POCT Glucose    Collection Time: 06/13/23  9:41 PM   Result Value Ref Range    POC Glucose 170 (H) 70 - 99 mg/dl    Performed on [facility] Problems             Last Modified POA    * (Principal) Altered mental status 6/12/2023 Yes    Dementia ([facility] [address] 6/12/2023 Yes    Recurrent UTI 6/12/2023 Yes    Type 2 diabetes mellitus with hyperosmolarity, uncontrolled ([address] 6/12/2023 Yes    Hypercholesteremia 6/12/2023 Yes    Hypertension 6/12/2023 Yes    Crohn disease ([address] 6/12/2023 Yes    Overview Signed 6/12/2023 11:17 PM by Marielos Al MD     POSSIBLE DIAGNOSIS            Haldol prn , ct IV antibiotics    Await cultures    She is not amenable to SNF due to advanced dementia and lack of participation   Consider dismissal    Supplement K

## 2023-07-04 PROBLEM — E86.0 DEHYDRATION: Status: RESOLVED | Noted: 2023-06-04 | Resolved: 2023-07-04

## 2023-08-01 ENCOUNTER — TELEPHONE (OUTPATIENT)
Dept: FAMILY MEDICINE CLINIC | Age: 78
End: 2023-08-01

## 2023-09-14 ENCOUNTER — HOSPITAL ENCOUNTER (INPATIENT)
Age: 78
LOS: 5 days | Discharge: HOME | DRG: 193 | End: 2023-09-19
Attending: EMERGENCY MEDICINE | Admitting: INTERNAL MEDICINE
Payer: MEDICARE

## 2023-09-14 ENCOUNTER — APPOINTMENT (OUTPATIENT)
Dept: CT IMAGING | Age: 78
DRG: 193 | End: 2023-09-14
Payer: MEDICARE

## 2023-09-14 ENCOUNTER — APPOINTMENT (OUTPATIENT)
Dept: GENERAL RADIOLOGY | Age: 78
DRG: 193 | End: 2023-09-14
Payer: MEDICARE

## 2023-09-14 DIAGNOSIS — N39.0 URINARY TRACT INFECTION IN FEMALE: ICD-10-CM

## 2023-09-14 DIAGNOSIS — R41.82 ALTERED MENTAL STATUS, UNSPECIFIED ALTERED MENTAL STATUS TYPE: Primary | ICD-10-CM

## 2023-09-14 PROBLEM — J96.00 ACUTE RESPIRATORY FAILURE (HCC): Status: ACTIVE | Noted: 2023-09-14

## 2023-09-14 PROBLEM — J18.9 PNEUMONIA: Status: ACTIVE | Noted: 2023-09-14

## 2023-09-14 PROBLEM — R40.0 SOMNOLENCE: Status: ACTIVE | Noted: 2023-09-14

## 2023-09-14 LAB
ALBUMIN SERPL-MCNC: 3.4 G/DL (ref 3.4–5)
ALBUMIN/GLOB SERPL: 1.5 {RATIO} (ref 1.1–2.2)
ALP SERPL-CCNC: 106 U/L (ref 40–129)
ALT SERPL-CCNC: 9 U/L (ref 10–40)
AMMONIA PLAS-SCNC: 12 UMOL/L (ref 11–51)
ANION GAP SERPL CALCULATED.3IONS-SCNC: 8 MMOL/L (ref 3–16)
APTT BLD: 26.1 SEC (ref 22.7–35.9)
AST SERPL-CCNC: 11 U/L (ref 15–37)
BACTERIA URNS QL MICRO: ABNORMAL /HPF
BASOPHILS # BLD: 0 K/UL (ref 0–0.2)
BASOPHILS NFR BLD: 0.7 %
BILIRUB DIRECT SERPL-MCNC: <0.2 MG/DL (ref 0–0.3)
BILIRUB INDIRECT SERPL-MCNC: NORMAL MG/DL (ref 0–1)
BILIRUB SERPL-MCNC: 0.3 MG/DL (ref 0–1)
BILIRUB UR QL STRIP.AUTO: NEGATIVE
BUN SERPL-MCNC: 19 MG/DL (ref 7–20)
CALCIUM SERPL-MCNC: 8.4 MG/DL (ref 8.3–10.6)
CHLORIDE SERPL-SCNC: 105 MMOL/L (ref 99–110)
CK SERPL-CCNC: 51 U/L (ref 26–192)
CLARITY UR: ABNORMAL
CO2 SERPL-SCNC: 25 MMOL/L (ref 21–32)
COLOR UR: YELLOW
CREAT SERPL-MCNC: 0.9 MG/DL (ref 0.6–1.2)
DEPRECATED RDW RBC AUTO: 14.7 % (ref 12.4–15.4)
EOSINOPHIL # BLD: 0.4 K/UL (ref 0–0.6)
EOSINOPHIL NFR BLD: 6.1 %
EPI CELLS #/AREA URNS AUTO: 0 /HPF (ref 0–5)
GFR SERPLBLD CREATININE-BSD FMLA CKD-EPI: >60 ML/MIN/{1.73_M2}
GLUCOSE BLD-MCNC: 144 MG/DL (ref 70–99)
GLUCOSE SERPL-MCNC: 140 MG/DL (ref 70–99)
GLUCOSE UR STRIP.AUTO-MCNC: >=1000 MG/DL
HCT VFR BLD AUTO: 39.7 % (ref 36–48)
HGB BLD-MCNC: 12.9 G/DL (ref 12–16)
HGB UR QL STRIP.AUTO: ABNORMAL
HYALINE CASTS #/AREA URNS AUTO: 1 /LPF (ref 0–8)
INR PPP: 1.02 (ref 0.84–1.16)
KETONES UR STRIP.AUTO-MCNC: NEGATIVE MG/DL
LACTATE BLDV-SCNC: 1.6 MMOL/L (ref 0.4–2)
LEUKOCYTE ESTERASE UR QL STRIP.AUTO: ABNORMAL
LIPASE SERPL-CCNC: 11 U/L (ref 13–60)
LYMPHOCYTES # BLD: 0.9 K/UL (ref 1–5.1)
LYMPHOCYTES NFR BLD: 12.7 %
MAGNESIUM SERPL-MCNC: 1.8 MG/DL (ref 1.8–2.4)
MCH RBC QN AUTO: 30.8 PG (ref 26–34)
MCHC RBC AUTO-ENTMCNC: 32.4 G/DL (ref 31–36)
MCV RBC AUTO: 94.9 FL (ref 80–100)
MONOCYTES # BLD: 0.4 K/UL (ref 0–1.3)
MONOCYTES NFR BLD: 6 %
NEUTROPHILS # BLD: 5.1 K/UL (ref 1.7–7.7)
NEUTROPHILS NFR BLD: 74.5 %
NITRITE UR QL STRIP.AUTO: POSITIVE
PERFORMED ON: ABNORMAL
PH UR STRIP.AUTO: 5 [PH] (ref 5–8)
PLATELET # BLD AUTO: 235 K/UL (ref 135–450)
PMV BLD AUTO: 8.6 FL (ref 5–10.5)
POTASSIUM SERPL-SCNC: 3.5 MMOL/L (ref 3.5–5.1)
PROT SERPL-MCNC: 5.7 G/DL (ref 6.4–8.2)
PROT UR STRIP.AUTO-MCNC: 30 MG/DL
PROTHROMBIN TIME: 13.4 SEC (ref 11.5–14.8)
RBC # BLD AUTO: 4.19 M/UL (ref 4–5.2)
RBC CLUMPS #/AREA URNS AUTO: 0 /HPF (ref 0–4)
REASON FOR REJECTION: NORMAL
REJECTED TEST: NORMAL
SODIUM SERPL-SCNC: 138 MMOL/L (ref 136–145)
SP GR UR STRIP.AUTO: 1.02 (ref 1–1.03)
TROPONIN, HIGH SENSITIVITY: 24 NG/L (ref 0–14)
UA COMPLETE W REFLEX CULTURE PNL UR: YES
UA DIPSTICK W REFLEX MICRO PNL UR: YES
URN SPEC COLLECT METH UR: ABNORMAL
UROBILINOGEN UR STRIP-ACNC: 1 E.U./DL
WBC # BLD AUTO: 6.8 K/UL (ref 4–11)
WBC #/AREA URNS AUTO: 829 /HPF (ref 0–5)

## 2023-09-14 PROCEDURE — 71045 X-RAY EXAM CHEST 1 VIEW: CPT

## 2023-09-14 PROCEDURE — 83605 ASSAY OF LACTIC ACID: CPT

## 2023-09-14 PROCEDURE — 87186 SC STD MICRODIL/AGAR DIL: CPT

## 2023-09-14 PROCEDURE — 83690 ASSAY OF LIPASE: CPT

## 2023-09-14 PROCEDURE — 82248 BILIRUBIN DIRECT: CPT

## 2023-09-14 PROCEDURE — 80053 COMPREHEN METABOLIC PANEL: CPT

## 2023-09-14 PROCEDURE — 6370000000 HC RX 637 (ALT 250 FOR IP): Performed by: INTERNAL MEDICINE

## 2023-09-14 PROCEDURE — 6360000002 HC RX W HCPCS: Performed by: INTERNAL MEDICINE

## 2023-09-14 PROCEDURE — 81001 URINALYSIS AUTO W/SCOPE: CPT

## 2023-09-14 PROCEDURE — 6360000002 HC RX W HCPCS: Performed by: EMERGENCY MEDICINE

## 2023-09-14 PROCEDURE — 85025 COMPLETE CBC W/AUTO DIFF WBC: CPT

## 2023-09-14 PROCEDURE — 2580000003 HC RX 258: Performed by: EMERGENCY MEDICINE

## 2023-09-14 PROCEDURE — 96374 THER/PROPH/DIAG INJ IV PUSH: CPT

## 2023-09-14 PROCEDURE — 83735 ASSAY OF MAGNESIUM: CPT

## 2023-09-14 PROCEDURE — 2060000000 HC ICU INTERMEDIATE R&B

## 2023-09-14 PROCEDURE — 84484 ASSAY OF TROPONIN QUANT: CPT

## 2023-09-14 PROCEDURE — 1200000000 HC SEMI PRIVATE

## 2023-09-14 PROCEDURE — 85730 THROMBOPLASTIN TIME PARTIAL: CPT

## 2023-09-14 PROCEDURE — 70450 CT HEAD/BRAIN W/O DYE: CPT

## 2023-09-14 PROCEDURE — 87086 URINE CULTURE/COLONY COUNT: CPT

## 2023-09-14 PROCEDURE — 2580000003 HC RX 258: Performed by: INTERNAL MEDICINE

## 2023-09-14 PROCEDURE — 87088 URINE BACTERIA CULTURE: CPT

## 2023-09-14 PROCEDURE — 93005 ELECTROCARDIOGRAM TRACING: CPT | Performed by: PHYSICIAN ASSISTANT

## 2023-09-14 PROCEDURE — 85610 PROTHROMBIN TIME: CPT

## 2023-09-14 PROCEDURE — 87040 BLOOD CULTURE FOR BACTERIA: CPT

## 2023-09-14 PROCEDURE — 82550 ASSAY OF CK (CPK): CPT

## 2023-09-14 PROCEDURE — 82140 ASSAY OF AMMONIA: CPT

## 2023-09-14 PROCEDURE — 99285 EMERGENCY DEPT VISIT HI MDM: CPT

## 2023-09-14 RX ORDER — GABAPENTIN 100 MG/1
200 CAPSULE ORAL 2 TIMES DAILY
Status: ON HOLD | COMMUNITY
End: 2023-09-19 | Stop reason: HOSPADM

## 2023-09-14 RX ORDER — INSULIN LISPRO 100 [IU]/ML
5 INJECTION, SOLUTION INTRAVENOUS; SUBCUTANEOUS
Status: DISCONTINUED | OUTPATIENT
Start: 2023-09-15 | End: 2023-09-19 | Stop reason: HOSPADM

## 2023-09-14 RX ORDER — RISPERIDONE 0.25 MG/1
0.5 TABLET ORAL 2 TIMES DAILY
Status: DISCONTINUED | OUTPATIENT
Start: 2023-09-14 | End: 2023-09-19

## 2023-09-14 RX ORDER — 0.9 % SODIUM CHLORIDE 0.9 %
500 INTRAVENOUS SOLUTION INTRAVENOUS ONCE
Status: COMPLETED | OUTPATIENT
Start: 2023-09-14 | End: 2023-09-14

## 2023-09-14 RX ORDER — SENNOSIDES 8.6 MG
650 CAPSULE ORAL 2 TIMES DAILY
Status: ON HOLD | COMMUNITY
End: 2023-09-19 | Stop reason: HOSPADM

## 2023-09-14 RX ORDER — INSULIN LISPRO 100 [IU]/ML
0-8 INJECTION, SOLUTION INTRAVENOUS; SUBCUTANEOUS 4 TIMES DAILY
Status: ON HOLD | COMMUNITY
End: 2023-09-19 | Stop reason: HOSPADM

## 2023-09-14 RX ORDER — ACETAMINOPHEN 500 MG
500 TABLET ORAL EVERY 6 HOURS PRN
Status: DISCONTINUED | OUTPATIENT
Start: 2023-09-14 | End: 2023-09-19 | Stop reason: HOSPADM

## 2023-09-14 RX ORDER — FLUOXETINE 10 MG/1
10 CAPSULE ORAL DAILY
Status: DISCONTINUED | OUTPATIENT
Start: 2023-09-15 | End: 2023-09-19 | Stop reason: HOSPADM

## 2023-09-14 RX ORDER — DEXTROSE MONOHYDRATE 100 MG/ML
INJECTION, SOLUTION INTRAVENOUS CONTINUOUS PRN
Status: DISCONTINUED | OUTPATIENT
Start: 2023-09-14 | End: 2023-09-19 | Stop reason: HOSPADM

## 2023-09-14 RX ORDER — TAMOXIFEN CITRATE 10 MG/1
20 TABLET ORAL DAILY
Status: DISCONTINUED | OUTPATIENT
Start: 2023-09-15 | End: 2023-09-19 | Stop reason: HOSPADM

## 2023-09-14 RX ORDER — POTASSIUM CHLORIDE 750 MG/1
20 TABLET, FILM COATED, EXTENDED RELEASE ORAL 2 TIMES DAILY
Status: DISCONTINUED | OUTPATIENT
Start: 2023-09-14 | End: 2023-09-19 | Stop reason: HOSPADM

## 2023-09-14 RX ORDER — IPRATROPIUM BROMIDE AND ALBUTEROL SULFATE 2.5; .5 MG/3ML; MG/3ML
1 SOLUTION RESPIRATORY (INHALATION) EVERY 4 HOURS PRN
Status: DISCONTINUED | OUTPATIENT
Start: 2023-09-14 | End: 2023-09-19 | Stop reason: HOSPADM

## 2023-09-14 RX ORDER — DONEPEZIL HYDROCHLORIDE 5 MG/1
5 TABLET, FILM COATED ORAL NIGHTLY
Status: DISCONTINUED | OUTPATIENT
Start: 2023-09-14 | End: 2023-09-19 | Stop reason: HOSPADM

## 2023-09-14 RX ORDER — BACITRACIN ZINC AND POLYMYXIN B SULFATE 500; 1000 [USP'U]/G; [USP'U]/G
OINTMENT TOPICAL
Status: DISPENSED
Start: 2023-09-14 | End: 2023-09-15

## 2023-09-14 RX ORDER — NICOTINE POLACRILEX 4 MG
15 LOZENGE BUCCAL SEE ADMIN INSTRUCTIONS
Status: DISCONTINUED | OUTPATIENT
Start: 2023-09-14 | End: 2023-09-14 | Stop reason: ALTCHOICE

## 2023-09-14 RX ORDER — IPRATROPIUM BROMIDE AND ALBUTEROL SULFATE 2.5; .5 MG/3ML; MG/3ML
1 SOLUTION RESPIRATORY (INHALATION)
Status: DISCONTINUED | OUTPATIENT
Start: 2023-09-14 | End: 2023-09-14

## 2023-09-14 RX ORDER — MORPHINE SULFATE 2 MG/ML
2 INJECTION, SOLUTION INTRAMUSCULAR; INTRAVENOUS EVERY 4 HOURS PRN
Status: DISCONTINUED | OUTPATIENT
Start: 2023-09-14 | End: 2023-09-19 | Stop reason: HOSPADM

## 2023-09-14 RX ORDER — INSULIN GLARGINE 100 [IU]/ML
25 INJECTION, SOLUTION SUBCUTANEOUS NIGHTLY
Status: DISCONTINUED | OUTPATIENT
Start: 2023-09-14 | End: 2023-09-19 | Stop reason: HOSPADM

## 2023-09-14 RX ORDER — SODIUM CHLORIDE 9 MG/ML
INJECTION, SOLUTION INTRAVENOUS CONTINUOUS
Status: DISCONTINUED | OUTPATIENT
Start: 2023-09-14 | End: 2023-09-19

## 2023-09-14 RX ORDER — AMLODIPINE BESYLATE 5 MG/1
10 TABLET ORAL DAILY
Status: DISCONTINUED | OUTPATIENT
Start: 2023-09-15 | End: 2023-09-15

## 2023-09-14 RX ADMIN — MORPHINE SULFATE 2 MG: 2 INJECTION, SOLUTION INTRAMUSCULAR; INTRAVENOUS at 17:14

## 2023-09-14 RX ADMIN — INSULIN GLARGINE 25 UNITS: 100 INJECTION, SOLUTION SUBCUTANEOUS at 23:17

## 2023-09-14 RX ADMIN — SODIUM CHLORIDE 500 ML: 9 INJECTION, SOLUTION INTRAVENOUS at 16:35

## 2023-09-14 RX ADMIN — DONEPEZIL HYDROCHLORIDE 5 MG: 5 TABLET, FILM COATED ORAL at 23:13

## 2023-09-14 RX ADMIN — RISPERIDONE 0.5 MG: 0.25 TABLET, FILM COATED ORAL at 23:13

## 2023-09-14 RX ADMIN — SODIUM CHLORIDE: 9 INJECTION, SOLUTION INTRAVENOUS at 23:26

## 2023-09-14 RX ADMIN — MEROPENEM 1000 MG: 1 INJECTION, POWDER, FOR SOLUTION INTRAVENOUS at 16:33

## 2023-09-14 RX ADMIN — MEROPENEM 1000 MG: 1 INJECTION, POWDER, FOR SOLUTION INTRAVENOUS at 23:30

## 2023-09-14 ASSESSMENT — PAIN SCALES - PAIN ASSESSMENT IN ADVANCED DEMENTIA (PAINAD)
FACIALEXPRESSION: 0
BREATHING: 1
NEGVOCALIZATION: 0
NEGVOCALIZATION: 0
BREATHING: 0
TOTALSCORE: 0
FACIALEXPRESSION: 1
BODYLANGUAGE: 1
TOTALSCORE: 1
CONSOLABILITY: 0
NEGVOCALIZATION: 0
CONSOLABILITY: 1
CONSOLABILITY: 0
NEGVOCALIZATION: 1
TOTALSCORE: 4
BREATHING: 0
FACIALEXPRESSION: 0
BODYLANGUAGE: 1
TOTALSCORE: 3
BREATHING: 0
BODYLANGUAGE: 1
BODYLANGUAGE: 0
FACIALEXPRESSION: 1
CONSOLABILITY: 0

## 2023-09-14 ASSESSMENT — PAIN - FUNCTIONAL ASSESSMENT: PAIN_FUNCTIONAL_ASSESSMENT: FACE, LEGS, ACTIVITY, CRY, AND CONSOLABILITY (FLACC)

## 2023-09-14 NOTE — ED NOTES
Left message for patients nurse on 3T to review patients chart for transfer of care.         Xander Truong RN  09/14/23 0698

## 2023-09-14 NOTE — ED NOTES
Abscess noted on pt right buttock with large redness surrounding boarder.  Dr. King Landon notified and abscess lanced and drained by md. Polysporin and mepilex placed per md.       Zeb Goodpasture, RN  09/14/23 8419

## 2023-09-14 NOTE — ED PROVIDER NOTES
In addition to the advanced practice provider, I personally saw Jeremiah Nieves and performed a substantive portion of the visit including all aspects of the medical decision making. Medical Decision Making  Patient seen and evaluated at bedside. Patient unresponsive with a GCS of 7 upon arrival.,  Given patient was hospice and nursing home, we opted to talk to [facility], patient's son Zeke Dubose, before any intervention was taken. I personally spoke to Desmond Patel, and he requested us to hold off intubation and/or invasive until he arrived to the hospital. Patient was hemodynamically stable and maintaining her airway during this time. We obtained a CT head which was negative for acute intracranial hemorrhage. While in the ED, patient did become alert and oriented with GCS of 15. Urinalysis positive with nitrites and prior urine culture showed ESBL. Patient started on meropenem for UTI. Patient's Desmond Patel, arrived to the hospital and I personally discussed goals of care with him. Patient will remain full code at this time until patient's other son, who lives in [place], comes into town and coordinates goals of care with Zeke Dubose. Patient will be taken off hospice service during this admission where she will be treated for UTI, and then will be placed back on hospice service after discharge. EKG  No acute ST elevation    SEP-1  Is this patient to be included in the SEP-1 Core Measure due to severe sepsis or septic shock? No   Exclusion criteria - the patient is NOT to be included for SEP-1 Core Measure due to:  May have criteria for sepsis, but does not meet criteria for severe sepsis or septic shock    Screenings     Vanessa Coma Scale  Eye Opening: To speech  Best Verbal Response: Confused  Best Motor Response: Obeys commands  Vanessa Coma Scale Score: 13             Patient Referrals:  No follow-up provider specified.     Discharge Medications:  New Prescriptions    No medications on file       FINAL

## 2023-09-14 NOTE — ED NOTES
ED TO INPATIENT SBAR HANDOFF    Patient Name: Valerie Brar   :  1945  78 y.o. MRN:  [id number]  Preferred Name  Yulisa Mendez.   ED Room #:  ED-0002/02  Family/Caregiver Present no   Restraints no   Sitter no   Sepsis Risk Score Sepsis Risk Score: 1.16    Situation  Code Status: Full Code No additional code details. Allergies: Amoxicillin-pot clavulanate, Nitrofuran derivatives, and Bactrim  Weight: Patient Vitals for the past 96 hrs (Last 3 readings):   Weight   23 1501 185 lb (83.9 kg)     Arrived from: nursing home  Chief Complaint:   Chief Complaint   Patient presents with    Altered Mental Status     Pt arrived via squad [facility] assisted living. Sleepy all day. Found unresponsive at nursing home. Per Squad (with no report from nurse at nursing home) she is suppose to go into hospice but no code status paperwork was with pt. Hospital Problem/Diagnosis:  Principal Problem:    Acute respiratory failure ([address])  Resolved Problems:    * No resolved hospital problems. *    Imaging:   CT HEAD WO CONTRAST   Final Result   No acute abnormality. Stable CT. XR CHEST PORTABLE   Final Result   Increased bibasilar airspace disease, either atelectasis or pneumonia.            Abnormal labs:   Abnormal Labs Reviewed   CBC WITH AUTO DIFFERENTIAL - Abnormal; Notable for the following components:       Result Value    Lymphocytes Absolute 0.9 (*)     All other components within normal limits   URINALYSIS WITH REFLEX TO CULTURE - Abnormal; Notable for the following components:    Clarity, UA TURBID (*)     Glucose, Ur >=1000 (*)     Blood, Urine TRACE (*)     Protein, UA 30 (*)     Nitrite, Urine POSITIVE (*)     Leukocyte Esterase, Urine MODERATE (*)     All other components within normal limits   MICROSCOPIC URINALYSIS - Abnormal; Notable for the following components:    Bacteria, UA 4+ (*)     WBC,  (*)     All other components within normal limits   COMPREHENSIVE METABOLIC PANEL -

## 2023-09-14 NOTE — ED NOTES
Pt arrived via squad [facility] assisted living. Sleepy all day. Found unresponsive at nursing home. Per Squad (with no report from nurse at nursing home) she is suppose to go into hospice but no code status paperwork was with pt at hospital. Supposedly DNR paperwork was wrong at nursing home. GCS 7/15. Unable to access safety/security questions in triage, due to, pts GCS 7 (see flow sheet)      Pt placed on a continuous pulse oximetry and telemetry monitoring. Bedside Monitor on with Alarms audible and alarms set. Pt on cycling blood pressure. Fall risk precautions in place, call light in reach, bed side table within reach, bed alarm on, will continue to monitor.        Jhony Ruiz, CLAUDY  09/14/23 2145

## 2023-09-14 NOTE — ED PROVIDER NOTES
[facility]        Pt Name: Khushbu Flanagan  MRN: [id number]  [address] [facility] 1945  Date of evaluation: 9/14/2023  Provider: Ozzie Gowers, PA-C  PCP: LUCIANA Briscoe - CNP  Note Started: 2:45 PM EDT 9/14/23       I have seen and evaluated this patient with my supervising physician Amie Moran, [address]       Chief Complaint   Patient presents with    Altered Mental Status     Pt arrived via [facility]. Sleepy all day. Found unresponsive at nursing home. Per Squ (with no report from nurse at nursing home) she is suppose to go into hospice but no code status paperwork was with pt. HISTORY OF PRESENT ILLNESS: 1 or more Elements     History From: ecf, ems  Limitations to history : Altered Mental Status    Khushbu Flanagan is a 78 y.o. female who presents unresponsive after being found on the ground. Patient lives in assisted living, history of pretty severe dementia. ECF reports that the patient has been sleepy all day. When I spoke with son he states that she has her days and nights mixed up and has been up all night, sleeping all day for the past couple of weeks. No additional information is able to be obtained at this time. Nursing Notes were all reviewed and agreed with or any disagreements were addressed in the HPI. REVIEW OF SYSTEMS :      Review of Systems   Unable to perform ROS: Patient unresponsive       Positives and Pertinent negatives as per HPI.      SURGICAL HISTORY     Past Surgical History:   Procedure Laterality Date    AXILLARY SURGERY Left     BREAST BIOPSY  10/5/12    BREAST LUMPECTOMY  11 6 12    with axillary nodedissection    CARDIAC SURGERY  2010    coronary angiogram-no blockage    CARPAL TUNNEL RELEASE      bilateral    COLONOSCOPY      EYE SURGERY Bilateral     Cataracts    HYSTERECTOMY (CERVIX STATUS UNKNOWN)      INSERTABLE CARDIAC MONITOR      LUMBAR

## 2023-09-15 LAB
ANION GAP SERPL CALCULATED.3IONS-SCNC: 10 MMOL/L (ref 3–16)
BASOPHILS # BLD: 0 K/UL (ref 0–0.2)
BASOPHILS NFR BLD: 0.5 %
BUN SERPL-MCNC: 17 MG/DL (ref 7–20)
CALCIUM SERPL-MCNC: 8.3 MG/DL (ref 8.3–10.6)
CHLORIDE SERPL-SCNC: 107 MMOL/L (ref 99–110)
CO2 SERPL-SCNC: 21 MMOL/L (ref 21–32)
CREAT SERPL-MCNC: 0.8 MG/DL (ref 0.6–1.2)
DEPRECATED RDW RBC AUTO: 14.5 % (ref 12.4–15.4)
EKG ATRIAL RATE: 60 BPM
EKG DIAGNOSIS: NORMAL
EKG P AXIS: 49 DEGREES
EKG P-R INTERVAL: 138 MS
EKG Q-T INTERVAL: 462 MS
EKG QRS DURATION: 130 MS
EKG QTC CALCULATION (BAZETT): 462 MS
EKG R AXIS: -30 DEGREES
EKG T AXIS: 77 DEGREES
EKG VENTRICULAR RATE: 60 BPM
EOSINOPHIL # BLD: 0.6 K/UL (ref 0–0.6)
EOSINOPHIL NFR BLD: 7.6 %
GFR SERPLBLD CREATININE-BSD FMLA CKD-EPI: >60 ML/MIN/{1.73_M2}
GLUCOSE BLD-MCNC: 146 MG/DL (ref 70–99)
GLUCOSE BLD-MCNC: 156 MG/DL (ref 70–99)
GLUCOSE BLD-MCNC: 160 MG/DL (ref 70–99)
GLUCOSE BLD-MCNC: 185 MG/DL (ref 70–99)
GLUCOSE BLD-MCNC: 58 MG/DL (ref 70–99)
GLUCOSE BLD-MCNC: 90 MG/DL (ref 70–99)
GLUCOSE SERPL-MCNC: 143 MG/DL (ref 70–99)
HCT VFR BLD AUTO: 36.3 % (ref 36–48)
HGB BLD-MCNC: 11.7 G/DL (ref 12–16)
LYMPHOCYTES # BLD: 0.7 K/UL (ref 1–5.1)
LYMPHOCYTES NFR BLD: 9.4 %
MCH RBC QN AUTO: 30.8 PG (ref 26–34)
MCHC RBC AUTO-ENTMCNC: 32.3 G/DL (ref 31–36)
MCV RBC AUTO: 95.5 FL (ref 80–100)
MONOCYTES # BLD: 0.7 K/UL (ref 0–1.3)
MONOCYTES NFR BLD: 8.9 %
NEUTROPHILS # BLD: 5.5 K/UL (ref 1.7–7.7)
NEUTROPHILS NFR BLD: 73.6 %
PERFORMED ON: ABNORMAL
PERFORMED ON: NORMAL
PLATELET # BLD AUTO: 192 K/UL (ref 135–450)
PMV BLD AUTO: 8.7 FL (ref 5–10.5)
POTASSIUM SERPL-SCNC: 3.9 MMOL/L (ref 3.5–5.1)
RBC # BLD AUTO: 3.8 M/UL (ref 4–5.2)
SODIUM SERPL-SCNC: 138 MMOL/L (ref 136–145)
WBC # BLD AUTO: 7.5 K/UL (ref 4–11)

## 2023-09-15 PROCEDURE — 6360000002 HC RX W HCPCS: Performed by: INTERNAL MEDICINE

## 2023-09-15 PROCEDURE — 80048 BASIC METABOLIC PNL TOTAL CA: CPT

## 2023-09-15 PROCEDURE — 6370000000 HC RX 637 (ALT 250 FOR IP): Performed by: INTERNAL MEDICINE

## 2023-09-15 PROCEDURE — 1200000000 HC SEMI PRIVATE

## 2023-09-15 PROCEDURE — 93010 ELECTROCARDIOGRAM REPORT: CPT | Performed by: INTERNAL MEDICINE

## 2023-09-15 PROCEDURE — 6370000000 HC RX 637 (ALT 250 FOR IP): Performed by: NURSE PRACTITIONER

## 2023-09-15 PROCEDURE — 36415 COLL VENOUS BLD VENIPUNCTURE: CPT

## 2023-09-15 PROCEDURE — 2580000003 HC RX 258: Performed by: INTERNAL MEDICINE

## 2023-09-15 PROCEDURE — 85025 COMPLETE CBC W/AUTO DIFF WBC: CPT

## 2023-09-15 RX ORDER — ENOXAPARIN SODIUM 100 MG/ML
40 INJECTION SUBCUTANEOUS DAILY
Status: DISCONTINUED | OUTPATIENT
Start: 2023-09-15 | End: 2023-09-19 | Stop reason: HOSPADM

## 2023-09-15 RX ORDER — AMLODIPINE BESYLATE 5 MG/1
5 TABLET ORAL DAILY
Status: DISCONTINUED | OUTPATIENT
Start: 2023-09-15 | End: 2023-09-19 | Stop reason: HOSPADM

## 2023-09-15 RX ORDER — DIPHENHYDRAMINE HYDROCHLORIDE, ZINC ACETATE 2; .1 G/100G; G/100G
CREAM TOPICAL 3 TIMES DAILY PRN
Status: DISCONTINUED | OUTPATIENT
Start: 2023-09-15 | End: 2023-09-19 | Stop reason: HOSPADM

## 2023-09-15 RX ORDER — HYDROXYZINE HYDROCHLORIDE 10 MG/1
10 TABLET, FILM COATED ORAL 3 TIMES DAILY PRN
Status: DISCONTINUED | OUTPATIENT
Start: 2023-09-15 | End: 2023-09-19

## 2023-09-15 RX ADMIN — RISPERIDONE 0.5 MG: 0.25 TABLET, FILM COATED ORAL at 21:36

## 2023-09-15 RX ADMIN — HYDROXYZINE HYDROCHLORIDE 10 MG: 10 TABLET, FILM COATED ORAL at 16:20

## 2023-09-15 RX ADMIN — POTASSIUM CHLORIDE 20 MEQ: 750 TABLET, FILM COATED, EXTENDED RELEASE ORAL at 21:36

## 2023-09-15 RX ADMIN — AMLODIPINE BESYLATE 5 MG: 5 TABLET ORAL at 08:57

## 2023-09-15 RX ADMIN — DONEPEZIL HYDROCHLORIDE 5 MG: 5 TABLET, FILM COATED ORAL at 21:36

## 2023-09-15 RX ADMIN — MORPHINE SULFATE 2 MG: 2 INJECTION, SOLUTION INTRAMUSCULAR; INTRAVENOUS at 14:21

## 2023-09-15 RX ADMIN — MORPHINE SULFATE 2 MG: 2 INJECTION, SOLUTION INTRAMUSCULAR; INTRAVENOUS at 18:09

## 2023-09-15 RX ADMIN — MEROPENEM 1000 MG: 1 INJECTION, POWDER, FOR SOLUTION INTRAVENOUS at 16:03

## 2023-09-15 RX ADMIN — MEROPENEM 1000 MG: 1 INJECTION, POWDER, FOR SOLUTION INTRAVENOUS at 08:57

## 2023-09-15 RX ADMIN — INSULIN LISPRO 5 UNITS: 100 INJECTION, SOLUTION INTRAVENOUS; SUBCUTANEOUS at 16:20

## 2023-09-15 RX ADMIN — DIPHENHYDRAMINE HYDROCHLORIDE, ZINC ACETATE: 2; .1 CREAM TOPICAL at 16:21

## 2023-09-15 RX ADMIN — MORPHINE SULFATE 2 MG: 2 INJECTION, SOLUTION INTRAMUSCULAR; INTRAVENOUS at 02:32

## 2023-09-15 RX ADMIN — DEXTROSE MONOHYDRATE 125 ML: 100 INJECTION, SOLUTION INTRAVENOUS at 21:29

## 2023-09-15 RX ADMIN — FLUOXETINE 10 MG: 10 CAPSULE ORAL at 11:42

## 2023-09-15 RX ADMIN — TAMOXIFEN CITRATE 20 MG: 10 TABLET, FILM COATED ORAL at 11:41

## 2023-09-15 RX ADMIN — ENOXAPARIN SODIUM 40 MG: 100 INJECTION SUBCUTANEOUS at 14:20

## 2023-09-15 RX ADMIN — RISPERIDONE 0.5 MG: 0.25 TABLET, FILM COATED ORAL at 08:57

## 2023-09-15 ASSESSMENT — PAIN SCALES - PAIN ASSESSMENT IN ADVANCED DEMENTIA (PAINAD)
CONSOLABILITY: 0
BODYLANGUAGE: 0
FACIALEXPRESSION: 0
BODYLANGUAGE: 0
FACIALEXPRESSION: 0
NEGVOCALIZATION: 0
TOTALSCORE: 0
TOTALSCORE: 0
NEGVOCALIZATION: 0
BODYLANGUAGE: 2
TOTALSCORE: 0
BREATHING: 0
CONSOLABILITY: 2
FACIALEXPRESSION: 0
BREATHING: 0
NEGVOCALIZATION: 2
FACIALEXPRESSION: 0
BREATHING: 0
CONSOLABILITY: 0
BODYLANGUAGE: 0
NEGVOCALIZATION: 0
CONSOLABILITY: 0
TOTALSCORE: 8
NEGVOCALIZATION: 0
FACIALEXPRESSION: 0
BODYLANGUAGE: 0
CONSOLABILITY: 0
TOTALSCORE: 0
BODYLANGUAGE: 0
FACIALEXPRESSION: 0
CONSOLABILITY: 0
NEGVOCALIZATION: 0
TOTALSCORE: 0
TOTALSCORE: 0
FACIALEXPRESSION: 0
BODYLANGUAGE: 0
BREATHING: 0
FACIALEXPRESSION: 2
BREATHING: 0
CONSOLABILITY: 0
BODYLANGUAGE: 0
BREATHING: 0
TOTALSCORE: 0
NEGVOCALIZATION: 0
BREATHING: 0
CONSOLABILITY: 0
BREATHING: 0
NEGVOCALIZATION: 0

## 2023-09-15 ASSESSMENT — PAIN SCALES - GENERAL
PAINLEVEL_OUTOF10: 0
PAINLEVEL_OUTOF10: 6

## 2023-09-15 ASSESSMENT — PAIN SCALES - WONG BAKER: WONGBAKER_NUMERICALRESPONSE: 0

## 2023-09-15 NOTE — CARE COORDINATION
Case Management Assessment  Initial Evaluation    Date/Time of Evaluation: 9/15/2023 11:32 AM  Assessment Completed by: MAGNOLIA Moraes    If patient is discharged prior to next notation, then this note serves as note for discharge by case management. Patient Name: Donavan Youngblood                   YOB: 1945  Diagnosis: Acute respiratory failure ([address]) [J96.00]  Altered mental status, unspecified altered mental status type [R41.82]  Urinary tract infection in female [N39.0]                   Date / Time: 9/14/2023  2:31 PM    Patient Admission Status: Inpatient   Readmission Risk (Low < 19, Mod (19-27), High > 27): Readmission Risk Score: 14.9    Current PCP: LUCIANA Esparza CNP  PCP verified by CM? Yes    Chart Reviewed: Yes      History Provided by: Other (see comment) (The  spoke with Ariadna Jenkins the admissions coordinator at [facility].)  Patient Orientation: Other (see comment) (The  spoke with Ariadna Jenkins the admissions coordinator at [facility].)    Patient Cognition: Other (see comment) (The  spoke with Ariadna Jenkins the admissions coordinator at [facility].)    Hospitalization in the last 30 days (Readmission):  No    If yes, Readmission Assessment in  Navigator will be completed. Advance Directives:      Code Status: Full Code   Patient's Primary Decision Maker is: Named in [facility]     Primary Decision Maker[facility] Hospital[facility] Child - [phone]    Secondary Decision Maker:  Ervin Briscoe Brother/Sister - [phone]    Discharge Planning:    Patient expects to discharge to: Assisted living  Plan for transportation at discharge:      Financial    Payor: Giuseppe Bellaain / Plan: Genia Gonsales PPO / Product Type: Medicare /         Current Assisted Living Information:  Patient admitted from:  [facility] ([address])  Jayy Galan, [address]  Phone: [phone]  Fax: [phone]     Call to

## 2023-09-15 NOTE — H&P
[address] [facility]                     [address], [address]                              HISTORY AND PHYSICAL    PATIENT NAME: George Ying                  :        1945  MED REC NO:   [id number]                          ROOM:       0  ACCOUNT NO:   [de-identified]                           ADMIT DATE: 2023  PROVIDER:     Pauline Freedman MD    HISTORY OF PRESENT ILLNESS:  The patient is an 12-year-old white  American lady known to me from previous admissions, came to the  emergency room with history of poor responsiveness with her [place] Coma  Scale at 7 upon arrival.  The patient was as such a hospice in a nursing  home. The patient sent back before any intervention was taken. The  durable power of , Lily Navarrete, he requested us to hold off  intubation and/or invasive until he arrived to the hospital.  The  patient was hemodynamically stable and maintaining her airway during  this time. The patient also had a head CT that was negative for acute  intracranial hemorrhage. While in the emergency room, the patient did  become little alert and oriented with a [place] Coma Scale of _____  (00:59). The patient also had urinary tract infection, which was  obvious. In the past, she has grown ESBL. The patient was started on  IV ______ (1:11). 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     PAST SURGICAL HISTORY:  Cardiac surgery with coronary angiogram, breast  biopsy, breast lumpectomy, lumbar spine surgery, carpal tunnel release,  hysterectomy, insertable cardiac

## 2023-09-15 NOTE — CARE COORDINATION
[facility] Continence Nurse  Consult Note       NAME:  Gallo Mittal  MEDICAL RECORD NUMBER:  [id number]  AGE: 78 y.o. GENDER: female  : 1945  TODAY'S DATE:  9/15/2023    Subjective   Reason for WOCN Evaluation and Assessment: abcess to right hip and smaller one to left buttock       Gallo Mittal is a 78 y.o. female referred by:   [x] Physician  [x] Nursing  [] Other:     Wound Identification:  Wound Type:  abcess  Contributing Factors:  none    Wound History: pressent on admission. Right hip abcess had I and D in emergency room.    Current Wound Care Treatment:  dressing     Patient Goal of Care:  [x] Wound Healing  [] Odor Control  [] Palliative Care  [] Pain Control   [] Other:         PAST MEDICAL HISTORY        Diagnosis Date    Alzheimer's disease, unspecified ([address])     Arthritis     hands, spine    Aural vertigo, unspecified ear     Cancer ([address])     LEFT BREAST    Cataract     Crohn disease ([address])     POSSIBLE DIAGNOSIS    Dementia ([address])     Dental bridge present     lower permanent    Dental crowns present     Venear upper anterior    Diabetes mellitus ([address])     type 2 without complications    Ductal carcinoma in situ of left breast 2016    Encounter for loop recorder at end of battery life 2014    Pt has [employer] Linq implant     Goiter     Hearing impairment     severe, secondary to Meniere's disease    Herniated lumbar intervertebral disc 2016    History of pancreatitis 1976    Hypercholesteremia     Hypertension     IBS (irritable bowel syndrome)     Insomnia, unspecified     Intraductal carcinoma in situ of left breast     LBBB (left bundle branch block)     Need for assistance with personal care     Noninfective gastroenteritis and colitis, unspecified     Osteoarthritis of foot 2014    Other specified postprocedural states     Personal history of malignant neoplasm of breast     Primary osteoarthritis     unspecified ankle and foot

## 2023-09-16 LAB
ANION GAP SERPL CALCULATED.3IONS-SCNC: 9 MMOL/L (ref 3–16)
BASOPHILS # BLD: 0.1 K/UL (ref 0–0.2)
BASOPHILS NFR BLD: 1.1 %
BUN SERPL-MCNC: 13 MG/DL (ref 7–20)
CALCIUM SERPL-MCNC: 8.4 MG/DL (ref 8.3–10.6)
CHLORIDE SERPL-SCNC: 103 MMOL/L (ref 99–110)
CO2 SERPL-SCNC: 24 MMOL/L (ref 21–32)
CREAT SERPL-MCNC: 0.7 MG/DL (ref 0.6–1.2)
DEPRECATED RDW RBC AUTO: 14.7 % (ref 12.4–15.4)
EOSINOPHIL # BLD: 0.4 K/UL (ref 0–0.6)
EOSINOPHIL NFR BLD: 8.3 %
GFR SERPLBLD CREATININE-BSD FMLA CKD-EPI: >60 ML/MIN/{1.73_M2}
GLUCOSE BLD-MCNC: 106 MG/DL (ref 70–99)
GLUCOSE BLD-MCNC: 106 MG/DL (ref 70–99)
GLUCOSE BLD-MCNC: 125 MG/DL (ref 70–99)
GLUCOSE BLD-MCNC: 132 MG/DL (ref 70–99)
GLUCOSE BLD-MCNC: 141 MG/DL (ref 70–99)
GLUCOSE BLD-MCNC: 90 MG/DL (ref 70–99)
GLUCOSE SERPL-MCNC: 133 MG/DL (ref 70–99)
HCT VFR BLD AUTO: 36.6 % (ref 36–48)
HGB BLD-MCNC: 12 G/DL (ref 12–16)
LYMPHOCYTES # BLD: 0.6 K/UL (ref 1–5.1)
LYMPHOCYTES NFR BLD: 10.6 %
MCH RBC QN AUTO: 31.2 PG (ref 26–34)
MCHC RBC AUTO-ENTMCNC: 32.9 G/DL (ref 31–36)
MCV RBC AUTO: 94.9 FL (ref 80–100)
MONOCYTES # BLD: 0.4 K/UL (ref 0–1.3)
MONOCYTES NFR BLD: 7.7 %
NEUTROPHILS # BLD: 3.9 K/UL (ref 1.7–7.7)
NEUTROPHILS NFR BLD: 72.3 %
PERFORMED ON: ABNORMAL
PERFORMED ON: NORMAL
PLATELET # BLD AUTO: 206 K/UL (ref 135–450)
PMV BLD AUTO: 8.7 FL (ref 5–10.5)
POTASSIUM SERPL-SCNC: 3.7 MMOL/L (ref 3.5–5.1)
RBC # BLD AUTO: 3.86 M/UL (ref 4–5.2)
SODIUM SERPL-SCNC: 136 MMOL/L (ref 136–145)
WBC # BLD AUTO: 5.4 K/UL (ref 4–11)

## 2023-09-16 PROCEDURE — 92610 EVALUATE SWALLOWING FUNCTION: CPT

## 2023-09-16 PROCEDURE — 92526 ORAL FUNCTION THERAPY: CPT

## 2023-09-16 PROCEDURE — 80048 BASIC METABOLIC PNL TOTAL CA: CPT

## 2023-09-16 PROCEDURE — 6360000002 HC RX W HCPCS: Performed by: INTERNAL MEDICINE

## 2023-09-16 PROCEDURE — 1200000000 HC SEMI PRIVATE

## 2023-09-16 PROCEDURE — 6370000000 HC RX 637 (ALT 250 FOR IP): Performed by: INTERNAL MEDICINE

## 2023-09-16 PROCEDURE — 51702 INSERT TEMP BLADDER CATH: CPT

## 2023-09-16 PROCEDURE — 2580000003 HC RX 258: Performed by: INTERNAL MEDICINE

## 2023-09-16 PROCEDURE — 36415 COLL VENOUS BLD VENIPUNCTURE: CPT

## 2023-09-16 PROCEDURE — 85025 COMPLETE CBC W/AUTO DIFF WBC: CPT

## 2023-09-16 RX ADMIN — FLUOXETINE 10 MG: 10 CAPSULE ORAL at 08:04

## 2023-09-16 RX ADMIN — AMLODIPINE BESYLATE 5 MG: 5 TABLET ORAL at 08:04

## 2023-09-16 RX ADMIN — MEROPENEM 1000 MG: 1 INJECTION, POWDER, FOR SOLUTION INTRAVENOUS at 16:09

## 2023-09-16 RX ADMIN — RISPERIDONE 0.5 MG: 0.25 TABLET, FILM COATED ORAL at 08:04

## 2023-09-16 RX ADMIN — ENOXAPARIN SODIUM 40 MG: 100 INJECTION SUBCUTANEOUS at 08:04

## 2023-09-16 RX ADMIN — DONEPEZIL HYDROCHLORIDE 5 MG: 5 TABLET, FILM COATED ORAL at 19:51

## 2023-09-16 RX ADMIN — RISPERIDONE 0.5 MG: 0.25 TABLET, FILM COATED ORAL at 19:51

## 2023-09-16 RX ADMIN — MORPHINE SULFATE 2 MG: 2 INJECTION, SOLUTION INTRAMUSCULAR; INTRAVENOUS at 18:20

## 2023-09-16 RX ADMIN — SODIUM CHLORIDE: 9 INJECTION, SOLUTION INTRAVENOUS at 08:02

## 2023-09-16 RX ADMIN — TAMOXIFEN CITRATE 20 MG: 10 TABLET, FILM COATED ORAL at 11:57

## 2023-09-16 RX ADMIN — HYDROXYZINE HYDROCHLORIDE 10 MG: 10 TABLET, FILM COATED ORAL at 08:04

## 2023-09-16 RX ADMIN — MEROPENEM 1000 MG: 1 INJECTION, POWDER, FOR SOLUTION INTRAVENOUS at 00:20

## 2023-09-16 RX ADMIN — MORPHINE SULFATE 2 MG: 2 INJECTION, SOLUTION INTRAMUSCULAR; INTRAVENOUS at 08:04

## 2023-09-16 RX ADMIN — MEROPENEM 1000 MG: 1 INJECTION, POWDER, FOR SOLUTION INTRAVENOUS at 08:03

## 2023-09-16 RX ADMIN — POTASSIUM CHLORIDE 20 MEQ: 750 TABLET, FILM COATED, EXTENDED RELEASE ORAL at 19:51

## 2023-09-16 RX ADMIN — HYDROXYZINE HYDROCHLORIDE 10 MG: 10 TABLET, FILM COATED ORAL at 18:20

## 2023-09-16 ASSESSMENT — PAIN SCALES - PAIN ASSESSMENT IN ADVANCED DEMENTIA (PAINAD)
TOTALSCORE: 0
BREATHING: 0
BODYLANGUAGE: 0
BREATHING: 0
BODYLANGUAGE: 0
CONSOLABILITY: 0
CONSOLABILITY: 0
FACIALEXPRESSION: 0
FACIALEXPRESSION: 0
TOTALSCORE: 0
FACIALEXPRESSION: 0
TOTALSCORE: 0
TOTALSCORE: 0
FACIALEXPRESSION: 0
CONSOLABILITY: 0
BODYLANGUAGE: 0
NEGVOCALIZATION: 0
CONSOLABILITY: 0
NEGVOCALIZATION: 0
FACIALEXPRESSION: 0
BODYLANGUAGE: 0
NEGVOCALIZATION: 0
FACIALEXPRESSION: 0
FACIALEXPRESSION: 0
BREATHING: 0
CONSOLABILITY: 0
CONSOLABILITY: 0
BODYLANGUAGE: 0
FACIALEXPRESSION: 0
FACIALEXPRESSION: 0
BODYLANGUAGE: 0
BREATHING: 0
NEGVOCALIZATION: 0
BREATHING: 0
NEGVOCALIZATION: 0
TOTALSCORE: 0
BODYLANGUAGE: 0
TOTALSCORE: 0
CONSOLABILITY: 0
BREATHING: 0
TOTALSCORE: 0
BREATHING: 0
NEGVOCALIZATION: 0
TOTALSCORE: 0
FACIALEXPRESSION: 0
NEGVOCALIZATION: 0
NEGVOCALIZATION: 0
TOTALSCORE: 0
CONSOLABILITY: 0
BREATHING: 0
NEGVOCALIZATION: 0
BODYLANGUAGE: 0
BODYLANGUAGE: 0
CONSOLABILITY: 0
CONSOLABILITY: 0
TOTALSCORE: 0
BREATHING: 0
BODYLANGUAGE: 0
CONSOLABILITY: 0
BODYLANGUAGE: 0
FACIALEXPRESSION: 0
TOTALSCORE: 0

## 2023-09-16 ASSESSMENT — PAIN SCALES - GENERAL: PAINLEVEL_OUTOF10: 6

## 2023-09-17 LAB
BACTERIA UR CULT: ABNORMAL
GLUCOSE BLD-MCNC: 153 MG/DL (ref 70–99)
GLUCOSE BLD-MCNC: 179 MG/DL (ref 70–99)
GLUCOSE BLD-MCNC: 83 MG/DL (ref 70–99)
GLUCOSE BLD-MCNC: 86 MG/DL (ref 70–99)
GLUCOSE BLD-MCNC: 89 MG/DL (ref 70–99)
GLUCOSE BLD-MCNC: 98 MG/DL (ref 70–99)
ORGANISM: ABNORMAL
PERFORMED ON: ABNORMAL
PERFORMED ON: ABNORMAL
PERFORMED ON: NORMAL

## 2023-09-17 PROCEDURE — 6370000000 HC RX 637 (ALT 250 FOR IP): Performed by: INTERNAL MEDICINE

## 2023-09-17 PROCEDURE — 1200000000 HC SEMI PRIVATE

## 2023-09-17 PROCEDURE — 92526 ORAL FUNCTION THERAPY: CPT

## 2023-09-17 PROCEDURE — 6360000002 HC RX W HCPCS: Performed by: INTERNAL MEDICINE

## 2023-09-17 PROCEDURE — 2580000003 HC RX 258: Performed by: INTERNAL MEDICINE

## 2023-09-17 RX ADMIN — INSULIN GLARGINE 25 UNITS: 100 INJECTION, SOLUTION SUBCUTANEOUS at 20:14

## 2023-09-17 RX ADMIN — MEROPENEM 1000 MG: 1 INJECTION, POWDER, FOR SOLUTION INTRAVENOUS at 15:59

## 2023-09-17 RX ADMIN — MEROPENEM 1000 MG: 1 INJECTION, POWDER, FOR SOLUTION INTRAVENOUS at 08:54

## 2023-09-17 RX ADMIN — POTASSIUM CHLORIDE 20 MEQ: 750 TABLET, FILM COATED, EXTENDED RELEASE ORAL at 08:55

## 2023-09-17 RX ADMIN — DONEPEZIL HYDROCHLORIDE 5 MG: 5 TABLET, FILM COATED ORAL at 20:14

## 2023-09-17 RX ADMIN — FLUOXETINE 10 MG: 10 CAPSULE ORAL at 08:55

## 2023-09-17 RX ADMIN — RISPERIDONE 0.5 MG: 0.25 TABLET, FILM COATED ORAL at 20:14

## 2023-09-17 RX ADMIN — MORPHINE SULFATE 2 MG: 2 INJECTION, SOLUTION INTRAMUSCULAR; INTRAVENOUS at 18:17

## 2023-09-17 RX ADMIN — RISPERIDONE 0.5 MG: 0.25 TABLET, FILM COATED ORAL at 08:57

## 2023-09-17 RX ADMIN — ENOXAPARIN SODIUM 40 MG: 100 INJECTION SUBCUTANEOUS at 08:55

## 2023-09-17 RX ADMIN — POTASSIUM CHLORIDE 20 MEQ: 750 TABLET, FILM COATED, EXTENDED RELEASE ORAL at 20:14

## 2023-09-17 RX ADMIN — TAMOXIFEN CITRATE 20 MG: 10 TABLET, FILM COATED ORAL at 08:55

## 2023-09-17 RX ADMIN — AMLODIPINE BESYLATE 5 MG: 5 TABLET ORAL at 08:55

## 2023-09-17 ASSESSMENT — PAIN SCALES - PAIN ASSESSMENT IN ADVANCED DEMENTIA (PAINAD)
CONSOLABILITY: 0
CONSOLABILITY: 0
TOTALSCORE: 0
TOTALSCORE: 0
BODYLANGUAGE: 0
CONSOLABILITY: 0
BREATHING: 0
NEGVOCALIZATION: 0
NEGVOCALIZATION: 0
TOTALSCORE: 0
BODYLANGUAGE: 0
TOTALSCORE: 0
BODYLANGUAGE: 0
BODYLANGUAGE: 0
FACIALEXPRESSION: 0
TOTALSCORE: 4
TOTALSCORE: 0
FACIALEXPRESSION: 0
FACIALEXPRESSION: 0
BREATHING: 0
TOTALSCORE: 0
FACIALEXPRESSION: 0
BODYLANGUAGE: 0
NEGVOCALIZATION: 0
CONSOLABILITY: 0
CONSOLABILITY: 0
FACIALEXPRESSION: 0
FACIALEXPRESSION: 0
FACIALEXPRESSION: 1
NEGVOCALIZATION: 0
NEGVOCALIZATION: 0
BREATHING: 0
CONSOLABILITY: 0
BREATHING: 0
CONSOLABILITY: 0
BREATHING: 1
FACIALEXPRESSION: 0
CONSOLABILITY: 0
FACIALEXPRESSION: 0
BREATHING: 0
TOTALSCORE: 0
BODYLANGUAGE: 0
FACIALEXPRESSION: 0
BREATHING: 0
FACIALEXPRESSION: 0
BODYLANGUAGE: 0
NEGVOCALIZATION: 0
NEGVOCALIZATION: 0
FACIALEXPRESSION: 0
CONSOLABILITY: 0
TOTALSCORE: 0
NEGVOCALIZATION: 0
BODYLANGUAGE: 0
FACIALEXPRESSION: 0
BREATHING: 0
CONSOLABILITY: 1
BREATHING: 0
NEGVOCALIZATION: 0
FACIALEXPRESSION: 0
NEGVOCALIZATION: 0
CONSOLABILITY: 0
CONSOLABILITY: 0
FACIALEXPRESSION: 0
CONSOLABILITY: 0
BREATHING: 0
NEGVOCALIZATION: 0
BODYLANGUAGE: 0
TOTALSCORE: 0
FACIALEXPRESSION: 0
TOTALSCORE: 0
CONSOLABILITY: 0
TOTALSCORE: 0
BODYLANGUAGE: 0
BODYLANGUAGE: 0
TOTALSCORE: 0
TOTALSCORE: 0
BODYLANGUAGE: 0
NEGVOCALIZATION: 1
NEGVOCALIZATION: 0
BREATHING: 0
TOTALSCORE: 0
BREATHING: 0
CONSOLABILITY: 0
NEGVOCALIZATION: 0
BODYLANGUAGE: 0
BODYLANGUAGE: 0
CONSOLABILITY: 0
TOTALSCORE: 0
BODYLANGUAGE: 0
NEGVOCALIZATION: 0
NEGVOCALIZATION: 0
BODYLANGUAGE: 0

## 2023-09-17 ASSESSMENT — PAIN SCALES - WONG BAKER
WONGBAKER_NUMERICALRESPONSE: 0

## 2023-09-17 ASSESSMENT — PAIN SCALES - GENERAL: PAINLEVEL_OUTOF10: 0

## 2023-09-17 NOTE — CARE COORDINATION
Chart reviewed. Per MD note yesterday : family still not on board with palliative care but I think they should consider. Pt on room air. PS message sent to MD pt is from [facility] and to please order therapy evaluations if he feels needed.      Leticia Mishra RN, BSN  [phone]

## 2023-09-18 PROBLEM — A49.9 ESBL (EXTENDED SPECTRUM BETA-LACTAMASE) PRODUCING BACTERIA INFECTION: Status: ACTIVE | Noted: 2023-09-18

## 2023-09-18 PROBLEM — Z16.12 ESBL (EXTENDED SPECTRUM BETA-LACTAMASE) PRODUCING BACTERIA INFECTION: Status: ACTIVE | Noted: 2023-09-18

## 2023-09-18 LAB
ANION GAP SERPL CALCULATED.3IONS-SCNC: 9 MMOL/L (ref 3–16)
BACTERIA BLD CULT ORG #2: NORMAL
BACTERIA BLD CULT: NORMAL
BUN SERPL-MCNC: 16 MG/DL (ref 7–20)
CALCIUM SERPL-MCNC: 8.1 MG/DL (ref 8.3–10.6)
CHLORIDE SERPL-SCNC: 104 MMOL/L (ref 99–110)
CO2 SERPL-SCNC: 21 MMOL/L (ref 21–32)
CREAT SERPL-MCNC: 0.8 MG/DL (ref 0.6–1.2)
DEPRECATED RDW RBC AUTO: 13.8 % (ref 12.4–15.4)
GFR SERPLBLD CREATININE-BSD FMLA CKD-EPI: >60 ML/MIN/{1.73_M2}
GLUCOSE BLD-MCNC: 135 MG/DL (ref 70–99)
GLUCOSE BLD-MCNC: 174 MG/DL (ref 70–99)
GLUCOSE BLD-MCNC: 231 MG/DL (ref 70–99)
GLUCOSE BLD-MCNC: 257 MG/DL (ref 70–99)
GLUCOSE BLD-MCNC: 89 MG/DL (ref 70–99)
GLUCOSE SERPL-MCNC: 111 MG/DL (ref 70–99)
HCT VFR BLD AUTO: 32.7 % (ref 36–48)
HGB BLD-MCNC: 10.9 G/DL (ref 12–16)
MCH RBC QN AUTO: 31.4 PG (ref 26–34)
MCHC RBC AUTO-ENTMCNC: 33.5 G/DL (ref 31–36)
MCV RBC AUTO: 93.8 FL (ref 80–100)
PERFORMED ON: ABNORMAL
PERFORMED ON: NORMAL
PLATELET # BLD AUTO: 203 K/UL (ref 135–450)
PMV BLD AUTO: 8.2 FL (ref 5–10.5)
POTASSIUM SERPL-SCNC: 3.8 MMOL/L (ref 3.5–5.1)
RBC # BLD AUTO: 3.48 M/UL (ref 4–5.2)
SODIUM SERPL-SCNC: 134 MMOL/L (ref 136–145)
WBC # BLD AUTO: 6.2 K/UL (ref 4–11)

## 2023-09-18 PROCEDURE — 36415 COLL VENOUS BLD VENIPUNCTURE: CPT

## 2023-09-18 PROCEDURE — 2580000003 HC RX 258: Performed by: INTERNAL MEDICINE

## 2023-09-18 PROCEDURE — 6370000000 HC RX 637 (ALT 250 FOR IP): Performed by: INTERNAL MEDICINE

## 2023-09-18 PROCEDURE — 6360000002 HC RX W HCPCS: Performed by: INTERNAL MEDICINE

## 2023-09-18 PROCEDURE — 92526 ORAL FUNCTION THERAPY: CPT

## 2023-09-18 PROCEDURE — 80048 BASIC METABOLIC PNL TOTAL CA: CPT

## 2023-09-18 PROCEDURE — 1200000000 HC SEMI PRIVATE

## 2023-09-18 PROCEDURE — 85027 COMPLETE CBC AUTOMATED: CPT

## 2023-09-18 RX ADMIN — AMLODIPINE BESYLATE 5 MG: 5 TABLET ORAL at 08:20

## 2023-09-18 RX ADMIN — MEROPENEM 1000 MG: 1 INJECTION, POWDER, FOR SOLUTION INTRAVENOUS at 01:41

## 2023-09-18 RX ADMIN — FLUOXETINE 10 MG: 10 CAPSULE ORAL at 08:20

## 2023-09-18 RX ADMIN — POTASSIUM CHLORIDE 20 MEQ: 750 TABLET, FILM COATED, EXTENDED RELEASE ORAL at 21:04

## 2023-09-18 RX ADMIN — INSULIN GLARGINE 25 UNITS: 100 INJECTION, SOLUTION SUBCUTANEOUS at 21:04

## 2023-09-18 RX ADMIN — MEROPENEM 1000 MG: 1 INJECTION, POWDER, FOR SOLUTION INTRAVENOUS at 08:20

## 2023-09-18 RX ADMIN — INSULIN LISPRO 5 UNITS: 100 INJECTION, SOLUTION INTRAVENOUS; SUBCUTANEOUS at 17:18

## 2023-09-18 RX ADMIN — SODIUM CHLORIDE: 9 INJECTION, SOLUTION INTRAVENOUS at 17:15

## 2023-09-18 RX ADMIN — INSULIN LISPRO 5 UNITS: 100 INJECTION, SOLUTION INTRAVENOUS; SUBCUTANEOUS at 12:37

## 2023-09-18 RX ADMIN — POTASSIUM CHLORIDE 20 MEQ: 750 TABLET, FILM COATED, EXTENDED RELEASE ORAL at 08:20

## 2023-09-18 RX ADMIN — HYDROXYZINE HYDROCHLORIDE 10 MG: 10 TABLET, FILM COATED ORAL at 21:09

## 2023-09-18 RX ADMIN — DONEPEZIL HYDROCHLORIDE 5 MG: 5 TABLET, FILM COATED ORAL at 21:04

## 2023-09-18 RX ADMIN — MEROPENEM 1000 MG: 1 INJECTION, POWDER, FOR SOLUTION INTRAVENOUS at 17:18

## 2023-09-18 RX ADMIN — TAMOXIFEN CITRATE 20 MG: 10 TABLET, FILM COATED ORAL at 08:21

## 2023-09-18 RX ADMIN — ENOXAPARIN SODIUM 40 MG: 100 INJECTION SUBCUTANEOUS at 08:21

## 2023-09-18 RX ADMIN — RISPERIDONE 0.5 MG: 0.25 TABLET, FILM COATED ORAL at 21:04

## 2023-09-18 RX ADMIN — RISPERIDONE 0.5 MG: 0.25 TABLET, FILM COATED ORAL at 08:20

## 2023-09-18 ASSESSMENT — PAIN SCALES - PAIN ASSESSMENT IN ADVANCED DEMENTIA (PAINAD)
TOTALSCORE: 0
TOTALSCORE: 0
BODYLANGUAGE: 0
NEGVOCALIZATION: 0
TOTALSCORE: 0
NEGVOCALIZATION: 0
TOTALSCORE: 0
FACIALEXPRESSION: 0
BREATHING: 0
NEGVOCALIZATION: 0
BREATHING: 0
CONSOLABILITY: 0
NEGVOCALIZATION: 0
BREATHING: 0
CONSOLABILITY: 0
BREATHING: 0
CONSOLABILITY: 0
BREATHING: 0
BODYLANGUAGE: 0
FACIALEXPRESSION: 0
FACIALEXPRESSION: 0
BREATHING: 0
NEGVOCALIZATION: 0
CONSOLABILITY: 0
BODYLANGUAGE: 0
CONSOLABILITY: 0
BREATHING: 0
NEGVOCALIZATION: 0
BODYLANGUAGE: 0
BREATHING: 0
TOTALSCORE: 0
NEGVOCALIZATION: 0
BREATHING: 0
FACIALEXPRESSION: 0
TOTALSCORE: 0
CONSOLABILITY: 0
BREATHING: 0
FACIALEXPRESSION: 0
CONSOLABILITY: 0
TOTALSCORE: 0
FACIALEXPRESSION: 0
FACIALEXPRESSION: 0
TOTALSCORE: 0
BODYLANGUAGE: 0
TOTALSCORE: 0
CONSOLABILITY: 0
BODYLANGUAGE: 0
FACIALEXPRESSION: 0
CONSOLABILITY: 0
TOTALSCORE: 0
FACIALEXPRESSION: 0
NEGVOCALIZATION: 0
BREATHING: 0
TOTALSCORE: 0
FACIALEXPRESSION: 0
CONSOLABILITY: 0
BODYLANGUAGE: 0
NEGVOCALIZATION: 0
CONSOLABILITY: 0
BODYLANGUAGE: 0
FACIALEXPRESSION: 0
BODYLANGUAGE: 0
NEGVOCALIZATION: 0
CONSOLABILITY: 0
NEGVOCALIZATION: 0
BREATHING: 0
BODYLANGUAGE: 0
NEGVOCALIZATION: 0
TOTALSCORE: 0
FACIALEXPRESSION: 0

## 2023-09-18 ASSESSMENT — PAIN SCALES - WONG BAKER
WONGBAKER_NUMERICALRESPONSE: 0

## 2023-09-19 VITALS
WEIGHT: 118.7 LBS | TEMPERATURE: 97.8 F | SYSTOLIC BLOOD PRESSURE: 144 MMHG | OXYGEN SATURATION: 95 % | HEIGHT: 64 IN | HEART RATE: 67 BPM | BODY MASS INDEX: 20.26 KG/M2 | RESPIRATION RATE: 18 BRPM | DIASTOLIC BLOOD PRESSURE: 75 MMHG

## 2023-09-19 LAB
GLUCOSE BLD-MCNC: 192 MG/DL (ref 70–99)
GLUCOSE BLD-MCNC: 97 MG/DL (ref 70–99)
GLUCOSE BLD-MCNC: 98 MG/DL (ref 70–99)
PERFORMED ON: ABNORMAL
PERFORMED ON: NORMAL
PERFORMED ON: NORMAL

## 2023-09-19 PROCEDURE — 97166 OT EVAL MOD COMPLEX 45 MIN: CPT

## 2023-09-19 PROCEDURE — 2580000003 HC RX 258: Performed by: INTERNAL MEDICINE

## 2023-09-19 PROCEDURE — 97530 THERAPEUTIC ACTIVITIES: CPT

## 2023-09-19 PROCEDURE — 6360000002 HC RX W HCPCS: Performed by: INTERNAL MEDICINE

## 2023-09-19 PROCEDURE — 92526 ORAL FUNCTION THERAPY: CPT

## 2023-09-19 PROCEDURE — 6370000000 HC RX 637 (ALT 250 FOR IP): Performed by: INTERNAL MEDICINE

## 2023-09-19 PROCEDURE — 97162 PT EVAL MOD COMPLEX 30 MIN: CPT

## 2023-09-19 PROCEDURE — 97535 SELF CARE MNGMENT TRAINING: CPT

## 2023-09-19 RX ORDER — SODIUM CHLORIDE 0.9 % (FLUSH) 0.9 %
5-40 SYRINGE (ML) INJECTION PRN
Status: DISCONTINUED | OUTPATIENT
Start: 2023-09-19 | End: 2023-09-19 | Stop reason: HOSPADM

## 2023-09-19 RX ORDER — GABAPENTIN 100 MG/1
200 CAPSULE ORAL 2 TIMES DAILY PRN
Qty: 60 CAPSULE | Refills: 0 | Status: SHIPPED | OUTPATIENT
Start: 2023-09-19 | End: 2023-10-19

## 2023-09-19 RX ORDER — HYDROXYZINE HYDROCHLORIDE 10 MG/1
10 TABLET, FILM COATED ORAL 3 TIMES DAILY PRN
Qty: 30 TABLET | Refills: 0 | Status: SHIPPED | OUTPATIENT
Start: 2023-09-19 | End: 2023-09-19 | Stop reason: HOSPADM

## 2023-09-19 RX ORDER — LIDOCAINE HYDROCHLORIDE 10 MG/ML
5 INJECTION, SOLUTION EPIDURAL; INFILTRATION; INTRACAUDAL; PERINEURAL ONCE
Status: DISCONTINUED | OUTPATIENT
Start: 2023-09-19 | End: 2023-09-19 | Stop reason: HOSPADM

## 2023-09-19 RX ORDER — SODIUM CHLORIDE 0.9 % (FLUSH) 0.9 %
5-40 SYRINGE (ML) INJECTION EVERY 12 HOURS SCHEDULED
Status: DISCONTINUED | OUTPATIENT
Start: 2023-09-19 | End: 2023-09-19 | Stop reason: HOSPADM

## 2023-09-19 RX ORDER — ENOXAPARIN SODIUM 100 MG/ML
40 INJECTION SUBCUTANEOUS DAILY
Qty: 10 ML | Refills: 0 | Status: SHIPPED | OUTPATIENT
Start: 2023-09-20

## 2023-09-19 RX ORDER — SODIUM CHLORIDE 9 MG/ML
25 INJECTION, SOLUTION INTRAVENOUS PRN
Status: DISCONTINUED | OUTPATIENT
Start: 2023-09-19 | End: 2023-09-19 | Stop reason: HOSPADM

## 2023-09-19 RX ORDER — RISPERIDONE 0.5 MG/1
0.5 TABLET ORAL 2 TIMES DAILY PRN
Qty: 30 TABLET | Refills: 0 | Status: SHIPPED | OUTPATIENT
Start: 2023-09-19

## 2023-09-19 RX ORDER — DIPHENHYDRAMINE HYDROCHLORIDE, ZINC ACETATE 2; .1 G/100G; G/100G
CREAM TOPICAL
COMMUNITY
Start: 2023-09-19

## 2023-09-19 RX ORDER — IPRATROPIUM BROMIDE AND ALBUTEROL SULFATE 2.5; .5 MG/3ML; MG/3ML
3 SOLUTION RESPIRATORY (INHALATION) EVERY 4 HOURS PRN
Qty: 360 ML | Refills: 0 | Status: SHIPPED | OUTPATIENT
Start: 2023-09-19

## 2023-09-19 RX ADMIN — TAMOXIFEN CITRATE 20 MG: 10 TABLET, FILM COATED ORAL at 12:28

## 2023-09-19 RX ADMIN — MEROPENEM 1000 MG: 1 INJECTION, POWDER, FOR SOLUTION INTRAVENOUS at 08:24

## 2023-09-19 RX ADMIN — ENOXAPARIN SODIUM 40 MG: 100 INJECTION SUBCUTANEOUS at 08:21

## 2023-09-19 RX ADMIN — MEROPENEM 1000 MG: 1 INJECTION, POWDER, FOR SOLUTION INTRAVENOUS at 01:39

## 2023-09-19 RX ADMIN — ERTAPENEM SODIUM 1000 MG: 1 INJECTION INTRAMUSCULAR; INTRAVENOUS at 13:48

## 2023-09-19 RX ADMIN — FLUOXETINE 10 MG: 10 CAPSULE ORAL at 12:28

## 2023-09-19 RX ADMIN — SODIUM CHLORIDE 25 ML: 9 INJECTION, SOLUTION INTRAVENOUS at 13:44

## 2023-09-19 RX ADMIN — POTASSIUM CHLORIDE 20 MEQ: 750 TABLET, FILM COATED, EXTENDED RELEASE ORAL at 12:28

## 2023-09-19 RX ADMIN — AMLODIPINE BESYLATE 5 MG: 5 TABLET ORAL at 12:28

## 2023-09-19 ASSESSMENT — PAIN SCALES - PAIN ASSESSMENT IN ADVANCED DEMENTIA (PAINAD)
BREATHING: 0
FACIALEXPRESSION: 0
CONSOLABILITY: 0
BREATHING: 0
BREATHING: 0
NEGVOCALIZATION: 0
FACIALEXPRESSION: 0
BODYLANGUAGE: 0
BODYLANGUAGE: 0
TOTALSCORE: 0
TOTALSCORE: 0
NEGVOCALIZATION: 0
BODYLANGUAGE: 0
BODYLANGUAGE: 0
NEGVOCALIZATION: 0
BODYLANGUAGE: 0
NEGVOCALIZATION: 0
FACIALEXPRESSION: 0
BREATHING: 0
TOTALSCORE: 0
CONSOLABILITY: 0
NEGVOCALIZATION: 0
CONSOLABILITY: 0
FACIALEXPRESSION: 0
NEGVOCALIZATION: 0
FACIALEXPRESSION: 0
BODYLANGUAGE: 0
BREATHING: 0
TOTALSCORE: 0
CONSOLABILITY: 0
CONSOLABILITY: 0
BODYLANGUAGE: 0
BREATHING: 0
NEGVOCALIZATION: 0
FACIALEXPRESSION: 0
FACIALEXPRESSION: 0
TOTALSCORE: 0
TOTALSCORE: 0
CONSOLABILITY: 0
CONSOLABILITY: 0
TOTALSCORE: 0
BREATHING: 0

## 2023-09-19 NOTE — CARE COORDINATION
Discharge Planning:     (CM) rec'd a call from [facility] who advised Nurse, Ivy Bailey is on her way. The Patient was on service prior and the son is wanting to sign Patient back on services. Nurse, Eileen notified of this.        Electronically signed by MAGNOLIA Barr on 9/19/2023 at 3:53 PM

## 2023-09-19 NOTE — CARE COORDINATION
09/19/23 1551   IMM Letter   IMM Letter given to Patient/Family/Significant other/Guardian/POA/by: IMM Letter given to Patient's son/ POA Caitlyn Qiu over the phone. Caitlyn Qiu in agreement with D/C back to [facility] today.    IMM Letter date given: 09/19/23   IMM Letter time given: 1500         Electronically signed by MAGNOLIA Larios on 9/19/2023 at 3:52 PM

## 2023-09-19 NOTE — PROGRESS NOTES
09/14/23 1900   RT Protocol   History Pulmonary Disease 0   Respiratory pattern 2   Breath sounds 0   Cough 0   Bronchodilator Assessment Score 2
09/17/23 1802   RT Protocol   History Pulmonary Disease 1   Respiratory pattern 0   Breath sounds 0   Cough 0   Bronchodilator Assessment Score 1
[facility] Department   Phone: [phone]    Physical Therapy    [x] Initial Evaluation            [] Daily Treatment Note         [x] Discharge Summary      Patient: Bertha Rodriguez   : 1945   MRN: [id number]   Date of Service:  2023  Admitting Diagnosis: Alzheimer's Disease    Current Admission Summary:   [facility]             Chief Complaint   Patient presents with    Altered Mental Status       Pt arrived via [facility]. Sleepy all day. Found unresponsive at nursing home. Per Squad (with no report from nurse at nursing home) she is suppose to go into hospice but no code status paperwork was with pt. HISTORY OF PRESENT ILLNESS: 1 or more Elements      History From: ecf, ems  Limitations to history : Altered Mental Status     Bertha Rodriguez is a 78 y.o. female who presents unresponsive after being found on the ground. Patient lives in assisted living, history of pretty severe dementia. ECF reports that the patient has been sleepy all day. When I spoke with son he states that she has her days and nights mixed up and has been up all night, sleeping all day for the past couple of weeks. No additional information is able to be obtained at this time.     Past Medical History:  has a past medical history of Alzheimer's disease, unspecified ([address]), Arthritis, Aural vertigo, unspecified ear, Cancer ([address]), Cataract, Crohn disease ([address]), Dementia ([address]), Dental bridge present, Dental crowns present, Diabetes mellitus ([address]), Ductal carcinoma in situ of left breast, Encounter for loop recorder at end of battery life, Goiter, Hearing impairment, Herniated lumbar intervertebral disc, History of pancreatitis, Hypercholesteremia, Hypertension, IBS (irritable bowel syndrome), Insomnia, unspecified, Intraductal carcinoma in situ of left breast, LBBB (left bundle branch block), Need for assistance with personal care, Noninfective
4 Eyes Skin Assessment     NAME:  Melissa Palmer  YOB: 1945  MEDICAL RECORD NUMBER:  [id number]    The patient is being assessed for  Admission    I agree that at least one RN has performed a thorough Head to Toe Skin Assessment on the patient. ALL assessment sites listed below have been assessed. Areas assessed by both nurses:    Head, Face, Ears, Shoulders, Back, Chest, Arms, Elbows, Hands, Sacrum. Buttock, Coccyx, Ischium, Legs. Feet and Heels, and Under Medical Devices         Does the Patient have a Wound? Yes wound(s) were present on assessment.  LDA wound assessment was Initiated and completed by RN       Pedro Prevention initiated by RN: Yes  Wound Care Orders initiated by RN: Yes    Pressure Injury (Stage 3,4, Unstageable, DTI, NWPT, and Complex wounds) if present, place Wound referral order by RN under : Yes    New Ostomies, if present place, Ostomy referral order under : No     Nurse 1 eSignature: Electronically signed by Matt Jimenes RN on 9/15/23 at 2:45 AM EDT    **SHARE this note so that the co-signing nurse can place an eSignature**    Nurse 2 eSignature: Electronically signed by Arline Gonzalez on 9/15/23 at 3:09 AM EDT
[facility]                                            Advanced Care Planning Note. Purpose of Encounter: Advanced care planning in light of advanced dementia  Parties In Attendance: Patient,  Patient's son celso Dupree   Decisional Capacity: No , son is POA  Subjective: Patient/family understand that this conversation is to address long term care goal  Objective:   CPR-Yes  Intubation - Yes  Mechanical  Vent-Yes  Defibrillation-Yes   Gastrostomy - Yes   Hemodialysis -Yes   Goals of Care Determination: Patient/POA   Code Status: Full Code   Time spent on Advanced care Plannin minutes   Advanced Care Planning Documents: Completed advanced directives on chart, Judy Estevez the son  is the POA.     Arsenio Garcia MD  2023 12:33 PM
Ann-Marie Arenas, son, no answer. Unable to get a hold of palliative care at this time.
Attempted to obtain consent for PICC placement. No answer from either contacts listed. Voicemail left. Will attempt again later.
Data- discharge order received, pt verbalized agreement to discharge, disposition to previous residence. Action- discharge instructions prepared and given to EMS transportation, pt verbalized understanding. Medication information packet given r/t NEW and/or CHANGED prescriptions emphasizing name/purpose/side effects, pt verbalized understanding. Discharge instruction summary: Diet- soft and bite sized with meds crushed in puree, Activity- as tolerated,   1. WEIGHT: Admit Weight - Scale: 185 lb (83.9 kg) (09/14/23 1501)        Today  Weight - Scale: 118 lb 11.2 oz (53.8 kg) (09/19/23 0716)       2. O2 SAT.: SpO2: 95 % (09/18/23 4195)    Response- Pt belongings gathered, IV removed. [facility], transported with  all belongings and paperwork, transported via ems transportation, no complications.
Department of Internal Medicine  General Internal Medicine   Progress Note      SUBJECTIVE: somnolent , no respiratory distress    History obtained from chart review, the patient, and nursing staff   General ROS: positive for  - fatigue, malaise, sleep disturbance, and weight gain  negative for - chills, fever, or night sweats  Psychological ROS: positive for - anxiety, behavioral disorder, disorientation, hostility, irritability, and memory difficulties  negative for - hallucinations  Ophthalmic ROS: negative  Respiratory ROS: no cough, shortness of breath, or wheezing  Cardiovascular ROS: positive for - dyspnea on exertion  negative for - chest pain  Gastrointestinal ROS: no abdominal pain, change in bowel habits, or black or bloody stools  Genito-Urinary ROS: no dysuria, trouble voiding, or hematuria  Musculoskeletal ROS: chronic pain   Neurological ROS: positive for - behavioral changes and confusion  Dermatological ROS: negative    OBJECTIVE      Medications      Current Facility-Administered Medications: diphenhydrAMINE-zinc acetate 2-0.1 % cream, , Topical, TID PRN  amLODIPine (NORVASC) tablet 5 mg, 5 mg, Oral, Daily  enoxaparin (LOVENOX) injection 40 mg, 40 mg, SubCUTAneous, Daily  hydrOXYzine HCl (ATARAX) tablet 10 mg, 10 mg, Oral, TID PRN  morphine (PF) injection 2 mg, 2 mg, IntraVENous, Q4H PRN  0.9 % sodium chloride infusion, , IntraVENous, Continuous  acetaminophen (TYLENOL) tablet 500 mg, 500 mg, Oral, Q6H PRN  donepezil (ARICEPT) tablet 5 mg, 5 mg, Oral, Nightly  FLUoxetine (PROZAC) capsule 10 mg, 10 mg, Oral, Daily  insulin glargine (LANTUS) injection vial 25 Units, 25 Units, SubCUTAneous, Nightly  insulin lispro (HUMALOG) injection vial 5 Units, 5 Units, SubCUTAneous, TID WC  potassium chloride (KLOR-CON) extended release tablet 20 mEq, 20 mEq, Oral, BID  risperiDONE (RisperDAL) tablet 0.5 mg, 0.5 mg, Oral, BID  tamoxifen (NOLVADEX) tablet 20 mg, 20 mg, Oral, Daily  meropenem (MERREM) 1,000 mg in
Department of Internal Medicine  General Internal Medicine   Progress Note      SUBJECTIVE: still has profound disorientation and confusion , almost aphasic  intermittently febrile     History obtained from chart review, the patient, and nursing staff   General ROS: positive for  - fatigue, malaise, sleep disturbance, and weight gain  negative for - chills, fever, or night sweats  Psychological ROS: positive for - anxiety, behavioral disorder, disorientation, hostility, irritability, and memory difficulties  negative for - hallucinations  Ophthalmic ROS: negative  Respiratory ROS: no cough, shortness of breath, or wheezing  Cardiovascular ROS: positive for - dyspnea on exertion  negative for - chest pain  Gastrointestinal ROS: no abdominal pain, change in bowel habits, or black or bloody stools  Genito-Urinary ROS: no dysuria, trouble voiding, or hematuria  Musculoskeletal ROS: chronic pain   Neurological ROS: positive for - behavioral changes and confusion  Dermatological ROS: negative    OBJECTIVE      Medications      Current Facility-Administered Medications: diphenhydrAMINE-zinc acetate 2-0.1 % cream, , Topical, TID PRN  amLODIPine (NORVASC) tablet 5 mg, 5 mg, Oral, Daily  enoxaparin (LOVENOX) injection 40 mg, 40 mg, SubCUTAneous, Daily  hydrOXYzine HCl (ATARAX) tablet 10 mg, 10 mg, Oral, TID PRN  morphine (PF) injection 2 mg, 2 mg, IntraVENous, Q4H PRN  0.9 % sodium chloride infusion, , IntraVENous, Continuous  acetaminophen (TYLENOL) tablet 500 mg, 500 mg, Oral, Q6H PRN  donepezil (ARICEPT) tablet 5 mg, 5 mg, Oral, Nightly  FLUoxetine (PROZAC) capsule 10 mg, 10 mg, Oral, Daily  insulin glargine (LANTUS) injection vial 25 Units, 25 Units, SubCUTAneous, Nightly  insulin lispro (HUMALOG) injection vial 5 Units, 5 Units, SubCUTAneous, TID WC  potassium chloride (KLOR-CON) extended release tablet 20 mEq, 20 mEq, Oral, BID  risperiDONE (RisperDAL) tablet 0.5 mg, 0.5 mg, Oral, BID  tamoxifen (NOLVADEX) tablet 20 mg,
Facility/Department: [address]  Speech Language Pathology   Dysphagia Treatment Note    Patient: Tamiko Cummins   : 1945   MRN: [id number]      Evaluation Date: 2023      Admitting Dx: Acute respiratory failure ([address]) [J96.00]  Altered mental status, unspecified altered mental status type [R41.82]  Urinary tract infection in female [N39.0]  Treatment Diagnosis: Oropharyngeal Dysphagia   Pain: Unable to state                                              Diet and Treatment Recommendations 2023:  Diet Solids Recommendation:  Dysphagia III Soft and bite sized  Liquid Consistency Recommendation: Thin liquids   **Straws ok, single sips ONLY Recommended form of Meds: Meds crushed as able in puree           Compensatory strategies:   Upright as possible with all PO intake , Small bites/sips , Eat/feed slowly, Remain upright 30-45 min , Total Feed     Assessment of Texture Tolerance:  Diet level prior to treatment: NPO , NPO  Tolerance of Current Diet Level:N/A    Impressions: Pt was positioned Upright in bed , required ongoing cueing to maintain alertness . Currently on room air. Trials of ice chips , thin liquids, puree , and mixed consistency  were provided to assess swallow function. RN present in room for AM med pass. Pt is asleep upon arrival, requires max verbal and tactile cues to rouse and going cues to maintain alertness. Cues to open eyes not effective however it seemingly alert as will respond to cold stimulation (ice chip on lips) by grimacing and using blanket to wipe lips. Pt unable to participate in OME however suspect WFL based on observation of trials. RN presents meds crushed in applesauce, one large bolus, pt receptive to tactile cue of spoon against lips and demonstrates adequate oral acceptance and entire bolus strippage. Pt masticates bolus and demonstrates adequate oral clearance with multiple swallows.  SLP presents thin liquid via straw, consecutive sips, pt
Hospital Problems             Last Modified POA    * (Principal) Acute respiratory failure ([address]) 9/14/2023 Yes    Alzheimer's disease, unspecified ([facility]) 9/14/2023 Yes    Somnolence 9/14/2023 Yes    UTI (urinary tract infection) 9/14/2023 Yes    Pneumonia 9/14/2023 Yes     H&P dictated
Pharmacy Home Medication Reconciliation Note    A medication reconciliation has been completed for Mita Deshpande 1945    Pharmacy: [facility], [address], [place] Amaral  Information provided by: facility    The patient's home medication list is as follows: No current facility-administered medications on file prior to encounter. Current Outpatient Medications on File Prior to Encounter   Medication Sig Dispense Refill    gabapentin (NEURONTIN) 100 MG capsule Take 2 capsules by mouth 2 times daily. insulin lispro, 1 Unit Dial, (HUMALOG KWIKPEN) 100 UNIT/ML SOPN Inject 0-8 Units into the skin 4 times daily Inject per sliding scale before meals and bedtime. camphor-menthol (SARNA) 0.5-0.5 % lotion Apply topically 2 times daily as needed for Itching Apply topically twice daily as needed to abrasions. acetaminophen (TYLENOL 8 HOUR ARTHRITIS PAIN) 650 MG extended release tablet Take 1 tablet by mouth 2 times daily      miconazole (MICOTIN) 2 % powder Apply topically 2 times daily.  (Patient not taking: Reported on 9/14/2023) 45 g 1    potassium chloride (KLOR-CON M) 20 MEQ TBCR extended release tablet Take 1 tablet by mouth 2 times daily for 10 days 20 tablet 0    tamoxifen (NOLVADEX) 20 MG tablet Take 1 tablet by mouth daily Once daily      Continuous Blood Gluc Sensor (FREESTYLE HENRRY 14 DAY SENSOR) MISC USE AS DIRECTED TO CHECK GLUCOSE 12 each 3    amLODIPine (NORVASC) 5 MG tablet Take 2 tablets by mouth daily 180 tablet 3    risperiDONE (RISPERDAL) 0.5 MG tablet Take 1 tablet by mouth 2 times daily 180 tablet 3    Insulin Pen Needle 32G X 6 MM MISC Use with basal insulin and sliding scale insulin for SQ injections 400 each 5    insulin lispro (HUMALOG) 100 UNIT/ML SOLN injection vial Inject 5 Units into the skin 3 times daily (with meals) (Patient taking differently: Inject 5 Units into the skin 3 times daily (with meals) Inject 5 units three times daily before
Physician Progress Note      Jerad Ingram  CSN #:                  [id number]  :                       1945  ADMIT DATE:       2023 2:31 PM  [address] [facility] DATE:  RESPONDING  PROVIDER #:        Tracy Black MD          QUERY TEXT:    Pt admitted with UTI and PNA. Pt noted to have altered mental status. If   possible, please document in the progress notes and discharge summary if you   are evaluating and / or treating any of the following: The medical record reflects the following:  Risk Factors: 77yo with dementia, UTI and PNA    Clinical Indicators:  ED, \"Unable to perform ROS: Patient unresponsive\", \"GCS: GCS eye subscore is   1. GCS verbal subscore is 1. GCS motor subscore is 4\"  HP ASSESSMENT, Altered mental status, pneumonia, urinary tract infection,   excessive somnolence  SLP 9/15, \" Pt is currently not alert enough to participate in evaluation. \"  IM SUBJECTIVE , \"lot more alert awake without distress on room air \"    Treatment: ABX, CT head, supportive care  Options provided:  -- Metabolic encephalopathy  -- Other - I will add my own diagnosis  -- Disagree - Not applicable / Not valid  -- Disagree - Clinically unable to determine / Unknown  -- Refer to Clinical Documentation Reviewer    PROVIDER RESPONSE TEXT:    Toxic encephalopathy    Query created by: Candie Alfredo on 2023 9:51 AM      Electronically signed by:   Tracy Black MD 2023 11:36 AM
Physician Progress Note      Mary Singh  [facility] #:                  [id number]  :                       1945  ADMIT DATE:       2023 2:31 PM  [address] [facility] DATE:  RESPONDING  PROVIDER #:        Charline Todd MD          QUERY TEXT:    Patient admitted with UTI. Noted documentation of acute respiratory failure in   PN on 2023. In order to support the diagnosis of acute respiratory   failure, please include additional clinical indicators in your documentation. Or please document if the diagnosis of acute respiratory failure has been   ruled out after further study. The medical record reflects the following:  Risk Factors: Pneumonia, COPD    Clinical Indicators:    ED-  Pulmonary:  Effort: Pulmonary effort is normal. No respiratory distress. Breath sounds: Normal breath sounds. H&P-  LUNGS:  Vesicular breath sounds. Prolonged exhalation. Few expiratory   rhonchi. RA %, RR 12-22    Treatment: monitor O2    Acute Respiratory Failure Clinical Indicators per 3[facility] MS-DRG Training Guide and   Quick Reference Guide:  pO2 < 60 mmHg or SpO2 (pulse oximetry) < 91% breathing room air  pCO2 > 50 and pH < 7.35  P/F ratio (pO2 / FIO2) < 300  pO2 decrease or pCO2 increase by 10 mmHg from baseline (if known)  Supplemental oxygen of 40% or more  Presence of respiratory distress, tachypnea, dyspnea, shortness of breath,   wheezing  Unable to speak in complete sentences  Use of accessory muscles to breathe  Extreme anxiety and feeling of impending doom  Tripod position  Confusion/altered mental status/obtunded    Thank you,  Christianne Barton RN, BSN, CRCR, CCDS, SMART  Clinical Documentation Improvement  William Black. [email or web address]  [phone] or via Perfect Serve  Options provided:  -- Acute Respiratory Failure as evidenced by, Please document evidence.   -- Acute Respiratory Failure ruled out after study  -- Other - I will add my own diagnosis  -- Disagree - Not applicable /
Pt is constantly itching all over and is restless/anxious.  Verbal order for Atarax from [facility], order placed
Pt received to [phone] from ED. Alert, speech is clear but illogical. Disoriented x4, anxious, restless. Has pulled out 2 IV's since arrival to unit despite use of camera, and continues to pull at monitors/perez/IV, pulling gown off and attempting to get out of bed. Sitter placed at bedside at this time per MD order. VSS. Call placed to [address] [place] [address] to complete admission history/questionnaire. Per report, patient had a recent fall, is confused and restless and wheelchair bound at baseline. Nursing staff state that pt was previously under hospice care. Pt has large abscess to R hip s/p I&D with dressing intact. Additional abscess noted to L buttock- dressing applied. Wound care RN consulted. Pt frequently scratching at back, arms, buttocks, agustin area and legs, and has multiple open scratches throughout those areas. MD notified and benadryl-zinc cream ordered. Meds were given crushed in puree and tolerated well. Otherwise kept NPO pending SLP eval. Perez catheter intact. Bilat SCD's applied. Strict isolation maintained. Will cont to monitor.
Speech Language Pathology  Attempt    Jesi Figueroa  1945      SLP Eval and Treat orders were received. Attempted to see patient for clinical swallow evaluation. Per RN report, Pt is currently not alert enough to participate in evaluation. Will hold and follow up as schedule allows. 1200- Second attempt to see patient for bedside swallow evaluation. RN reported Pt continues with reduced alertness. Will hold and follow up as schedule allows.     Molly Cohen M.A., [facility]  Speech-Language Pathologist
Speech Language Pathology  Attempt    Tao Sharma  1945    Attempted to see pt for dysphagia therapy this date. RN cleared SLP for entry. Upon SLP arrival sitter at bedside reported that pt had been given meds that might cause drowsiness. Pt seen sleeping soundly, unable to awaken with multiple max  tactile and sensory prompts. Therefore, PO trials not attempted. Will attempt to follow-up as schedule allows.      Thank you,     Chano Holland M.A., [facility] [place], [place]  Speech-Language Pathologist
Wound care completed per instructions from wound care nurse.
advanced dementia stage. Based on today's assessment recommend Dysphagia III Soft and bite sized  with Thin liquids , Meds crushed as able in puree , assist feed. Dysphagia Goals:   Pt will functionally tolerate recommended diet with no overt clinical s/s of aspiration (Ongoing 09/19/23)  Pt will advance to least restrictive diet as indicated (Goal Met 9/18/23)  If clinical s/s of aspiration/penetration continue to be noted, Pt will participate in Modified Barium Swallow Study (Ongoing 09/19/23)    Plan:   1-2 times to ensure diet tolerance. Patient/Family Education:  Provided education regarding role of SLP, recommendations and general speech pathology plan of care. [] Pt verbalized understanding and agreement   [x] Pt requires ongoing learning   [x] No evidence of comprehension     Discharge Recommendations:    Recommend ongoing SLP for dysphagia therapy upon discharge from hospital     Treatment time:  Timed Code Treatment Minutes: 0  Total Treatment time: 15    If patient discharges prior to next session this note will serve as a discharge summary.      Signature:   Wilfredo Olmedo [address]  Speech Language Pathologist
advanced dementia stage. Based on today's assessment recommend Dysphagia III Soft and bite sized  with Thin liquids , Meds crushed as able in puree . Dysphagia Goals:   Pt will functionally tolerate recommended diet with no overt clinical s/s of aspiration (Ongoing 09/18/23)  Pt will advance to least restrictive diet as indicated (Goal Met 9/18/23)  If clinical s/s of aspiration/penetration continue to be noted, Pt will participate in Modified Barium Swallow Study (Ongoing 09/18/23)    Plan:   1-2 times to ensure diet tolerance. Patient/Family Education:  Provided education regarding role of SLP, recommendations and general speech pathology plan of care. [] Pt verbalized understanding and agreement   [x] Pt requires ongoing learning   [x] No evidence of comprehension     Discharge Recommendations:    Recommend ongoing SLP for dysphagia therapy upon discharge from hospital     Treatment time:  Timed Code Treatment Minutes: 0  Total Treatment time: 15    If patient discharges prior to next session this note will serve as a discharge summary.      Signature:   James Dailey M.A., [facility] [place]ech-[place] Pathologist    Wallace Khoury., [address] [facility].[id number]  Speech-Language Pathologist
(Complexity): medium complexity  Clinical Presentation: stable      Subjective  General: Pt supine in bed upon entry, lethargic. Difficult to arouse. Pt perseverating on itching back and attempting to pull on catheter  Pain: Patient unable to rate secondary to cognition/communication deficits  Pain Interventions: not applicable, repositioned , and therapy activities modified     Activities of Daily Living  Basic Activities of Daily Living  Grooming: maximum assistance dependent  Grooming Comments: assist to thoroughly wash and comb hair, pt however demo ability to wash face at set up, assist for thoroughness  Upper Extremity Bathing: dependent  Lower Extremity Bathing: dependent   Upper Extremity Dressing: dependent  Dressing Comments: gown change  Toileting: dependent. Toileting Comments: perez catheter present  Instrumental Activities of Daily Living  No IADL completed on this date. Functional Mobility  Bed Mobility:  Supine to Sit: 2 person assistance with dependent of 2   Sit to Supine: 2 person assistance with dependent of 2   Rolling Left: dependent assistance  Scootin person assistance with dependent of 2   Comments: pt with no initiation  Transfers:  Sit to stand transfer:stedy utilized requiring dependent x2 attempted sit <> stand    Comments: minimal clearance from EOB level, pt unable to follow commands  Functional Mobility  No functional mobility completed on this date secondary to no command following, decreased cognition.   Balance:  Static Sitting Balance: fair (-): maintains balance at periods of SBA to max A with use of UE support  Dynamic Sitting Balance: fair (-): maintains balance at periods of SBA to max A with use of UE support  Comments: intermittent posterior lean, pt unable to achieve midline positioning despite max verbal and physical cues     Other Therapeutic Interventions    Functional Outcomes  AM-PAC Inpatient Daily Activity Raw Score: 6    Cognition  Overall Cognitive Status:
Intervention 9/16/2023:  Diet Solids Recommendation:  NPO  Liquid Consistency Recommendation:  NPO Recommended form of Meds: Meds crushed as able in puree  or Meds via alternative means    **Ice chips ok when fully alert**    Recommend completion of Modified Barium Swallow study to further assess pharyngeal phase of swallow OR FEES     Compensatory Swallowing Strategies:  Upright as possible with all PO intake , Remain upright 30-45 min , Total Feed     SHORT TERM DYSPHAGIA GOALS/PLAN OF CARE: Speech therapy for dysphagia tx 3-5 times per week during acute care stay.     Pt will functionally tolerate ongoing assessment of swallow function with diet to be determined as indicated   Pt will advance to least restrictive diet as indicated   Pt will participate in Modified Barium swallow study to further assess pharyngeal phase of swallow, assist with diet recommendations and to further direct plan of care OR FEES     Dysphagia Therapeutic Intervention:  Diet Tolerance Monitoring , Patient/Family Education , Therapeutic Trials with SLP , Instrumental assessment of swallow function (Modified Barium Swallow Study) , Instrumental assessment of swallow function (SLP FEES EVALUATION)     Discharge Recommendations: Discharge recommendations to be determined pending ongoing follow-up during acute care stay    Patient Positioning: Upright in bed     Current Diet Level (prior to evaluation): Regular texture diet  Thin liquids      Respiratory Status:   [x]Room Air   []O2 via nasal cannula   []Other:    Dentition:  []Adequate  []Dentures   []Missing Many Teeth  []Edentulous  [x]Other: Unable to assess    Baseline Vocal Quality:  []Normal  []Dysphonic   []Aphonic   []Hoarse  []Wet  []Weak  [x]Other: Unable to assess    Volitional Cough:  Not Elicited     Volitional Swallow:   []Absent   []Delayed     []Adequate     [x]Required use of drink     Oral Mechanism Exam:  []WFL []Mild   [] Moderate  []Severe  [x]To be
Result Value Ref Range    POC Glucose 83 70 - 99 mg/dl    Performed on ACCU-CHEK    POCT Glucose    Collection Time: 09/17/23  7:06 AM   Result Value Ref Range    POC Glucose 86 70 - 99 mg/dl    Performed on ACCU-CHEK    POCT Glucose    Collection Time: 09/17/23 10:54 AM   Result Value Ref Range    POC Glucose 98 70 - 99 mg/dl    Performed on ACCU-CHEK    POCT Glucose    Collection Time: 09/17/23  3:36 PM   Result Value Ref Range    POC Glucose 153 (H) 70 - 99 mg/dl    Performed on ACCU-CHEK    POCT Glucose    Collection Time: 09/17/23  7:41 PM   Result Value Ref Range    POC Glucose 179 (H) 70 - 99 mg/dl    Performed on ACCU-CHEK    POCT Glucose    Collection Time: 09/18/23  1:43 AM   Result Value Ref Range    POC Glucose 135 (H) 70 - 99 mg/dl    Performed on ACCU-CHEK    CBC    Collection Time: 09/18/23  5:01 AM   Result Value Ref Range    WBC 6.2 4.0 - 11.0 K/uL    RBC 3.48 (L) 4.00 - 5.20 M/uL    Hemoglobin 10.9 (L) 12.0 - 16.0 g/dL    Hematocrit 32.7 (L) 36.0 - 48.0 %    MCV 93.8 80.0 - 100.0 fL    MCH 31.4 26.0 - 34.0 pg    MCHC 33.5 31.0 - 36.0 g/dL    RDW 13.8 12.4 - 15.4 %    Platelets 481 846 - 553 K/uL    MPV 8.2 5.0 - 10.5 fL   Basic Metabolic Panel    Collection Time: 09/18/23  5:01 AM   Result Value Ref Range    Sodium 134 (L) 136 - 145 mmol/L    Potassium 3.8 3.5 - 5.1 mmol/L    Chloride 104 99 - 110 mmol/L    CO2 21 21 - 32 mmol/L    Anion Gap 9 3 - 16    Glucose 111 (H) 70 - 99 mg/dL    BUN 16 7 - 20 mg/dL    Creatinine 0.8 0.6 - 1.2 mg/dL    Est, Glom Filt Rate >60 >60    Calcium 8.1 (L) 8.3 - 10.6 mg/dL   POCT Glucose    Collection Time: 09/18/23  7:31 AM   Result Value Ref Range    POC Glucose 89 70 - 99 mg/dl    Performed on ACCU-CHEK    POCT Glucose    Collection Time: 09/18/23 11:58 AM   Result Value Ref Range    POC Glucose 257 (H) 70 - 99 mg/dl    Performed on ACCU-CHEK    POCT Glucose    Collection Time: 09/18/23  4:04 PM   Result Value Ref Range    POC Glucose 174 (H) 70 - 99 mg/dl
Glucose 83 70 - 99 mg/dl    Performed on ACCU-CHEK    POCT Glucose    Collection Time: 09/17/23  7:06 AM   Result Value Ref Range    POC Glucose 86 70 - 99 mg/dl    Performed on ACCU-CHEK    POCT Glucose    Collection Time: 09/17/23 10:54 AM   Result Value Ref Range    POC Glucose 98 70 - 99 mg/dl    Performed on [address] Problems             Last Modified POA    Alzheimer's disease, unspecified ([address]) 9/15/2023 Yes    Diabetes mellitus ([address]) 9/15/2023 Yes    Somnolence 9/14/2023 Yes    UTI (urinary tract infection) 9/14/2023 Yes    Pneumonia 9/14/2023 Yes        Cultures are ESBL E coli , ct Carbepenem  antibiotic which was started on admission to begin with in anticipation   Maintain hydration

## 2023-09-19 NOTE — DISCHARGE INSTR - COC
Continuity of Care Form    Patient Name: Josefina Moreno   :  1945  MRN:  [id number]    Admit date:  2023  Discharge date:  2023    Code Status Order: Full Code   Advance Directives:     Admitting Physician:  Jackeline Mtz MD  PCP: Izaiah Logan, APRN - CNP    Discharging Nurse: Hima Phillip Unit/Room#: 8EK-6800/2132-83  Discharging Unit Phone Number: [phone]    Emergency Contact:   Extended Emergency Contact Information  Primary Emergency Contact: Toby Borrero  Address:             [address]  [address] [facility] [address] [facility] Phone: [phone]  Mobile Phone: [phone]  Relation: Child  Secondary Emergency Contact:  [facility]  Mobile Phone: [phone]  Relation: Brother/Sister    Past Surgical History:  Past Surgical History:   Procedure Laterality Date    AXILLARY SURGERY Left     BREAST BIOPSY  10/5/12    BREAST LUMPECTOMY  12    with axillary nodedissection    CARDIAC SURGERY      coronary angiogram-no blockage    CARPAL TUNNEL RELEASE      bilateral    COLONOSCOPY      EYE SURGERY Bilateral     Cataracts    HYSTERECTOMY (CERVIX STATUS UNKNOWN)      INSERTABLE CARDIAC MONITOR      LUMBAR SPINE SURGERY  2016    BILATERAL DECOMPRESSIVE LUMBAR LAMINECTOMY L4-L5, RIGHT       Immunization History:   Immunization History   Administered Date(s) Administered    COVID-19, PFIZER GRAY top, DO NOT Dilute, (age 15 y+), IM, 30 mcg/0.3 mL 2022    COVID-19, PFIZER PURPLE top, DILUTE for use, (age 15 y+), 30mcg/0.3mL 2021, 2021, 10/05/2021    Influenza A (R4y1-19),all Formulations 2010    Influenza Virus Vaccine 10/04/2012, 10/17/2013, 10/15/2015, 2020    Influenza Whole 10/14/2002, 10/22/2004, 10/24/2005, 2006, 10/19/2007, 10/28/2008, 2009, 10/13/2011    Influenza, FLUZONE (age 72 y+), High Dose, 0.7mL 2021, 2022    Influenza, High Dose (Fluzone 65 yrs and older) 10/15/2010, 10/14/2014,

## 2023-09-19 NOTE — CARE COORDINATION
Case Management -  Discharge Note      Patient Name: Bria Garcia                   YOB: 1945            Readmission Risk (Low < 19, Mod (19-27), High > 27): Readmission Risk Score: 16    Current PCP: Roise Skiff, APRN - CNP    ([facility]) Important Message from Medicare:    Date: Completed with POA- Parmjit Ingram over the phone. In agreement with D/C. PT AM-PAC: 6 /24  OT AM-PAC: 6 /24    Patient noted to have a discharge order. Pt has been medically cleared. Patient discharged to   [facility] of [address] [place], [address]  Phone: [phone]  Fax: [phone]    Room 3304A Bed 1  CM called [facility] and updated them with the room number. Transportation scheduled for [employer] provided by [employer]  ([phone])    AVS faxed and agency notified:  Yes and spoke with Mary Price in admissions. The following prescriptions sent with pt: Diphenhydramine-zinc acetate, Ertapenem IM, Ipratropium. Family Notified:  Yes spoke with son/EBONIE Ingram    Nurse to call report to facility [phone]. Nurse- Gavi Colon called in report. Nurse at [facility] asked for the Ertapenim IM order to be faxed to them. Gavi Colon called Dr. Bess Moore and he advised he would work on it and get it faxed over to them. Patient recommended for SNF however son prefers to keep Patient in her memory care unit where she is comfortable.       Financial    Payor: Larisa Kimball / Plan: Mauricio Cardenas PPO / Product Type: Medicare /     Pharmacy:  Potential assistance Purchasing Medications: No  Meds-to-Beds request: Yes      [address] [facility], [address] [place] [address],5Th Floor [phone] - f [phone]  [address] [place] [address],[address]  [address] [place] [address]  Phone: [phone] Fax: [phone] [facility] [address] [facility], [address] [place] [address] Drive [address] [phone] [facility] [phone] [address]

## 2023-09-19 NOTE — CARE COORDINATION
Discharge Planning:     (DEBI) spoke with Parveen Zuñiga from [facility] who advised Patient can not come back to her [address] with a PICC line. DEBI sent Dr. Kaylie Messina a message via perfect serve notifying him of this and to see if we could do something else. CM spoke with Son, Ajay Walter and provided and update. CM advised she would call him later in the day to establish D/C plan with him after clarification with Dr. Kaylie Messina. Ajay Walter reported understanding. CM also notified Patient's Nurse as well.        Electronically signed by MAGNOLIA Reno on 9/19/2023 at 1:10 PM

## 2023-10-14 PROBLEM — N39.0 UTI (URINARY TRACT INFECTION): Status: RESOLVED | Noted: 2023-09-14 | Resolved: 2023-10-14

## 2023-11-16 NOTE — PROGRESS NOTES
Patient restless, agitated and uncooperative all night with nursing staff. Pulling off tele, pulling off purewick and unable to comprehend instructions to get urine sample. Attending provider notified and 1x order ativan given. Patient now calm but still uncooperative when trying to provide care. Bed alarmed, locked and lowered. Call bell within reach. Sitter in room. Results conveyed.

## 2024-02-09 ENCOUNTER — TELEPHONE (OUTPATIENT)
Dept: FAMILY MEDICINE CLINIC | Age: 79
End: 2024-02-09

## 2024-03-14 ENCOUNTER — TELEPHONE (OUTPATIENT)
Dept: FAMILY MEDICINE CLINIC | Age: 79
End: 2024-03-14

## 2024-03-14 NOTE — TELEPHONE ENCOUNTER
[facility] sent a request for Continuance of Therapy Prescription that needs to be completed by Patricia.    (scanned paperwork and attached to phone encounter)

## 2024-03-18 NOTE — TELEPHONE ENCOUNTER
Please confirm with son that patient is staying under my care.  I thought with her transitioning to a new nursing home for memory care the provider there would be managing her.

## 2024-10-17 RX ORDER — INSULIN GLARGINE 100 [IU]/ML
INJECTION, SOLUTION SUBCUTANEOUS
Qty: 15 ML | Refills: 2 | OUTPATIENT
Start: 2024-10-17

## 2024-10-17 NOTE — TELEPHONE ENCOUNTER
Medication:   Requested Prescriptions     Pending Prescriptions Disp Refills    LANTUS SOLOSTAR 100 UNIT/ML injection pen [Pharmacy Med Name: Lantus SoloStar 100 UNIT/ML Solution pen-injector] 15 mL 2     Sig: INJECT 5 UNITS SUB-Q AT BEDTIME REFRIGERATE UNTIL OPENED. DISCARD 28 DAYS AFTER OPENING.       Last Filled:  05/31/23     Patient Phone Number: [phone] (home)     Last appt: 5/23/2023   Next appt: Visit date not found    Last Labs DM:   Lab Results   Component Value Date/Time    LABA1C 8.9 06/13/2023 05:25 AM

## 2024-10-31 RX ORDER — INSULIN GLARGINE 100 [IU]/ML
INJECTION, SOLUTION SUBCUTANEOUS
Qty: 15 ML | Refills: 11 | OUTPATIENT
Start: 2024-10-31

## 2025-06-24 NOTE — DISCHARGE INSTR - COC
Patient Quality Outreach    Patient is due for the following:   Physical Annual Wellness Visit      Topic Date Due    Zoster (Shingles) Vaccine (2 of 2) 08/24/2021    Diptheria Tetanus Pertussis (DTAP/TDAP/TD) Vaccine (2 - Td or Tdap) 04/02/2022    Pneumococcal Vaccine (2 of 2 - PCV) 07/08/2022    COVID-19 Vaccine (4 - 2024-25 season) 09/01/2024       Action(s) Taken:   Schedule a Annual Wellness Visit    Type of outreach:    Sent letter.    Questions for provider review:    None         Renata Garcia CMA  Chart routed to None.       
09/28/2017, 09/12/2018, 09/25/2019    Influenza, Triv, 3 Years and older, IM, PF (Afluria 5yrs and older) 09/21/2020    Pneumococcal, PCV-13, PREVNAR 15, (age 6w+), IM, 0.5mL 03/03/2015    Pneumococcal, PPSV23, PNEUMOVAX 23, (age 2y+), SC/IM, 0.5mL 10/31/2006, 08/08/2016    TD 5LF, Edward Core, (age 7y+), IM, 0.5mL 10/24/2022    TDaP, ADACEL (age 10y-63y), [address] (age 10y+), IM, 0.5mL 07/05/2012    Zoster Live (Zostavax) 11/21/2013    Zoster Recombinant (Shingrix) 09/12/2018, 01/03/2019       Active Problems:  Patient Active Problem List   Diagnosis Code    Diabetes mellitus ([facility] E11.9    Personal history of breast cancer Z85.3    Non-specific colitis K52.9    Left bundle branch block I44.7    Meniere's disease H81.09    Pure hypercholesterolemia E78.00    Essential hypertension I10    Lumbar stenosis M48.061    S/P lumbar laminectomy Z98.890    S/P discectomy for herniated nucleus pulposus Z98.890, Z87.39    Goiter E04.9    Alzheimer's dementia ([facility] G30.9, F02.80    Bilateral hearing loss H91.93    Uncontrolled type 2 diabetes mellitus with hyperglycemia ([facility] E11.65    Depression F32. A    Syncope R55    Syncope and collapse R55    TIA (transient ischemic attack) G45.9    Acquired hallux malleus M20.30    Dehydration E86.0       Isolation/Infection:   Isolation            Contact          Patient Infection Status       Infection Onset Added Last Indicated Last Indicated By Review Planned Expiration Resolved Resolved By    ESBL (Extended Spectrum Beta Lactamase) 04/11/23 04/13/23 04/11/23 Culture, Urine                Nurse Assessment:  Last Vital Signs: BP (!) 164/75   Pulse 66   Temp 97.9 °F (36.6 °C) (Oral)   Resp 18   Ht 5' 4\" (1.626 m)   Wt 180 lb 12.4 oz (82 kg)   SpO2 96%   BMI 31.03 kg/m²     Last documented pain score (0-10 scale): Pain Level: 7  Last Weight:   Wt Readings from Last 1 Encounters:   06/06/23 180 lb 12.4 oz (82 kg)     Mental Status:  disoriented    IV Access:  - None    Nursing